# Patient Record
Sex: FEMALE | Race: WHITE | NOT HISPANIC OR LATINO | Employment: PART TIME | URBAN - METROPOLITAN AREA
[De-identification: names, ages, dates, MRNs, and addresses within clinical notes are randomized per-mention and may not be internally consistent; named-entity substitution may affect disease eponyms.]

---

## 2017-02-02 ENCOUNTER — OFFICE VISIT (OUTPATIENT)
Dept: FAMILY MEDICINE | Facility: CLINIC | Age: 52
End: 2017-02-02

## 2017-02-02 VITALS
HEART RATE: 67 BPM | TEMPERATURE: 98.4 F | WEIGHT: 161.2 LBS | DIASTOLIC BLOOD PRESSURE: 75 MMHG | BODY MASS INDEX: 27.45 KG/M2 | SYSTOLIC BLOOD PRESSURE: 112 MMHG

## 2017-02-02 DIAGNOSIS — Z00.00 PREVENTATIVE HEALTH CARE: ICD-10-CM

## 2017-02-02 DIAGNOSIS — G89.4 CHRONIC PAIN SYNDROME: Primary | ICD-10-CM

## 2017-02-02 LAB
AMPHETAMINES QUAL: NEGATIVE
BARBITURATES QUAL URINE: NEGATIVE
BENZODIAZEPINE QUAL URINE: NEGATIVE
BUPRENORPHINE QUAL URINE: NEGATIVE
CANNABINOIDS UR QL SCN: NEGATIVE
COCAINE QUAL URINE: NEGATIVE
METHAMPHETAMINE: NEGATIVE
METHODONE QUAL: NEGATIVE
MORPHINE QUAL: POSITIVE
OXYCODONE QUAL: POSITIVE
TEMPERATURE OF URINE WAS BETWEEN 90-100 DEGREES F: YES

## 2017-02-02 RX ORDER — HYDROCODONE BITARTRATE AND ACETAMINOPHEN 5; 325 MG/1; MG/1
TABLET ORAL
Qty: 30 TABLET | Refills: 0 | Status: SHIPPED | OUTPATIENT
Start: 2017-02-02 | End: 2017-09-18

## 2017-02-02 NOTE — PROGRESS NOTES
S: Kendra Chang is a 51 year old female who returns for follow up of   Pain meds  Patients states that main concern today is  Refills.  2. Left breat lump; had mammeñoan an US in 2015     PMHX/PSHX/MEDS/ALLERGIES/SHX/FHX reviewed and updated in Epic.      ROS:  General: No fevers, chills  Head: No headache  Ears: No acute change in hearing.    CV: No chest pain or palpitations.  Resp: No shortness of breath.  No cough. No hemoptysis.  GI: No nausea, vomiting, constipation, diarrhea  : No urinary pains    O: /75 mmHg  Pulse 67  Temp(Src) 98.4  F (36.9  C) (Oral)  Ht   Wt 161 lb 3.2 oz (73.12 kg)   Gen:  Well nourished and in NAD  Hip pain; using low dose vicodin/pain management  Extrem: no cyanosis, edema or clubbing  Psych: Euthymic    Breast exam; normal fibrocyteic no obvious masses palpated no adenopathy  (G89.4) Chronic pain syndrome  (primary encounter diagnosis)    Plan: Rapid Urine Drug Screen ([facility])        2.  Hx ofLeft  breast lump;  Repeat  mammogram   Comment: Hysterectomy  More than ten years ago, some menopause  symtoms    RTC in 4 weeks, for follow up of  Pain meds refills and mammogram results or sooner if develops new or worsening symptoms.    Elfego Tom

## 2017-02-02 NOTE — MR AVS SNAPSHOT
After Visit Summary   2/2/2017    Kendra Chang    MRN: [id number]           Patient Information     Date Of Birth          1965        Visit Information        Provider Department      2/2/2017 9:00 AM Elfego Tom MD [facility]        Today's Diagnoses     Chronic pain syndrome    -  1     Preventative health care           Care Instructions    Stop at the referral desk to schedule an appointment for a mammogram.      Follow up in 1 month.    Thank you for coming to [facility].  **If you had lab testing today and your results are reassuring or normal they will be be mailed to you within 7 days.   **If the lab tests need quick action we will call you with the results.  The phone number we will call with results is # [phone] (home) [phone] (work). If this is not the best number please call our clinic and change the number.  If you need any refills please call your pharmacy and they will contact us.  If you have any concerns about today's visit or wish to schedule another appointment please call our office during normal business hours [phone] (8-5:00 M-F)  If you have urgent medical concerns please call [phone] at any time of the day.  If you a medical emergency please call [phone]  Again thank you for choosing [facility] and please let us know how we can best partner with you to improve you and your family's health.          Follow-ups after your visit        Future tests that were ordered for you today     Open Future Orders        Priority Expected Expires Ordered    PCS Use: Screening Digital Bilateral Mammogram Routine  2/2/2018 2/2/2017            Who to contact     Please call your clinic at [phone] to:    Ask questions about your health    Make or cancel appointments    Discuss your medicines    Learn about your test results    Speak to your doctor   If you have compliments or concerns about an experience at your clinic, or if you wish to file a  complaint, please contact [facility] Patient Relations at [phone] or email us at [email or web address]         Additional Information About Your Visit        Reeliohart Information     Kites gives you secure access to your electronic health record. If you see a primary care provider, you can also send messages to your care team and make appointments. If you have questions, please call your primary care clinic.  If you do not have a primary care provider, please call [phone] and they will assist you.      Kites is an electronic gateway that provides easy, online access to your medical records. With Kites, you can request a clinic appointment, read your test results, renew a prescription or communicate with your care team.     To access your existing account, please contact your [facility] or call [phone] for assistance.        Care EveryWhere ID     This is your Care EveryWhere ID. This could be used by other organizations to access your [place] medical records  [id number]        Your Vitals Were     Pulse Temperature                67 98.4  F (36.9  C) (Oral)           Blood Pressure from Last 3 Encounters:   02/02/17 112/75   12/22/16 113/77   12/01/16 121/77    Weight from Last 3 Encounters:   02/02/17 161 lb 3.2 oz (73.12 kg)   12/22/16 167 lb 9.6 oz (76.023 kg)   12/01/16 171 lb (77.565 kg)              We Performed the Following     Rapid Urine Drug Screen ([facility])          Today's Medication Changes          These changes are accurate as of: 2/2/17  9:32 AM.  If you have any questions, ask your nurse or doctor.               These medicines have changed or have updated prescriptions.        Dose/Directions    * HYDROcodone-acetaminophen 5-325 MG per tablet   Commonly known as:  NORCO   This may have changed:  Another medication with the same name was added. Make sure you understand how and when to take each.   Used for:   Chronic pain syndrome   Changed by:  Elfego Tom MD        One to two per day as needed for  right hip pain   Quantity:  30 tablet   Refills:  0       * HYDROcodone-acetaminophen 5-325 MG per tablet   Commonly known as:  NORCO   This may have changed:  Another medication with the same name was added. Make sure you understand how and when to take each.   Used for:  Chronic pain syndrome   Changed by:  Elfego Tom MD        One to two as needed for pain maximum 2  tablet(s) per day   Quantity:  30 tablet   Refills:  0       * HYDROcodone-acetaminophen 5-325 MG per tablet   Commonly known as:  NORCO   This may have changed:  You were already taking a medication with the same name, and this prescription was added. Make sure you understand how and when to take each.   Used for:  Chronic pain syndrome   Changed by:  Elfego Tom MD        one to two as needed per hip pain maximum 2 tablet(s) per day   Quantity:  30 tablet   Refills:  0       * Notice:  This list has 3 medication(s) that are the same as other medications prescribed for you. Read the directions carefully, and ask your doctor or other care provider to review them with you.         Where to get your medicines      Some of these will need a paper prescription and others can be bought over the counter.  Ask your nurse if you have questions.     Bring a paper prescription for each of these medications    - HYDROcodone-acetaminophen 5-325 MG per tablet             Primary Care Provider Office Phone # Fax #    Elfego Tom [phone]       [address] [address]        Thank you!     Thank you for choosing [facility]  for your care. Our goal is always to provide you with excellent care. Hearing back from our patients is one way we can continue to improve our services. Please take a few minutes to complete the written survey that you may receive in the mail after your visit with us. Thank you!              Your Updated Medication List - Protect others around you: Learn how to safely use, store and throw away your medicines at www.disposemymeds.org.          This list is accurate as of: 2/2/17  9:32 AM.  Always use your most recent med list.                   Brand Name Dispense Instructions for use    * HYDROcodone-acetaminophen 5-325 MG per tablet    NORCO    30 tablet    One to two per day as needed for  right hip pain       * HYDROcodone-acetaminophen 5-325 MG per tablet    NORCO    30 tablet    One to two as needed for pain maximum 2  tablet(s) per day       * HYDROcodone-acetaminophen 5-325 MG per tablet    NORCO    30 tablet    one to two as needed per hip pain maximum 2 tablet(s) per day       levothyroxine 50 MCG tablet    SYNTHROID    90 tablet    Take 1 tablet (50 mcg) by mouth daily       lisinopril 20 MG tablet    PRINIVIL/ZESTRIL    90 tablet    Take 1 tablet (20 mg) by mouth daily       MULTI-VITAMIN PO      Take  by mouth.       OMEPRAZOLE PO          psyllium Wafr      Take 2 Wafers by mouth daily       vitamin D 400 UNITS capsule      Take 1 capsule by mouth daily.       * Notice:  This list has 3 medication(s) that are the same as other medications prescribed for you. Read the directions carefully, and ask your doctor or other care provider to review them with you.

## 2017-02-07 ENCOUNTER — HOSPITAL ENCOUNTER (OUTPATIENT)
Dept: MAMMOGRAPHY | Facility: CLINIC | Age: 52
Discharge: HOME | End: 2017-02-07
Attending: FAMILY MEDICINE

## 2017-02-07 DIAGNOSIS — Z00.00 PREVENTATIVE HEALTH CARE: ICD-10-CM

## 2017-02-07 DIAGNOSIS — Z12.31 VISIT FOR SCREENING MAMMOGRAM: ICD-10-CM

## 2017-02-07 LAB — MAMMOGRAM: NORMAL

## 2017-02-14 DIAGNOSIS — E03.8 OTHER SPECIFIED HYPOTHYROIDISM: ICD-10-CM

## 2017-02-14 RX ORDER — LEVOTHYROXINE SODIUM 50 UG/1
50 TABLET ORAL DAILY
Qty: 90 TABLET | Refills: 1 | Status: SHIPPED | OUTPATIENT
Start: 2017-02-14 | End: 2017-09-06

## 2017-03-06 DIAGNOSIS — I10 HTN (HYPERTENSION): ICD-10-CM

## 2017-03-06 DIAGNOSIS — I10 ESSENTIAL HYPERTENSION: Primary | ICD-10-CM

## 2017-03-07 RX ORDER — LISINOPRIL 20 MG/1
20 TABLET ORAL DAILY
Qty: 90 TABLET | Refills: 3 | Status: SHIPPED | OUTPATIENT
Start: 2017-03-07 | End: 2017-09-18

## 2017-07-18 ENCOUNTER — RECORDS - HEALTHEAST (OUTPATIENT)
Dept: ADMINISTRATIVE | Facility: OTHER | Age: 52
End: 2017-07-18

## 2017-07-18 ENCOUNTER — TRANSFERRED RECORDS (OUTPATIENT)
Dept: HEALTH INFORMATION MANAGEMENT | Facility: CLINIC | Age: 52
End: 2017-07-18

## 2017-08-10 ENCOUNTER — COMMUNICATION - HEALTHEAST (OUTPATIENT)
Dept: TELEHEALTH | Facility: CLINIC | Age: 52
End: 2017-08-10

## 2017-08-10 ENCOUNTER — HOSPITAL ENCOUNTER (OUTPATIENT)
Dept: MRI IMAGING | Facility: CLINIC | Age: 52
Discharge: HOME | End: 2017-08-10
Attending: PSYCHIATRY & NEUROLOGY

## 2017-08-10 ENCOUNTER — TRANSFERRED RECORDS (OUTPATIENT)
Dept: HEALTH INFORMATION MANAGEMENT | Facility: CLINIC | Age: 52
End: 2017-08-10

## 2017-08-10 DIAGNOSIS — M62.81 MUSCLE WEAKNESS (GENERALIZED): ICD-10-CM

## 2017-08-10 DIAGNOSIS — R29.898 RIGHT LEG WEAKNESS: ICD-10-CM

## 2017-09-06 DIAGNOSIS — E03.8 OTHER SPECIFIED HYPOTHYROIDISM: ICD-10-CM

## 2017-09-06 RX ORDER — LEVOTHYROXINE SODIUM 50 UG/1
50 TABLET ORAL DAILY
Qty: 90 TABLET | Refills: 1 | Status: SHIPPED | OUTPATIENT
Start: 2017-09-06 | End: 2017-09-18

## 2017-09-18 ENCOUNTER — OFFICE VISIT (OUTPATIENT)
Dept: ORTHOPEDICS | Facility: CLINIC | Age: 52
End: 2017-09-18

## 2017-09-18 VITALS — WEIGHT: 154 LBS | BODY MASS INDEX: 25.66 KG/M2 | HEIGHT: 65 IN

## 2017-09-18 DIAGNOSIS — Z96.649 S/P REVISION OF TOTAL HIP: ICD-10-CM

## 2017-09-18 DIAGNOSIS — Z96.649 S/P REVISION OF TOTAL HIP: Primary | ICD-10-CM

## 2017-09-18 LAB
CRP SERPL-MCNC: <2.9 MG/L (ref 0–8)
ERYTHROCYTE [SEDIMENTATION RATE] IN BLOOD BY WESTERGREN METHOD: 9 MM/H (ref 0–30)

## 2017-09-18 RX ORDER — HYDROCODONE BITARTRATE AND ACETAMINOPHEN 7.5; 325 MG/1; MG/1
TABLET ORAL
Refills: 0 | COMMUNITY
Start: 2017-05-08 | End: 2020-09-08

## 2017-09-18 NOTE — PROGRESS NOTES
"    [facility]  Orthopaedic Surgery, Joint Replacement Consultation  by Fran Panda M.D.    Kendra Chang MRN# [id number]   Age: 52 year old YOB: 1965     Requesting physician: London Du, [facility]  Kenneth Guzmánm, Neurologist, [facility]  Nilton Cooley, [facility].              Assessment and Plan:   Assessment:  1. RLE numbness and MR evidence of fluid about R hip in setting of known spinal stenosis and multilevel Degen disc disease.  Need to eval for possible ALTR.  2. S/p revision R JACK    Plan:  1. obtain MRI of hip and pelvis. Compare to MR from 2014.  2. Obtain EMG report.  3. XR of hip today  4. Metal ion levels.  5. Serum inflm markers.  If elevated, consider aspiration.  6. RTC for discussion once all data available.            History of Present Illness:   52 year old female  chief complaint    MRI done of spine and hip, due to RLE weakness and numbness, to below knee and medial calf / foot, after sitting for long periods of time.  Saw Neurologist Dr. Guzmán who did w/u incl EMG.  Using a cane now.      Current symptoms:  Problem: as above    Onset and duration: aoubt 6 months, since 3/2017  Awakens from sleep due to sx's:  Yes  Precipitating Injury:  No    Other joints or sites painful:  Yes, low back pain.      Background history:  Dx:   1. 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 2011-MRSA infection Right hip  3. Recurring subluxation R JACK  Tx:   1. 1998 -Right JACK- Dr. Franco, [facility], [facility] -primary with bone augment; had sx post-op   2. 4/11/2011 -Revision for pain w/ post-op femoral nerve palsy and continued \"sense of dislocating\" no gross dislocation- Dr. Durand at [facility]  3. 8/5/2011 -acute MRSA infection 4 months post-op treated with head liner exchange, removal of acetabular allograft, 6 weeks abx, continues on bactrim- Dr. Kahn at [facility],   4. 3/2013, ESR CRP (3/14/13) and right " "hip aspiration(3/19/13) results are within normal limits and negative for any active infection in her Hip  5. 05/21/13- revision JACK, only head and liner exchanged for subluxation related to impingement. intraop cultures (-). Dr. Panda, [facility]              Physical Exam:     EXAMINATION pertinent findings:   VITAL SIGNS: Height 1.651 m (5' 5\"), weight 69.9 kg (154 lb), not currently breastfeeding.  Body mass index is 25.63 kg/(m^2).  RESP: non labored breathing   ABD: benign   SKIN: grossly normal   LYMPHATIC: grossly normal   NEURO: grossly normal no motor deficit.  VASCULAR: satisfactory perfusion of all extremities   MUSCULOSKELETAL:   Antalgic gait.  Full ROM both hips, slight pain with R hip rotation.  SLR (-) to 80 deg R. To 90 deg on L.             Data:   All laboratory data reviewed  All imaging studies reviewed by me       MD Eileen Gonzalez Family Professor  Oncology and Adult Reconstructive Surgery  Dept Orthopaedic Surgery, [facility]  [phone] office, [phone] pager  [email or web address]            DATA for DOCUMENTATION:         Past Medical History:     Patient Active Problem List   Diagnosis     Other atopic dermatitis and related conditions     Contact dermatitis and other eczema, due to unspecified cause     Rosacea     Health Care Home     Hip pain     S/P hip replacement     H/O: hysterectomy     Hx of ectopic pregnancy     Arm fracture     S/P revision of total hip     Hypothyroidism     DJD (degenerative joint disease), lumbar     Chronic pain syndrome     Past Medical History:   Diagnosis Date     Arthritis     dysplagia right hip     Eczema     hands     Gastro-oesophageal reflux disease      GERD (gastroesophageal reflux disease)      History of blood transfusion      Thyroid disease        Also see scanned health assessment forms.       Past Surgical History:     Past Surgical History:   Procedure Laterality Date     APPENDECTOMY       ARTHROPLASTY REVISION HIP  " 5/21/2013    Procedure: ARTHROPLASTY REVISION HIP;  Right Total Hip Arthroplasty Revision   LATEX ALLERGY;  Surgeon: Fran Panda MD;  Location: [facility] OR     CHOLECYSTECTOMY       ENT SURGERY      tonsillectomy     GENITOURINARY SURGERY      3 tubal pregnancies     GYN SURGERY       HYSTERECTOMY       ORTHOPEDIC SURGERY      Right total hip ('98) & revision ('11) & hardware removal ('11)     ORTHOPEDIC SURGERY      Right elbow surgery x 3            Social History:     Social History     Social History     Marital status:      Spouse name: N/A     Number of children: N/A     Years of education: N/A     Occupational History     Not on file.     Social History Main Topics     Smoking status: Former Smoker     Quit date: 2/15/1985     Smokeless tobacco: Never Used     Alcohol use No     Drug use: No     Sexual activity: Not Currently     Birth control/ protection: Abstinence     Other Topics Concern     Not on file     Social History Narrative            Family History:       Family History   Problem Relation Age of Onset     Coronary Artery Disease Mother      CANCER Mother      DIABETES Father      Coronary Artery Disease Father      CANCER Father             Medications:     Current Outpatient Prescriptions   Medication Sig     HYDROcodone-acetaminophen (NORCO) 7.5-325 MG per tablet      IBUPROFEN PO      OMEPRAZOLE PO      Cholecalciferol (VITAMIN D) 400 UNITS capsule Take 1 capsule by mouth daily.     Multiple Vitamin (MULTI-VITAMIN PO) Take  by mouth.     No current facility-administered medications for this visit.               Review of Systems:   A comprehensive 10 point review of systems (constitutional, ENT, cardiac, peripheral vascular, lymphatic, respiratory, GI, , Musculoskeletal, skin, Neurological) was performed and found to be negative except as described in this note.     See intake form completed by patient

## 2017-09-18 NOTE — MR AVS SNAPSHOT
After Visit Summary   9/18/2017    Kendra Chang    MRN: [id number]           Patient Information     Date Of Birth          1965        Visit Information        Provider Department      9/18/2017 8:30 AM Fran Panda MD [facility]        Today's Diagnoses     S/P revision of total hip    -  1      Care Instructions    Xrays and labs today. Return to clinic 1-2 weeks to discuss results.          Follow-ups after your visit        Your next 10 appointments already scheduled     Oct 02, 2017  8:30 AM CDT   (Arrive by 8:15 AM)   Return Visit with Fran Panda MD   [facility] ([facility])    [address]  [address] [facility] [address] [phone]              Who to contact     Please call your clinic at [phone] to:    Ask questions about your health    Make or cancel appointments    Discuss your medicines    Learn about your test results    Speak to your doctor   If you have compliments or concerns about an experience at your clinic, or if you wish to file a complaint, please contact [facility] Patient Relations at [phone] or email us at [email or web address]         Additional Information About Your Visit        MyChart Information     [employer] gives you secure access to your electronic health record. If you see a primary care provider, you can also send messages to your care team and make appointments. If you have questions, please call your primary care clinic.  If you do not have a primary care provider, please call [phone] and they will assist you.      boarding pass is an electronic gateway that provides easy, online access to your medical records. With boarding pass, you can request a clinic appointment, read your test results, renew a prescription or communicate with your care team.     To access your existing account, please contact your [facility]  "[facility] Clinic or call [phone] for assistance.        Care EveryWhere ID     This is your Care EveryWhere ID. This could be used by other organizations to access your [place] medical records  [id number]        Your Vitals Were     Height BMI (Body Mass Index)                1.651 m (5' 5\") 25.63 kg/m2           Blood Pressure from Last 3 Encounters:   02/02/17 112/75   12/22/16 113/77   12/01/16 121/77    Weight from Last 3 Encounters:   09/18/17 69.9 kg (154 lb)   02/02/17 73.1 kg (161 lb 3.2 oz)   12/22/16 76 kg (167 lb 9.6 oz)               Primary Care Provider Office Phone # Fax #    Elfego Tom [phone]       [address] [place] [id number]        Equal Access to Services     [facility] Development[facility] CenterBIANKA : Hadii irena Ramos, waaxnicola paz, qaybta kaalmanicola ulloa, viky smith . So [facility] [phone].    ATENCIÓN: Si habla español, tiene a camilo disposición servicios gratuitos de asistencia lingüística. Mica al [phone].    We comply with applicable federal civil rights laws and [place] laws. We do not discriminate on the basis of race, color, national origin, age, disability sex, sexual orientation or gender identity.            Thank you!     Thank you for choosing [facility]  for your care. Our goal is always to provide you with excellent care. Hearing back from our patients is one way we can continue to improve our services. Please take a few minutes to complete the written survey that you may receive in the mail after your visit with us. Thank you!             Your Updated Medication List - Protect others around you: Learn how to safely use, store and throw away your medicines at www.disposemymeds.org.          This list is accurate as of: 9/18/17 10:23 AM.  Always use your most recent med list.                   Brand Name Dispense Instructions for use Diagnosis    HYDROcodone-acetaminophen 7.5-325 MG per tablet    NORCO      " S/P revision of total hip       IBUPROFEN PO       S/P revision of total hip       MULTI-VITAMIN PO      Take  by mouth.        OMEPRAZOLE PO           vitamin D 400 UNITS capsule      Take 1 capsule by mouth daily.

## 2017-09-18 NOTE — LETTER
"9/18/2017       RE: Kendra Chang  [address]  [place] [address]     Dear Colleague,    Thank you for referring your patient, Kendra Chang, to the [facility]THOP[facility] at [facility]. Please see a copy of my visit note below.        [facility]  Orthopaedic Surgery, Joint Replacement Consultation  by Fran Panda M.D.    Kendra Chang MRN# [id number]   Age: 52 year old YOB: 1965     Requesting physician: London Du, [facility]  Clemente Guzmán, Neurologist, [facility].  Nilton Cooley [facility].              Assessment and Plan:   Assessment:  1. RLE numbness and MR evidence of fluid about R hip in setting of known spinal stenosis and multilevel Degen disc disease.  Need to eval for possible ALTR.  2. S/p revision R JACK    Plan:  1. obtain MRI of hip and pelvis. Compare to MR from 2014.  2. Obtain EMG report.  3. XR of hip today  4. Metal ion levels.  5. Serum inflm markers.  If elevated, consider aspiration.  6. RTC for discussion once all data available.            History of Present Illness:   52 year old female  chief complaint    MRI done of spine and hip, due to RLE weakness and numbness, to below knee and medial calf / foot, after sitting for long periods of time.  Saw Neurologist Dr. Guzmán who did w/u incl EMG.  Using a cane now.      Current symptoms:  Problem: as above    Onset and duration: aoubt 6 months, since 3/2017  Awakens from sleep due to sx's:  Yes  Precipitating Injury:  No    Other joints or sites painful:  Yes, low back pain.      Background history:  Dx:   1. 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 2011-MRSA infection Right hip  3. Recurring subluxation R JACK  Tx:   1. 1998 -Right JACK- Dr. Franco, [facility], [facility] -primary with bone augment; had sx post-op   2. 4/11/2011 -Revision for pain w/ post-op femoral nerve palsy and continued \"sense of dislocating\" no gross " "dislocation- Dr. Durand at [facility]  3. 8/5/2011 -acute MRSA infection 4 months post-op treated with head liner exchange, removal of acetabular allograft, 6 weeks abx, continues on bactrim- Dr. Kahn at [facility],   4. 3/2013, ESR CRP (3/14/13) and right hip aspiration(3/19/13) results are within normal limits and negative for any active infection in her Hip  5. 05/21/13- revision JACK, only head and liner exchanged for subluxation related to impingement. intraop cultures (-). Dr. Panda, [facility]              Physical Exam:     EXAMINATION pertinent findings:   VITAL SIGNS: Height 1.651 m (5' 5\"), weight 69.9 kg (154 lb), not currently breastfeeding.  Body mass index is 25.63 kg/(m^2).  RESP: non labored breathing   ABD: benign   SKIN: grossly normal   LYMPHATIC: grossly normal   NEURO: grossly normal no motor deficit.  VASCULAR: satisfactory perfusion of all extremities   MUSCULOSKELETAL:   Antalgic gait.  Full ROM both hips, slight pain with R hip rotation.  SLR (-) to 80 deg R. To 90 deg on L.             Data:   All laboratory data reviewed  All imaging studies reviewed by me       Fran Panda MD  [facility] Family Professor  Oncology and Adult Reconstructive Surgery  Dept Orthopaedic Surgery, [facility]  [phone] office, [phone] pager  [email or web address]            DATA for DOCUMENTATION:         Past Medical History:     Patient Active Problem List   Diagnosis     Other atopic dermatitis and related conditions     Contact dermatitis and other eczema, due to unspecified cause     Rosacea     Health Care Home     Hip pain     S/P hip replacement     H/O: hysterectomy     Hx of ectopic pregnancy     Arm fracture     S/P revision of total hip     Hypothyroidism     DJD (degenerative joint disease), lumbar     Chronic pain syndrome     Past Medical History:   Diagnosis Date     Arthritis     dysplagia right hip     Eczema     hands     Gastro-oesophageal reflux " disease      GERD (gastroesophageal reflux disease)      History of blood transfusion      Thyroid disease        Also see scanned health assessment forms.       Past Surgical History:     Past Surgical History:   Procedure Laterality Date     APPENDECTOMY       ARTHROPLASTY REVISION HIP  5/21/2013    Procedure: ARTHROPLASTY REVISION HIP;  Right Total Hip Arthroplasty Revision   LATEX ALLERGY;  Surgeon: Fran Panda MD;  [facility]: [facility]     CHOLECYSTECTOMY       ENT SURGERY      tonsillectomy     GENITOURINARY SURGERY      3 tubal pregnancies     GYN SURGERY       HYSTERECTOMY       ORTHOPEDIC SURGERY      Right total hip ('98) & revision ('11) & hardware removal ('11)     ORTHOPEDIC SURGERY      Right elbow surgery x 3            Social History:     Social History     Social History     Marital status:      Spouse name: N/A     Number of children: N/A     Years of education: N/A     Occupational History     Not on file.     Social History Main Topics     Smoking status: Former Smoker     Quit date: 2/15/1985     Smokeless tobacco: Never Used     Alcohol use No     Drug use: No     Sexual activity: Not Currently     Birth control/ protection: Abstinence     Other Topics Concern     Not on file     Social History Narrative            Family History:       Family History   Problem Relation Age of Onset     Coronary Artery Disease Mother      CANCER Mother      DIABETES Father      Coronary Artery Disease Father      CANCER Father             Medications:     Current Outpatient Prescriptions   Medication Sig     HYDROcodone-acetaminophen (NORCO) 7.5-325 MG per tablet      IBUPROFEN PO      OMEPRAZOLE PO      Cholecalciferol (VITAMIN D) 400 UNITS capsule Take 1 capsule by mouth daily.     Multiple Vitamin (MULTI-VITAMIN PO) Take  by mouth.     No current facility-administered medications for this visit.               Review of Systems:   A comprehensive 10 point review of systems (constitutional, ENT,  cardiac, peripheral vascular, lymphatic, respiratory, GI, , Musculoskeletal, skin, Neurological) was performed and found to be negative except as described in this note.     See intake form completed by patient        Again, thank you for allowing me to participate in the care of your patient.      Sincerely,    Fran Panda MD

## 2017-09-18 NOTE — LETTER
"9/18/2017      RE: Kendra Chang  [address]  [place] [address]           [facility]  Orthopaedic Surgery, Joint Replacement Consultation  by Fran Panda M.D.    Kendra Chang MRN# [id number]   Age: 52 year old YOB: 1965     Requesting physician: London Du, [facility]eth[facility], Neurologist, [facility].  Nilton Cooley, [facility].              Assessment and Plan:   Assessment:  1. RLE numbness and MR evidence of fluid about R hip in setting of known spinal stenosis and multilevel Degen disc disease.  Need to eval for possible ALTR.  2. S/p revision R JACK    Plan:  1. obtain MRI of hip and pelvis. Compare to MR from 2014.  2. Obtain EMG report.  3. XR of hip today  4. Metal ion levels.  5. Serum inflm markers.  If elevated, consider aspiration.  6. RTC for discussion once all data available.            History of Present Illness:   52 year old female  chief complaint    MRI done of spine and hip, due to RLE weakness and numbness, to below knee and medial calf / foot, after sitting for long periods of time.  Saw Neurologist Dr. Guzmán who did w/u incl EMG.  Using a cane now.      Current symptoms:  Problem: as above    Onset and duration: aoubt 6 months, since 3/2017  Awakens from sleep due to sx's:  Yes  Precipitating Injury:  No    Other joints or sites painful:  Yes, low back pain.      Background history:  Dx:   1. 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 2011-MRSA infection Right hip  3. Recurring subluxation R JACK  Tx:   1. 1998 -Right JACK- Dr. Franco, [facility], [facility] -primary with bone augment; had sx post-op   2. 4/11/2011 -Revision for pain w/ post-op femoral nerve palsy and continued \"sense of dislocating\" no gross dislocation- Dr. Durand at [facility]  3. 8/5/2011 -acute MRSA infection 4 months post-op treated with head liner exchange, removal of acetabular allograft, 6 weeks abx, continues on " "bactrim- Dr. Kahn at [facility],   4. 3/2013, ESR CRP (3/14/13) and right hip aspiration(3/19/13) results are within normal limits and negative for any active infection in her Hip  5. 05/21/13- revision JACK, only head and liner exchanged for subluxation related to impingement. intraop cultures (-). Dr. Panda, [facility]              Physical Exam:     EXAMINATION pertinent findings:   VITAL SIGNS: Height 1.651 m (5' 5\"), weight 69.9 kg (154 lb), not currently breastfeeding.  Body mass index is 25.63 kg/(m^2).  RESP: non labored breathing   ABD: benign   SKIN: grossly normal   LYMPHATIC: grossly normal   NEURO: grossly normal no motor deficit.  VASCULAR: satisfactory perfusion of all extremities   MUSCULOSKELETAL:   Antalgic gait.  Full ROM both hips, slight pain with R hip rotation.  SLR (-) to 80 deg R. To 90 deg on L.             Data:   All laboratory data reviewed  All imaging studies reviewed by me       MD Eileen Gonzalez Family Professor  Oncology and Adult Reconstructive Surgery  Dept Orthopaedic Surgery, [facility]  [phone] office, [phone] pager  [email or web address]            DATA for DOCUMENTATION:         Past Medical History:     Patient Active Problem List   Diagnosis     Other atopic dermatitis and related conditions     Contact dermatitis and other eczema, due to unspecified cause     Rosacea     Health Care Home     Hip pain     S/P hip replacement     H/O: hysterectomy     Hx of ectopic pregnancy     Arm fracture     S/P revision of total hip     Hypothyroidism     DJD (degenerative joint disease), lumbar     Chronic pain syndrome     Past Medical History:   Diagnosis Date     Arthritis     dysplagia right hip     Eczema     hands     Gastro-oesophageal reflux disease      GERD (gastroesophageal reflux disease)      History of blood transfusion      Thyroid disease        Also see scanned health assessment forms.       Past Surgical History:     Past Surgical " History:   Procedure Laterality Date     APPENDECTOMY       ARTHROPLASTY REVISION HIP  5/21/2013    Procedure: ARTHROPLASTY REVISION HIP;  Right Total Hip Arthroplasty Revision   LATEX ALLERGY;  Surgeon: Fran Panda MD;  [facility]     CHOLECYSTECTOMY       ENT SURGERY      tonsillectomy     GENITOURINARY SURGERY      3 tubal pregnancies     GYN SURGERY       HYSTERECTOMY       ORTHOPEDIC SURGERY      Right total hip ('98) & revision ('11) & hardware removal ('11)     ORTHOPEDIC SURGERY      Right elbow surgery x 3            Social History:     Social History     Social History     Marital status:      Spouse name: N/A     Number of children: N/A     Years of education: N/A     Occupational History     Not on file.     Social History Main Topics     Smoking status: Former Smoker     Quit date: 2/15/1985     Smokeless tobacco: Never Used     Alcohol use No     Drug use: No     Sexual activity: Not Currently     Birth control/ protection: Abstinence     Other Topics Concern     Not on file     Social History Narrative            Family History:       Family History   Problem Relation Age of Onset     Coronary Artery Disease Mother      CANCER Mother      DIABETES Father      Coronary Artery Disease Father      CANCER Father             Medications:     Current Outpatient Prescriptions   Medication Sig     HYDROcodone-acetaminophen (NORCO) 7.5-325 MG per tablet      IBUPROFEN PO      OMEPRAZOLE PO      Cholecalciferol (VITAMIN D) 400 UNITS capsule Take 1 capsule by mouth daily.     Multiple Vitamin (MULTI-VITAMIN PO) Take  by mouth.     No current facility-administered medications for this visit.               Review of Systems:   A comprehensive 10 point review of systems (constitutional, ENT, cardiac, peripheral vascular, lymphatic, respiratory, GI, , Musculoskeletal, skin, Neurological) was performed and found to be negative except as described in this note.     See intake form completed by  patient        Fran Panda MD

## 2017-09-18 NOTE — NURSING NOTE
"Reason For Visit:   Chief Complaint   Patient presents with     RECHECK     Pt. states that she is here today to discuss about her MRI, base on her neurologist there were some abnormal finding on her MRI.  HX: 05/21/13- revision Right JACK.        Pain Assessment  Patient Currently in Pain: Yes  0-10 Pain Scale: 5  Primary Pain Location: Hip  Pain Orientation: Right  Pain Descriptors: Discomfort, Aching, Sharp, Shooting  Alleviating Factors: Rest, NSAIDS, Pain medication  Aggravating Factors: Movement, Walking, Standing               HEIGHT: 5' 5\", WEIGHT: 154 lbs 0 oz, BMI: Body mass index is 25.63 kg/(m^2).      Dx:   1. 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 2011-MRSA infection Right hip  3. Recurring subluxation R JACK  Tx:   1. 1998 -Right JACK- Dr. Franco, [facility], [facility] -primary with bone augment; had sx post-op   2. 4/11/2011 -Revision for pain w/ post-op femoral nerve palsy and continued \"sense of dislocating\" no gross dislocation- Dr. Durand at [facility]  3. 8/5/2011 -acute MRSA infection 4 months post-op treated with head liner exchange, removal of acetabular allograft, 6 weeks abx, continues on bactrim- Dr. Kahn at [facility],   4. 3/2013, ESR CRP (3/14/13) and right hip aspiration(3/19/13) results are within normal limits and negative for any active infection in her Hip  5. 05/21/13- revision JACK, only head and liner exchanged for subluxation related to impingement. intraop cultures (-). Dr. Panda, [facility]          "

## 2017-09-21 LAB
CR SERPL-MCNC: <1 UG/L
IL6 SERPL-MCNC: 0.74 PG/ML

## 2017-09-22 LAB — COBALT SERPL-MCNC: <1 UG/L

## 2017-10-02 ENCOUNTER — OFFICE VISIT (OUTPATIENT)
Dept: ORTHOPEDICS | Facility: CLINIC | Age: 52
End: 2017-10-02

## 2017-10-02 VITALS — BODY MASS INDEX: 25.71 KG/M2 | WEIGHT: 154.3 LBS | HEIGHT: 65 IN

## 2017-10-02 DIAGNOSIS — T84.84XD PAIN DUE TO HIP JOINT PROSTHESIS, SUBSEQUENT ENCOUNTER: Primary | ICD-10-CM

## 2017-10-02 DIAGNOSIS — Z96.649 PAIN DUE TO HIP JOINT PROSTHESIS, SUBSEQUENT ENCOUNTER: Primary | ICD-10-CM

## 2017-10-02 RX ORDER — BETAMETHASONE SODIUM PHOSPHATE AND BETAMETHASONE ACETATE 3; 3 MG/ML; MG/ML
12 INJECTION, SUSPENSION INTRA-ARTICULAR; INTRALESIONAL; INTRAMUSCULAR; SOFT TISSUE ONCE
Qty: 2 ML | Refills: 0 | OUTPATIENT
Start: 2017-10-02 | End: 2019-11-26

## 2017-10-02 RX ORDER — LIDOCAINE HYDROCHLORIDE 10 MG/ML
5 INJECTION, SOLUTION INFILTRATION; PERINEURAL ONCE
Qty: 5 ML | Refills: 0 | OUTPATIENT
Start: 2017-10-02 | End: 2017-10-02

## 2017-10-02 ASSESSMENT — HOOS S4: HOW SEVERE IS YOUR HIP JOINT STIFFNESS AFTER FIRST WAKENING IN THE MORNING?: SEVERE

## 2017-10-02 ASSESSMENT — ACTIVITIES OF DAILY LIVING (ADL)
ADL_SUM: 41
ADL_MEAN: 2.41
ADL_COUNT: 17
ADL_SUBSCALE_SCORE: 39.75

## 2017-10-02 NOTE — NURSING NOTE
"Chief Complaint   Patient presents with     RECHECK     F/u Painful Right JACK, DOS: 5/21/13       52 year old  1965    Ht 1.651 m (5' 5\")  Wt 70 kg (154 lb 4.8 oz)  BMI 25.68 kg/m2                       [id number] - [place], [place] - [address]    Allergies   Allergen Reactions     Novocain [Procaine Hcl] Shortness Of Breath     Compazine      Feels like jaw is going out of socket     Latex Hives and Itching     Metoclopramide      \"dystonic reaction\"      Morphine Hcl Hives     Penicillins      Phenothiazines      \"dystonic reaction\"      Procaine      Pt denies allergy. Had tachycardia in dental office after local injecion, likely due to epinephrine.     Reglan Other (See Comments)     Joint issue with jaw, feels painful     Current Outpatient Prescriptions   Medication     HYDROcodone-acetaminophen (NORCO) 7.5-325 MG per tablet     IBUPROFEN PO     OMEPRAZOLE PO     Cholecalciferol (VITAMIN D) 400 UNITS capsule     Multiple Vitamin (MULTI-VITAMIN PO)     No current facility-administered medications for this visit.          Questionnaires:  HOOS Hip Dysfunction & Osteoarthritis Outcome Questionnaire    Hip Dysfunction & Osteoarthritis Outcome Score (HOOS), English Version LK 2.0 (Ward Perez, Edwin FRANKLIN, 2003) 10/2/2017   S1. Do you feel grinding, hear clicking or any other type of noise from your hip? 3   S2. Difficulties spreading legs wide apart 3   S3. Difficulties to stride out when walking 4   S4. How severe is your hip joint stiffness after first wakening in the morning? 3   S5. How severe is your hip stiffness after sitting, lying or resting later in the day? 4   P1. How often is your hip painful? 4   P2. Straightening your hip fully 3   P3. Bending your hip fully 3   P4. Walking on a flat surface 3   P5. Going up or down stairs 4   P6. At night while in bed 4   P7. Sitting or lying 4   P8. Standing upright 3   P9. Walking on a hard surface (asphalt, concrete, etc.) 4 "   P10. Walking on an uneven surface 4   A1. Descending stairs 3   A2. Ascending stairs 3   A3. Rising from sitting 4   A4. Standing 3   A5. Bending to the floor/ an object 3   A6. Walking on a flat surface 3   A7. Getting in/out of car 3   A8. Going shopping 3   A9. Putting on socks/stockings 0   A10. Rising from bed 3   A11. Taking off socks/stockings 0   A12. Lying in bed (turning over, maintaining hip position) 3   A13. Getting in/out of bath 0   A14. Sitting 4   A15. Getting on/off toilet 3   A16. Heavy domestic duties (moving heavy boxes, scrubbing floors, etc) 0   A17. Light domestic duties (cooking, dusting, etc) 3   SP1. Squatting 0   SP2. Running 0   SP3. Twisting/pivoting on loaded leg 0   SP4. Walking on uneven surface 3   Q1. How often are you aware of your hip problem? 4   Q2. Have you modified your life style to avoid activities potentially damaging to your hip? 4   Q3. How much are you troubled with lack of confidence in your hip? 4   Q4. In general, how much difficulty do you have with your hip? 4   Symptom Subscale Score 15   Pain Subscale Score 10   ADL Subscale Score 39.75   Sports and Recreation Subscale Score 81.25   Quality of Life Score 0          Promis 10 Assessment    PROMIS 10 1/19/2016   In general, would you say your health is: 2   In general, would you say your quality of life is: 2   In general, how would you rate your physical health? 2   In general, how would you rate your mental health, including your mood and your ability to think? 3   In general, how would you rate your satisfaction with your social activities and relationships? 3   In general, please rate how well you carry out your usual social activities and roles. (This includes activities at home, at work and in your community, and responsibilities as a parent, child, spouse, employee, friend, etc.) 3   To what extent are you able to carry out your everyday physical activities such as walking, climbing stairs, carrying  groceries, or moving a chair? 2   In the past 7 days, how often have you been bothered by emotional problems such as feeling anxious, depressed, or irritable? 2   In the past 7 days, how would you rate your fatigue on average? 2   In the past 7 days, how would you rate your pain on average, where 0 means no pain, and 10 means worst imaginable pain? 5   Global Mental Health Score 12   Global Physical Health Score 11   PROMIS TOTAL - SUBSCORES 23   Pain question re-calculation - no clinical value 3   Some recent data might be hidden              Monserrat Barnett C.M.A.

## 2017-10-02 NOTE — LETTER
"10/2/2017       RE: Dylan Shipman  [address] [place] [address]     Dear Colleague,    Thank you for referring your patient, Dylan Shipman, to the [facility]. Please see a copy of my visit note below.      [facility]  Orthopaedic Surgery, Joint Replacement Consultation  by Fran Panda M.D.    Dylan Shipman MRN# [id number]   Age: 52 year old YOB: 1965     Requesting physician: London Du, [facility]  GuzmánKenneth garym, Neurologist, [facility].  Ed Yasir, [facility].     Dx:   1. 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 2011-MRSA infection Right hip  3. Recurring subluxation R JACK  Tx:   1. 1998 -Right JACK- Dr. Franco, [facility], [facility] -primary with bone augment; had sx post-op   2. 4/11/2011 -Revision for pain w/ post-op femoral nerve palsy and continued \"sense of dislocating\" no gross dislocation- Dr. Durand at [facility]  3. 8/5/2011 -acute MRSA infection 4 months post-op treated with head liner exchange, removal of acetabular allograft, 6 weeks abx, continues on bactrim- Dr. Kahn at [facility],   4. 3/2013, ESR CRP (3/14/13) and right hip aspiration(3/19/13) results are within normal limits and negative for any active infection in her Hip  5. 05/21/13- revision JACK, only head and liner exchanged for subluxation related to impingement. intraop cultures (-). Dr. Panda, [facility]         Results for DYLAN SHIPMAN (MRN [id number]) as of 10/2/2017 09:37   Ref. Range 9/18/2017 09:59   Chromium Latest Ref Range: <=5.0 ug/L <1.0   Cobalt Latest Ref Range: <=1.0 ug/L <1.0   CRP Inflammation Latest Ref Range: 0.0 - 8.0 mg/L <2.9   Interleukin 6 Blood Latest Ref Range: <3.01 pg/mL 0.74   Sed Rate Latest Ref Range: 0 - 30 mm/h 9     Mrs. Lyles returns today for review of the laboratory testing and the MRI examination review. I compared " this to the prior study in 2014. Also laboratory testing as noted above. There is no systemic evidence of metallosis. The MRI examination does develop show some fluid collection posteriorly however. This is new since since 2014.    PROCEDURE NOTE:  Procedure aspiration of the right hip was performed the patient consents and 1% lidocaine local anesthesia. No fluid was obtained. I therefore injected 4 cc of 1% Xylocaine with 3 cc of Celestone Soluspan steroid into the right hip joint. After 15 minutes there is no perceptible change in her symptoms. Longer term results pending.           Assessment and Plan:   Assessment:  1. RLE numbness and MR evidence of fluid about R hip in setting of known spinal stenosis and multilevel Degen disc disease.  possible ALTR.  Progressive pain over last 12 mo. With development of a limp.   2. S/p revision R JACK    Plan:  1. Steroid injection today with aspiration. Await longer term outcome.  2. Obtain EMG report.  3. If sx's persist, may need to consider fluoroscopic guided aspiration.  4. RTC in 3-6 mo.       Fran Panda MD  [facility] Family Professor  Oncology and Adult Reconstructive Surgery  Dept Orthopaedic Surgery, [facility]  [phone] office, [phone] pager  [email or web address]      Total Time = 25 min, 50% of which was spent in counseling and coordination of care as documented above.

## 2017-10-02 NOTE — PROGRESS NOTES
"    [facility]  Orthopaedic Surgery, Joint Replacement Consultation  by Fran Panda M.D.    Dylan Shipman MRN# [id number]   Age: 52 year old YOB: 1965     Requesting physician: London Du, [facility], Neurologist, [facility].  Nilton Cooley, [facility].     Dx:   1. 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 2011-MRSA infection Right hip  3. Recurring subluxation R JACK  Tx:   1. 1998 -Right JACK- Dr. Franco, [facility], [facility] -primary with bone augment; had sx post-op   2. 4/11/2011 -Revision for pain w/ post-op femoral nerve palsy and continued \"sense of dislocating\" no gross dislocation- Dr. Durand at [facility]  3. 8/5/2011 -acute MRSA infection 4 months post-op treated with head liner exchange, removal of acetabular allograft, 6 weeks abx, continues on bactrim- Dr. Kahn at [facility],   4. 3/2013, ESR CRP (3/14/13) and right hip aspiration(3/19/13) results are within normal limits and negative for any active infection in her Hip  5. 05/21/13- revision JACK, only head and liner exchanged for subluxation related to impingement. intraop cultures (-). Dr. Panda, [facility]         Results for DYLAN SHIPMAN (MRN [id number]) as of 10/2/2017 09:37   Ref. Range 9/18/2017 09:59   Chromium Latest Ref Range: <=5.0 ug/L <1.0   Cobalt Latest Ref Range: <=1.0 ug/L <1.0   CRP Inflammation Latest Ref Range: 0.0 - 8.0 mg/L <2.9   Interleukin 6 Blood Latest Ref Range: <3.01 pg/mL 0.74   Sed Rate Latest Ref Range: 0 - 30 mm/h 9     Mrs. Lyles returns today for review of the laboratory testing and the MRI examination review. I compared this to the prior study in 2014. Also laboratory testing as noted above. There is no systemic evidence of metallosis. The MRI examination does develop show some fluid collection posteriorly however. This is new since since 2014.    PROCEDURE NOTE:  Procedure aspiration of the right hip was " performed the patient consents and 1% lidocaine local anesthesia. No fluid was obtained. I therefore injected 4 cc of 1% Xylocaine with 3 cc of Celestone Soluspan steroid into the right hip joint. After 15 minutes there is no perceptible change in her symptoms. Longer term results pending.           Assessment and Plan:   Assessment:  1. RLE numbness and MR evidence of fluid about R hip in setting of known spinal stenosis and multilevel Degen disc disease.  possible ALTR.  Progressive pain over last 12 mo. With development of a limp.   2. S/p revision R JACK    Plan:  1. Steroid injection today with aspiration. Await longer term outcome.  2. Obtain EMG report.  3. If sx's persist, may need to consider fluoroscopic guided aspiration.  4. RTC in 3-6 mo.       Fran Panda MD  [facility] Family Professor  Oncology and Adult Reconstructive Surgery  Dept Orthopaedic Surgery, [facility]  [phone] office, [phone] pager  [email or web address]      Total Time = 25 min, 50% of which was spent in counseling and coordination of care as documented above.

## 2017-10-02 NOTE — MR AVS SNAPSHOT
After Visit Summary   10/2/2017    Kendra Chang    MRN: [id number]           Patient Information     Date Of Birth          1965        Visit Information        Provider Department      10/2/2017 8:30 AM Fran Panda MD [facility]        Today's Diagnoses     Pain due to hip joint prosthesis, subsequent encounter    -  1       Follow-ups after your visit        Future tests that were ordered for you today     Open Future Orders        Priority Expected Expires Ordered    Synovasure Periprosthetic Joint Infection Detection Routine  10/3/2018 10/2/2017            Who to contact     Please call your clinic at [phone] to:    Ask questions about your health    Make or cancel appointments    Discuss your medicines    Learn about your test results    Speak to your doctor   If you have compliments or concerns about an experience at your clinic, or if you wish to file a complaint, please contact [facility] Patient Relations at [phone] or email us at [email or web address]         Additional Information About Your Visit        MyChart Information     [employer] gives you secure access to your electronic health record. If you see a primary care provider, you can also send messages to your care team and make appointments. If you have questions, please call your primary care clinic.  If you do not have a primary care provider, please call [phone] and they will assist you.      [employer] is an electronic gateway that provides easy, online access to your medical records. With [employer], you can request a clinic appointment, read your test results, renew a prescription or communicate with your care team.     To access your existing account, please contact your [facility] or call [phone] for assistance.        Care EveryWhere ID     This is your Care EveryWhere ID. This could be used by other organizations to  "access your [place] medical records  [id number]        Your Vitals Were     Height BMI (Body Mass Index)                1.651 m (5' 5\") 25.68 kg/m2           Blood Pressure from Last 3 Encounters:   02/02/17 112/75   12/22/16 113/77   12/01/16 121/77    Weight from Last 3 Encounters:   10/02/17 70 kg (154 lb 4.8 oz)   09/18/17 69.9 kg (154 lb)   02/02/17 73.1 kg (161 lb 3.2 oz)              We Performed the Following     Large Joint/Bursa injection and/or drainage - Unilateral (Shoulder, Knee) [20610]          Today's Medication Changes          These changes are accurate as of: 10/2/17 11:59 PM.  If you have any questions, ask your nurse or doctor.               Start taking these medicines.        Dose/Directions    betamethasone acet & sod phos 6 (3-3) MG/ML Susp injection   Commonly known as:  CELESTONE   Used for:  Pain due to hip joint prosthesis, subsequent encounter   Started by:  Fran Panda MD        Dose:  12 mg   2 mLs (12 mg) by INTRA-ARTICULAR route once for 1 dose   Quantity:  2 mL   Refills:  0       lidocaine 1 % injection   Used for:  Pain due to hip joint prosthesis, subsequent encounter   Started by:  Fran Panda MD        Dose:  5 mL   5 mLs by INTRA-ARTICULAR route once for 1 dose   Quantity:  5 mL   Refills:  0            Where to get your medicines      Some of these will need a paper prescription and others can be bought over the counter.  Ask your nurse if you have questions.     You don't need a prescription for these medications     betamethasone acet & sod phos 6 (3-3) MG/ML Susp injection    lidocaine 1 % injection                Primary Care Provider Office Phone # Fax #    Elfego Tom [phone] [address] [facility] [address]        Equal Access to Services     [facility] JOSE M AH: Penny Ramos, wajinny paz, qaclintonta kaalviky lópez. So [facility] [phone].    ATENCIÓN: Si carey choi, " tiene a camilo disposición servicios gratuitos de asistencia lingüística. Mica ortzi [phone].    We comply with applicable federal civil rights laws and [place] laws. We do not discriminate on the basis of race, color, national origin, age, disability, sex, sexual orientation, or gender identity.            Thank you!     Thank you for choosing [facility]  for your care. Our goal is always to provide you with excellent care. Hearing back from our patients is one way we can continue to improve our services. Please take a few minutes to complete the written survey that you may receive in the mail after your visit with us. Thank you!             Your Updated Medication List - Protect others around you: Learn how to safely use, store and throw away your medicines at www.disposemymeds.org.          This list is accurate as of: 10/2/17 11:59 PM.  Always use your most recent med list.                   Brand Name Dispense Instructions for use Diagnosis    betamethasone acet & sod phos 6 (3-3) MG/ML Susp injection    CELESTONE    2 mL    2 mLs (12 mg) by INTRA-ARTICULAR route once for 1 dose    Pain due to hip joint prosthesis, subsequent encounter       HYDROcodone-acetaminophen 7.5-325 MG per tablet    NORCO      S/P revision of total hip       IBUPROFEN PO       S/P revision of total hip       lidocaine 1 % injection     5 mL    5 mLs by INTRA-ARTICULAR route once for 1 dose    Pain due to hip joint prosthesis, subsequent encounter       MULTI-VITAMIN PO      Take  by mouth.        OMEPRAZOLE PO           vitamin D 400 UNITS capsule      Take 1 capsule by mouth daily.

## 2017-10-02 NOTE — NURSING NOTE
[facility]  [address] [place] [address]  Dept: [phone]  ______________________________________________________________________________    Patient: Kendra Chang   : 1965   MRN: [id number]   2017    INVASIVE PROCEDURE SAFETY CHECKLIST    Date: 10/2/2017   Procedure: Right Hip Cortisone Steroid Injection   Patient Name: Kendra Chang  MRN: [id number]  YOB: 1965    Action: Complete sections as appropriate. Any discrepancy results in a HARD COPY until resolved.     PRE PROCEDURE:  Patient ID verified with 2 identifiers (name and  or MRN): Yes  Procedure and site verified with patient/designee (when able): Yes  Accurate consent documentation in medical record: Yes  H&P (or appropriate assessment) documented in medical record: Yes  H&P must be up to 20 days prior to procedure and updates within 24 hours of procedure as applicable: Yes  Relevant diagnostic and radiology test results appropriately labeled and displayed as applicable: Yes  Procedure site(s) marked with provider initials: Yes    TIMEOUT:  Time-Out performed immediately prior to starting procedure, including verbal and active participation of all team members addressing the following:Yes  * Correct patient identify  * Confirmed that the correct side and site are marked  * An accurate procedure consent form  * Agreement on the procedure to be done  * Correct patient position  * Relevant images and results are properly labeled and appropriately displayed  * The need to administer antibiotics or fluids for irrigation purposes during the procedure as applicable   * Safety precautions based on patient history or medication use    DURING PROCEDURE: Verification of correct person, site, and procedures any time the responsibility for care of the patient is transferred to another member of the care team.       The following medications were given:     MEDICATION:   Lidocaine 1% Soln  ROUTE: Intraarticular   SITE: Right Hip   DOSE: 4cc  LOT #: [id number]  : Hosp[employer]  EXPIRATION DATE: 04/2019  NDC#: 2920-2431-43   Was there drug waste? Yes  Amount of drug waste (mL): 4.  Reason for waste:  As per MD    MEDICATION:  Betamethasone   ROUTE: Intraarticular   SITE: Right Hip   DOSE: 3cc  LOT #: [id number]  : [place]  EXPIRATION DATE: 08/2018  NDC#: 2440-9727-07   Was there drug waste? Amount of drug waste (mL): 2.  Reason for waste:  As per MD Monserrat Barnett CMA  October 2, 2017

## 2017-10-03 ENCOUNTER — TELEPHONE (OUTPATIENT)
Dept: ORTHOPEDICS | Facility: CLINIC | Age: 52
End: 2017-10-03

## 2017-10-03 NOTE — TELEPHONE ENCOUNTER
Kendra states she was given a steroid injection in her hip yesterday.  Last night she had a low grade temp of 100, pain around the site and a headache.  She took one Tylenol 250 mg tablet last night, and one tablet at 8 a.m.  I advised she take 2 tylenol tablets and keep track of how many mg she is taking, not to exceed 4,000 mg in 24 hours.  Also, to use a hot water bottle to injection site.  She will call back this afternoon if symptoms haven't improved.

## 2018-02-05 ENCOUNTER — RECORDS - HEALTHEAST (OUTPATIENT)
Dept: ADMINISTRATIVE | Facility: OTHER | Age: 53
End: 2018-02-05

## 2018-02-16 ENCOUNTER — HOSPITAL ENCOUNTER (OUTPATIENT)
Dept: ULTRASOUND IMAGING | Facility: CLINIC | Age: 53
Discharge: HOME | End: 2018-02-16

## 2018-02-16 ENCOUNTER — COMMUNICATION - HEALTHEAST (OUTPATIENT)
Dept: TELEHEALTH | Facility: CLINIC | Age: 53
End: 2018-02-16

## 2018-02-16 ENCOUNTER — HOSPITAL ENCOUNTER (OUTPATIENT)
Dept: MAMMOGRAPHY | Facility: CLINIC | Age: 53
Discharge: HOME | End: 2018-02-16

## 2018-02-16 DIAGNOSIS — N63.0 BREAST LUMP: ICD-10-CM

## 2018-03-06 ENCOUNTER — TELEPHONE (OUTPATIENT)
Dept: FAMILY MEDICINE | Facility: CLINIC | Age: 53
End: 2018-03-06

## 2018-03-06 DIAGNOSIS — E03.9 HYPOTHYROIDISM, UNSPECIFIED TYPE: Primary | ICD-10-CM

## 2018-03-06 NOTE — TELEPHONE ENCOUNTER
Request refill sent for Levothyroxine 50 MCG, not in patient's current medication list.  Please advise.

## 2018-03-07 RX ORDER — LEVOTHYROXINE SODIUM 50 UG/1
50 TABLET ORAL DAILY
Qty: 90 TABLET | Refills: 2 | Status: SHIPPED | OUTPATIENT
Start: 2018-03-07 | End: 2018-12-17

## 2018-04-05 ENCOUNTER — TELEPHONE (OUTPATIENT)
Dept: FAMILY MEDICINE | Facility: CLINIC | Age: 53
End: 2018-04-05

## 2018-04-05 DIAGNOSIS — I10 ESSENTIAL HYPERTENSION: Primary | ICD-10-CM

## 2018-04-05 NOTE — TELEPHONE ENCOUNTER
Request refill for Lisinopril 20 mg not in patient's chart.  Please advise. ([facility])

## 2018-04-11 RX ORDER — LISINOPRIL 20 MG/1
20 TABLET ORAL DAILY
Qty: 90 TABLET | Refills: 1 | Status: SHIPPED | OUTPATIENT
Start: 2018-04-11 | End: 2019-06-24

## 2018-05-08 ENCOUNTER — TRANSFERRED RECORDS (OUTPATIENT)
Dept: HEALTH INFORMATION MANAGEMENT | Facility: CLINIC | Age: 53
End: 2018-05-08

## 2018-06-06 ENCOUNTER — TRANSFERRED RECORDS (OUTPATIENT)
Dept: HEALTH INFORMATION MANAGEMENT | Facility: CLINIC | Age: 53
End: 2018-06-06

## 2018-06-06 ASSESSMENT — MIFFLIN-ST. JEOR: SCORE: 1266.28

## 2018-06-07 ENCOUNTER — ANESTHESIA - HEALTHEAST (OUTPATIENT)
Dept: SURGERY | Facility: CLINIC | Age: 53
End: 2018-06-07

## 2018-06-07 ENCOUNTER — SURGERY - HEALTHEAST (OUTPATIENT)
Dept: SURGERY | Facility: CLINIC | Age: 53
End: 2018-06-07

## 2018-06-07 ASSESSMENT — MIFFLIN-ST. JEOR: SCORE: 1250.85

## 2018-06-12 ENCOUNTER — TRANSFERRED RECORDS (OUTPATIENT)
Dept: HEALTH INFORMATION MANAGEMENT | Facility: CLINIC | Age: 53
End: 2018-06-12

## 2018-09-04 ENCOUNTER — MEDICAL CORRESPONDENCE (OUTPATIENT)
Dept: HEALTH INFORMATION MANAGEMENT | Facility: CLINIC | Age: 53
End: 2018-09-04

## 2018-09-04 ENCOUNTER — TRANSFERRED RECORDS (OUTPATIENT)
Dept: HEALTH INFORMATION MANAGEMENT | Facility: CLINIC | Age: 53
End: 2018-09-04

## 2018-09-11 ENCOUNTER — OFFICE VISIT (OUTPATIENT)
Dept: FAMILY MEDICINE | Facility: CLINIC | Age: 53
End: 2018-09-11
Payer: COMMERCIAL

## 2018-09-11 VITALS
RESPIRATION RATE: 18 BRPM | SYSTOLIC BLOOD PRESSURE: 114 MMHG | WEIGHT: 150.4 LBS | HEART RATE: 63 BPM | BODY MASS INDEX: 25.03 KG/M2 | OXYGEN SATURATION: 99 % | TEMPERATURE: 98.8 F | DIASTOLIC BLOOD PRESSURE: 74 MMHG

## 2018-09-11 DIAGNOSIS — E03.8 OTHER SPECIFIED HYPOTHYROIDISM: ICD-10-CM

## 2018-09-11 DIAGNOSIS — K29.50 OTHER CHRONIC GASTRITIS WITHOUT HEMORRHAGE: Primary | ICD-10-CM

## 2018-09-11 LAB
BASOPHILS # BLD AUTO: 0.1 THOU/UL (ref 0–0.2)
BASOPHILS NFR BLD AUTO: 1 % (ref 0–2)
CRP SERPL-MCNC: 0.2 MG/DL (ref 0–0.8)
EOSINOPHIL # BLD AUTO: 0.2 THOU/UL (ref 0–0.4)
EOSINOPHIL NFR BLD AUTO: 2 % (ref 0–6)
ERYTHROCYTE [DISTWIDTH] IN BLOOD BY AUTOMATED COUNT: 11.8 % (ref 11–14.5)
ERYTHROCYTE [SEDIMENTATION RATE] IN BLOOD: 13 MM/HR (ref 0–20)
HCT VFR BLD AUTO: 38.7 % (ref 35–47)
HGB BLD-MCNC: 12.7 G/DL (ref 12–16)
LYMPHOCYTES # BLD AUTO: 1.2 THOU/UL (ref 0.8–4.4)
LYMPHOCYTES NFR BLD AUTO: 18 % (ref 20–40)
MCH RBC QN AUTO: 31.1 PG (ref 27–34)
MCHC RBC AUTO-ENTMCNC: 32.8 G/DL (ref 32–36)
MCV RBC AUTO: 95 FL (ref 80–100)
MONOCYTES # BLD AUTO: 0.5 THOU/UL (ref 0–0.9)
MONOCYTES NFR BLD AUTO: 7 % (ref 2–10)
NEUTROPHILS # BLD AUTO: 4.6 THOU/UL (ref 2–7.7)
NEUTROPHILS NFR BLD AUTO: 72 % (ref 50–70)
PLATELET # BLD AUTO: 239 THOU/UL (ref 140–440)
PMV BLD AUTO: 9.5 FL (ref 8.5–12.5)
RBC # BLD AUTO: 4.08 MILL/UL (ref 3.8–5.4)
TSH SERPL DL<=0.05 MIU/L-ACNC: 0.92 UIU/ML (ref 0.3–5)
WBC # BLD AUTO: 6.5 THOU/UL (ref 4–11)

## 2018-09-11 NOTE — LETTER
September 12, 2018      Kendra Chang  [address] [place] [address]        Dear Knedra,  Your TSH is good/so we continuee same amount of syntrhoid   Please see below for your test results.    Resulted Orders   Thyroid [place] ([facility])   Result Value Ref Range    TSH 0.92 0.30 - 5.00 uIU/mL    Narrative    Test performed by:  [facility] [address] [place] [address]   CBC w/ Diff and Plt ([facility])   Result Value Ref Range    WBC 6.5 4.0 - 11.0 thou/uL    RBC 4.08 3.80 - 5.40 mill/uL    Hemoglobin 12.7 12.0 - 16.0 g/dL    Hematocrit 38.7 35.0 - 47.0 %    MCV 95 80 - 100 fL    MCH 31.1 27.0 - 34.0 pg    MCHC 32.8 32.0 - 36.0 g/dL    RDW 11.8 11.0 - 14.5 %    Platelets 239 140 - 440 thou/uL    Mean Platelet Volume 9.5 8.5 - 12.5 fL    % Neutrophils 72 (H) 50 - 70 %    % Lymphocytes 18 (L) 20 - 40 %    % Monocytes 7 2 - 10 %    % Eosinophils 2 0 - 6 %    % Basophils 1 0 - 2 %    Neutrophils (Absolute) 4.6 2.0 - 7.7 thou/uL    Lymphs (Absolute) 1.2 0.8 - 4.4 thou/uL    Monocytes(Absolute) 0.5 0.0 - 0.9 thou/uL    Eos (Absolute) 0.2 0.0 - 0.4 thou/uL    Baso (Absolute) 0.1 0.0 - 0.2 thou/uL    Narrative    Test performed by:  [facility] [address] [place] [address]   Erythrocyte Sed Rate ([facility])   Result Value Ref Range    Sed Rate 13 0 - 20 mm/hr    Narrative    Test performed by:  [facility] [address] [place] [address]   C-Reactive Protein ([facility])   Result Value Ref Range    C-Reactive Protein 0.2 0.0 - 0.8 mg/dL    Narrative    Test performed by:  [facility] [address] [place] [address]       If you have any questions, please call the clinic to make an appointment.    Sincerely,    Elfego Tom MD

## 2018-09-11 NOTE — PROGRESS NOTES
S: Kendra Chang is a 53 year old female who returns for follow up of  stomack pain, lately has more mid  epigastric pain  Taken omeprazole 20 mg daily for a long time   Patients states that main concern today is  stomach pain.    PMHX/PSHX/MEDS/ALLERGIES/SHX/FHX reviewed and updated in Epic.      ROS:  General: No fevers, chills  Head: No headache  Ears: No acute change in hearing.    CV: No chest pain or palpitations.  Resp: No shortness of breath.  No cough. No hemoptysis.  GI: No nausea, vomiting, constipation, diarrhea  : No urinary pains    O: /74  Pulse 63  Temp 98.8  F (37.1  C) (Oral)  Resp 18  Wt 150 lb 6.4 oz (68.2 kg)  SpO2 99%  BMI 25.03 kg/m2   Gen:  Well nourished and in NAD    CV:  RRR  - no murmurs, rubs, or gallups,   Pulm:  CTAB, no wheezes/rales/rhonchi, good air entry   ABD: soft, nontender, no masses, no rebound, BS intact throughout  Extrem: no cyanosis, edema or clubbing  Psych: Euthymic      1 Gastritis/GERD   Plan: Since she had a upper  Normal endoscopy  3 years ago increase omeprazole to 40 mg for 4 to 8 weeks, then decrease it to 20 mg per day  Will need to stop aspirin /self started   Stool H,. pylori antigen ordered today  GERD treatment discussed   if no better might need endoscopy 4 to 8 weeks  She verbalized understanding of the options discussed and was satisfied with the final plan.  2 Recent HIP MRSA; labs per ortho done today  3 Hypothyroidism: TSH today  RTC in 6  weeks, for follow up of gastritis sooner if develops new or worsening symptoms.    Elfego Tom

## 2018-09-11 NOTE — MR AVS SNAPSHOT
After Visit Summary   9/11/2018    Kendra Chang    MRN: [id number]           Patient Information     Date Of Birth          1965        Visit Information        Provider Department      9/11/2018 9:20 AM Elfego Tom MD [facility]        Today's Diagnoses     Other chronic gastritis without hemorrhage    -  1    Other specified hypothyroidism          Care Instructions    Omeprazole 40 mg for 4 to 8 weeks   then back to 20 mg   H pilory  antigen   if no better scope          Follow-ups after your visit        Your next 10 appointments already scheduled     Oct 24, 2018  9:20 AM CDT   Return Visit with Elfego Tom MD   [facility] ([facility])    [address] [place] [address]   [phone]              Future tests that were ordered for you today     Open Future Orders        Priority Expected Expires Ordered    H. Pylori Agn Fecal ([employer]) Routine  9/18/2018 9/11/2018            Who to contact     Please call your clinic at [phone] to:    Ask questions about your health    Make or cancel appointments    Discuss your medicines    Learn about your test results    Speak to your doctor            Additional Information About Your Visit        [employer] Information     [employer] gives you secure access to your electronic health record. If you see a primary care provider, you can also send messages to your care team and make appointments. If you have questions, please call your primary care clinic.  If you do not have a primary care provider, please call [phone] and they will assist you.      [employer] is an electronic gateway that provides easy, online access to your medical records. With [employer], you can request a clinic appointment, read your test results, renew a prescription or communicate with your care team.     To access your existing account, please contact your [facility] or call [phone] for assistance.        Care  EveryWhere ID     This is your Care EveryWhere ID. This could be used by other organizations to access your [place] medical records  [id number]        Your Vitals Were     Pulse Temperature Respirations Pulse Oximetry BMI (Body Mass Index)       63 98.8  F (37.1  C) (Oral) 18 99% 25.03 kg/m2        Blood Pressure from Last 3 Encounters:   09/11/18 114/74   02/02/17 112/75   12/22/16 113/77    Weight from Last 3 Encounters:   09/11/18 150 lb 6.4 oz (68.2 kg)   10/02/17 154 lb 4.8 oz (70 kg)   09/18/17 154 lb (69.9 kg)              We Performed the Following     C-Reactive Protein ([employer])     CBC w/ Diff and Plt ([employer])     Erythrocyte Sed Rate ([employer])     Thyroid [place] ([facility])       Information about OPIOIDS     PRESCRIPTION OPIOIDS: WHAT YOU NEED TO KNOW   We gave you an opioid (narcotic) pain medicine. It is important to manage your pain, but opioids are not always the best choice. You should first try all the other options your care team gave you. Take this medicine for as short a time (and as few doses) as possible.    Some activities can increase your pain, such as bandage changes or therapy sessions. It may help to take your pain medicine 30 to 60 minutes before these activities. Reduce your stress by getting enough sleep, working on hobbies you enjoy and practicing relaxation or meditation. Talk to your care team about ways to manage your pain beyond prescription opioids.    These medicines have risks:    DO NOT drive when on new or higher doses of pain medicine. These medicines can affect your alertness and reaction times, and you could be arrested for driving under the influence (DUI). If you need to use opioids long-term, talk to your care team about driving.    DO NOT operate heavy machinery    DO NOT do any other dangerous activities while taking these medicines.    DO NOT drink any alcohol while taking these medicines.     If the opioid prescribed includes acetaminophen, DO NOT  take with any other medicines that contain acetaminophen. Read all labels carefully. Look for the word  acetaminophen  or  Tylenol.  Ask your pharmacist if you have questions or are unsure.    You can get addicted to pain medicines, especially if you have a history of addiction (chemical, alcohol or substance dependence). Talk to your care team about ways to reduce this risk.    All opioids tend to cause constipation. Drink plenty of water and eat foods that have a lot of fiber, such as fruits, vegetables, prune juice, apple juice and high-fiber cereal. Take a laxative (Miralax, milk of magnesia, Colace, Senna) if you don t move your bowels at least every other day. Other side effects include upset stomach, sleepiness, dizziness, throwing up, tolerance (needing more of the medicine to have the same effect), physical dependence and slowed breathing.    Store your pills in a secure place, locked if possible. We will not replace any lost or stolen medicine. If you don t finish your medicine, please throw away (dispose) as directed by your pharmacist. The [place] Pollution Control Agency has more information about safe disposal: [email or web address]-medications         Primary Care Provider Office Phone # Fax #    Elfego Tom [phone]       [address] [place] [address]        Equal Access to Services     KATI SALGUERO : Penny delarosao Sodanelleali, waaxda luqadaha, qaybta kaalmada adeegyada, viky jones. So [facility] [phone].    ATENCIÓN: Si habla español, tiene a camlio disposición servicios gratuitos de asistencia lingüística. Mica al [phone].    We comply with applicable federal civil rights laws and [place] laws. We do not discriminate on the basis of race, color, national origin, age, disability, sex, sexual orientation, or gender identity.            Thank you!     Thank you for choosing [facility]  for your  care. Our goal is always to provide you with excellent care. Hearing back from our patients is one way we can continue to improve our services. Please take a few minutes to complete the written survey that you may receive in the mail after your visit with us. Thank you!             Your Updated Medication List - Protect others around you: Learn how to safely use, store and throw away your medicines at www.disposemymeds.org.          This list is accurate as of 9/11/18 12:23 PM.  Always use your most recent med list.                   Brand Name Dispense Instructions for use Diagnosis    ASPIRIN PO      Take 81 mg by mouth        betamethasone acet & sod phos 6 (3-3) MG/ML Susp injection    CELESTONE    2 mL    2 mLs (12 mg) by INTRA-ARTICULAR route once for 1 dose    Pain due to hip joint prosthesis, subsequent encounter       CELECOXIB PO      Take 200 mg by mouth        GABAPENTIN PO      Take 300 mg by mouth        HYDROcodone-acetaminophen 7.5-325 MG per tablet    NORCO      S/P revision of total hip       IBUPROFEN PO       S/P revision of total hip       levothyroxine 50 MCG tablet    SYNTHROID/LEVOTHROID    90 tablet    Take 1 tablet (50 mcg) by mouth daily    Hypothyroidism, unspecified type       lisinopril 20 MG tablet    PRINIVIL/ZESTRIL    90 tablet    Take 1 tablet (20 mg) by mouth daily    Essential hypertension       MULTI-VITAMIN PO      Take  by mouth.        OMEPRAZOLE PO           OXYCODONE HCL PO           vitamin D 400 units capsule      Take 1 capsule by mouth daily.

## 2018-10-13 ENCOUNTER — TRANSFERRED RECORDS (OUTPATIENT)
Dept: HEALTH INFORMATION MANAGEMENT | Facility: CLINIC | Age: 53
End: 2018-10-13

## 2018-10-14 ENCOUNTER — HOME CARE/HOSPICE - HEALTHEAST (OUTPATIENT)
Dept: HOME HEALTH SERVICES | Facility: HOME HEALTH | Age: 53
End: 2018-10-14

## 2018-10-16 ENCOUNTER — COMMUNICATION - HEALTHEAST (OUTPATIENT)
Dept: PHYSICAL THERAPY | Age: 53
End: 2018-10-16

## 2018-10-16 ENCOUNTER — HOME CARE/HOSPICE - HEALTHEAST (OUTPATIENT)
Dept: HOME HEALTH SERVICES | Facility: HOME HEALTH | Age: 53
End: 2018-10-16

## 2018-10-17 ENCOUNTER — TELEPHONE (OUTPATIENT)
Dept: FAMILY MEDICINE | Facility: CLINIC | Age: 53
End: 2018-10-17

## 2018-10-17 ENCOUNTER — HOME CARE/HOSPICE - HEALTHEAST (OUTPATIENT)
Dept: HOME HEALTH SERVICES | Facility: HOME HEALTH | Age: 53
End: 2018-10-17

## 2018-10-17 NOTE — TELEPHONE ENCOUNTER
Called [facility] and gave v. For PT for 2 x a week for 3 weeks.  [facility] stated that patient is declining O.T. As she feels she doesn't need that piece.    Routed to Dr. Tom/ZHANNA Frost RN

## 2018-10-17 NOTE — TELEPHONE ENCOUNTER
[facility] phone call message- general phone call:    Reason for call: Calling for verbal orders for PT for 2 x a wk for 3 wks.  Pt is also declining OT.    Return call needed: Yes    OK to leave a message on voice mail? Yes    Primary language: English      needed? No    Call taken on October 17, 2018 at 4:05 PM by Karlie Manuel

## 2018-10-18 ENCOUNTER — HOME CARE/HOSPICE - HEALTHEAST (OUTPATIENT)
Dept: HOME HEALTH SERVICES | Facility: HOME HEALTH | Age: 53
End: 2018-10-18

## 2018-10-22 ENCOUNTER — HOME CARE/HOSPICE - HEALTHEAST (OUTPATIENT)
Dept: HOME HEALTH SERVICES | Facility: HOME HEALTH | Age: 53
End: 2018-10-22

## 2018-10-23 ENCOUNTER — TRANSFERRED RECORDS (OUTPATIENT)
Dept: HEALTH INFORMATION MANAGEMENT | Facility: CLINIC | Age: 53
End: 2018-10-23

## 2018-10-26 ENCOUNTER — HOME CARE/HOSPICE - HEALTHEAST (OUTPATIENT)
Dept: HOME HEALTH SERVICES | Facility: HOME HEALTH | Age: 53
End: 2018-10-26

## 2018-10-30 ENCOUNTER — HOME CARE/HOSPICE - HEALTHEAST (OUTPATIENT)
Dept: HOME HEALTH SERVICES | Facility: HOME HEALTH | Age: 53
End: 2018-10-30

## 2018-11-01 ENCOUNTER — HOME CARE/HOSPICE - HEALTHEAST (OUTPATIENT)
Dept: HOME HEALTH SERVICES | Facility: HOME HEALTH | Age: 53
End: 2018-11-01

## 2018-11-06 ENCOUNTER — TRANSFERRED RECORDS (OUTPATIENT)
Dept: HEALTH INFORMATION MANAGEMENT | Facility: CLINIC | Age: 53
End: 2018-11-06

## 2018-11-20 ENCOUNTER — TRANSFERRED RECORDS (OUTPATIENT)
Dept: HEALTH INFORMATION MANAGEMENT | Facility: CLINIC | Age: 53
End: 2018-11-20

## 2018-12-17 DIAGNOSIS — E03.9 HYPOTHYROIDISM, UNSPECIFIED TYPE: ICD-10-CM

## 2018-12-17 RX ORDER — LEVOTHYROXINE SODIUM 50 UG/1
50 TABLET ORAL DAILY
Qty: 90 TABLET | Refills: 2 | Status: SHIPPED | OUTPATIENT
Start: 2018-12-17 | End: 2019-10-15

## 2019-02-12 ENCOUNTER — OFFICE VISIT (OUTPATIENT)
Dept: FAMILY MEDICINE | Facility: CLINIC | Age: 54
End: 2019-02-12
Payer: COMMERCIAL

## 2019-02-12 VITALS
DIASTOLIC BLOOD PRESSURE: 66 MMHG | BODY MASS INDEX: 24.43 KG/M2 | TEMPERATURE: 98.5 F | SYSTOLIC BLOOD PRESSURE: 95 MMHG | HEART RATE: 66 BPM | OXYGEN SATURATION: 97 % | WEIGHT: 152 LBS | HEIGHT: 66 IN | RESPIRATION RATE: 20 BRPM

## 2019-02-12 DIAGNOSIS — Z86.0100 HISTORY OF COLONIC POLYPS: Primary | ICD-10-CM

## 2019-02-12 ASSESSMENT — MIFFLIN-ST. JEOR: SCORE: 1301.22

## 2019-02-12 ASSESSMENT — ANXIETY QUESTIONNAIRES
7. FEELING AFRAID AS IF SOMETHING AWFUL MIGHT HAPPEN: NOT AT ALL
GAD7 TOTAL SCORE: 0
2. NOT BEING ABLE TO STOP OR CONTROL WORRYING: NOT AT ALL
3. WORRYING TOO MUCH ABOUT DIFFERENT THINGS: NOT AT ALL
1. FEELING NERVOUS, ANXIOUS, OR ON EDGE: NOT AT ALL
5. BEING SO RESTLESS THAT IT IS HARD TO SIT STILL: NOT AT ALL
IF YOU CHECKED OFF ANY PROBLEMS ON THIS QUESTIONNAIRE, HOW DIFFICULT HAVE THESE PROBLEMS MADE IT FOR YOU TO DO YOUR WORK, TAKE CARE OF THINGS AT HOME, OR GET ALONG WITH OTHER PEOPLE: NOT DIFFICULT AT ALL
6. BECOMING EASILY ANNOYED OR IRRITABLE: NOT AT ALL

## 2019-02-12 ASSESSMENT — PATIENT HEALTH QUESTIONNAIRE - PHQ9
5. POOR APPETITE OR OVEREATING: NOT AT ALL
SUM OF ALL RESPONSES TO PHQ QUESTIONS 1-9: 0

## 2019-02-12 NOTE — PROGRESS NOTES
"S: Kendra Chang is a 53 year old female who returns for follow up of  Saw blood in stool after  having crampy stomack pain  Now is resolved. stomach pain is better At risk for gastritis/bleed because of NSAIDs taken for hip/back pain  2013   Had Colonoscopy/hyperpalstivc polyp : normal colonoscopy, diverticulosis, hemorrhoids   Normal upper endoscopy   Results scanned    Patients states that main concern today  One episode of blood in stools    PMHX/PSHX/MEDS/ALLERGIES/SHX/FHX reviewed and updated in Epic.      ROS:  General: No fevers, chills  Head: No headache  Ears: No acute change in hearing.    CV: No chest pain or palpitations.  Resp: No shortness of breath.  No cough. No hemoptysis.  GI: No nausea, vomiting, constipation, diarrhea  : No urinary pains    O: BP 95/66 (BP Location: Left arm, Patient Position: Sitting, Cuff Size: Adult Small)   Pulse 66   Temp 98.5  F (36.9  C) (Oral)   Resp 20   Ht 1.676 m (5' 6\")   Wt 68.9 kg (152 lb)   SpO2 97%   BMI 24.53 kg/m     Gen:  Well nourished and in NAD    Neck: supple without lymphadenopathy  CV:  RRR  - no murmurs, rubs, or gallups,   Pulm:  CTAB, no wheezes/rales/rhonchi, good air entry   ABD: soft, nontender, no masses, no rebound, BS intact throughout  Extrem: no cyanosis, edema or clubbing  Psych: Euthymic      Blood ins tool/ probably due to upper GI   Stop NSAID'S since in [facility] oxycodone at painclinic   Continue omeprazole, consider H2 blockers in few months  Colonoscopy follow-up routine in [address]   If continue to have blood ins tool will consider endococopy    RTC routine [facility] or sooner if develops new or worsening symptoms.    Elfego Tom      "

## 2019-02-12 NOTE — PATIENT INSTRUCTIONS
You have stomach  pain and  One time dark  bloody stools    Now you have normal stools  I think the bloody stools  were from inflamation of your stomach   you had colonoscopy done 2013 and had hyperplastic poly/next colonoscope is 2023   ok continue you omeprazole, consider changing to H2 in few months  Avoid  NSAIDs      Follow-up 2 to 4 weeks if continue blood in stools and stoamck pain, will consider endoscopy          Continue your CMP/oxycodone 7.5 mg for hip and back

## 2019-02-13 ASSESSMENT — ANXIETY QUESTIONNAIRES: GAD7 TOTAL SCORE: 0

## 2019-06-21 DIAGNOSIS — I10 ESSENTIAL HYPERTENSION: ICD-10-CM

## 2019-06-24 ENCOUNTER — TELEPHONE (OUTPATIENT)
Dept: FAMILY MEDICINE | Facility: CLINIC | Age: 54
End: 2019-06-24

## 2019-06-24 RX ORDER — LISINOPRIL 20 MG/1
20 TABLET ORAL DAILY
Qty: 90 TABLET | Refills: 1 | Status: SHIPPED | OUTPATIENT
Start: 2019-06-24 | End: 2020-06-29

## 2019-06-24 NOTE — TELEPHONE ENCOUNTER
[facility] phone call message- general phone call:    Reason for call:     Pt was told by the pharmacy that they do not have her medication for high blood pressure.     Action desired:     Pt calling to get it resent to the pharmacy.     Return call needed: Yes    OK to leave a message on voice mail? Yes    Advised patient to response may take up to 2 business days: Yes    Primary language: English      needed? No    Call taken on June 24, 2019 at 9:51 AM by Cee Lennon

## 2019-08-07 ENCOUNTER — TRANSFERRED RECORDS (OUTPATIENT)
Dept: HEALTH INFORMATION MANAGEMENT | Facility: CLINIC | Age: 54
End: 2019-08-07

## 2019-08-20 ENCOUNTER — OFFICE VISIT (OUTPATIENT)
Dept: FAMILY MEDICINE | Facility: CLINIC | Age: 54
End: 2019-08-20
Payer: COMMERCIAL

## 2019-08-20 VITALS
SYSTOLIC BLOOD PRESSURE: 127 MMHG | HEART RATE: 62 BPM | TEMPERATURE: 98.5 F | DIASTOLIC BLOOD PRESSURE: 79 MMHG | RESPIRATION RATE: 16 BRPM | OXYGEN SATURATION: 97 % | WEIGHT: 152.8 LBS | BODY MASS INDEX: 24.66 KG/M2

## 2019-08-20 DIAGNOSIS — E03.8 OTHER SPECIFIED HYPOTHYROIDISM: ICD-10-CM

## 2019-08-20 DIAGNOSIS — K21.00 REFLUX ESOPHAGITIS: Primary | ICD-10-CM

## 2019-08-20 DIAGNOSIS — Z96.641 STATUS POST RIGHT HIP REPLACEMENT: ICD-10-CM

## 2019-08-20 LAB — TSH SERPL DL<=0.05 MIU/L-ACNC: 1.68 UIU/ML (ref 0.3–5)

## 2019-08-20 RX ORDER — OMEPRAZOLE 40 MG/1
40 CAPSULE, DELAYED RELEASE ORAL DAILY
Qty: 30 CAPSULE | Refills: 1 | Status: SHIPPED | OUTPATIENT
Start: 2019-08-20 | End: 2019-09-25

## 2019-08-20 NOTE — PATIENT INSTRUCTIONS
Increased Prilosec to 40 mg for  4 weeks, need to taper    TSH/ to hypothyroidism treatment  Follow-up 3 to 4 weeks

## 2019-08-20 NOTE — LETTER
August 21, 2019      Kendra Chang  [address] [place] [address] [place] [address]        Dear Kendra,  Thyroid- TSH is normal,  Continue same dose of synthroid    Next check  in one year   Please see below for your test results.    Resulted Orders   TSH  Sensitive ([facility])   Result Value Ref Range    TSH 1.68 0.30 - 5.00 uIU/mL    Narrative    Test performed by:  [facility]  [address] [place] [address]       If you have any questions, please call the clinic to make an appointment.    Sincerely,    Elfego Tom MD

## 2019-08-20 NOTE — PROGRESS NOTES
S: Kendra Chang is a 54 year old female who returns for follow up of  Gastritis/kown and at risk because of NSAIDx for chronic hip problems  She takes PPIs but has began to have more stomach  Discomfort  2 Chronic hip pain; Follow  with ortho and ID, randolph [facility]utchison   She goes to pain pain and also takes  use    3 Hypothyroidism; due for labs, no complains  Patients states that main concern today is stomach pain.    PMHX/PSHX/MEDS/ALLERGIES/SHX/FHX reviewed and updated in Epic.      ROS:  General: No fevers, chills  Head: No headache  Ears: No acute change in hearing.    CV: No chest pain or palpitations.  Resp: No shortness of breath.  No cough. No hemoptysis.  GI: No nausea, vomiting, constipation, diarrhea  : No urinary pains    O: /79   Pulse 62   Temp 98.5  F (36.9  C) (Oral)   Resp 16   Wt 69.3 kg (152 lb 12.8 oz)   SpO2 97%   BMI 24.66 kg/m     Gen:  Well nourished and in NAD  HEENT: PERRLA; TMs normal color and landmarks; nasopharynx pink and moist; oropharynx pink and moist  Neck: supple without lymphadenopathy  CV:  RRR  - no murmurs, rubs, or gallups,   Pulm:  CTAB, no wheezes/rales/rhonchi, good air entry   ABD: soft, nontender, no masses, no rebound, BS intact throughout  Extrem: no cyanosis, edema or clubbing  Psych: Euthymic    A/P  1 Reflux/gastritis   Minizide NSAIDs.  Kendra  goes to pain clinic for chronic hip pain   Will Double current PIPIs -40 MG Prilosecfor 4 weeks and re-eval   will need taper of PPis  2 Chronic hip pain; follow-up with ortho and ID follow-up with pain clinic   minimize NSAIDs   3 Hypothyroidism: asymptomatic/probaly adequately replaced   Due for TSH  .    RTC in 4 weeks, for follow up of stomack pain  or sooner if develops new or worsening symptoms.    Elfego Tom

## 2019-09-25 ENCOUNTER — OFFICE VISIT (OUTPATIENT)
Dept: FAMILY MEDICINE | Facility: CLINIC | Age: 54
End: 2019-09-25
Payer: COMMERCIAL

## 2019-09-25 VITALS
RESPIRATION RATE: 16 BRPM | WEIGHT: 155 LBS | DIASTOLIC BLOOD PRESSURE: 68 MMHG | OXYGEN SATURATION: 96 % | HEIGHT: 66 IN | HEART RATE: 66 BPM | BODY MASS INDEX: 24.91 KG/M2 | TEMPERATURE: 98.2 F | SYSTOLIC BLOOD PRESSURE: 111 MMHG

## 2019-09-25 DIAGNOSIS — R10.13 EPIGASTRIC PAIN: ICD-10-CM

## 2019-09-25 DIAGNOSIS — Z00.00 PREVENTATIVE HEALTH CARE: Primary | ICD-10-CM

## 2019-09-25 DIAGNOSIS — K21.00 REFLUX ESOPHAGITIS: ICD-10-CM

## 2019-09-25 RX ORDER — OMEPRAZOLE 40 MG/1
40 CAPSULE, DELAYED RELEASE ORAL DAILY
Qty: 30 CAPSULE | Refills: 1 | Status: SHIPPED | OUTPATIENT
Start: 2019-09-25 | End: 2019-11-26

## 2019-09-25 ASSESSMENT — MIFFLIN-ST. JEOR: SCORE: 1309.83

## 2019-09-25 NOTE — PROGRESS NOTES
"S: Kendra Chang is a 54 year old female who returns for follow up of  Gastritis/esophagitis.  On max  dose of  Omeprazole,  PPIs  Has  improved symptoms but not resolved   She is not taken any NSAIDs  But was on NSAIDsin the Past   Not smoker and does not drink  Patients states that main concern today is follow-up reflux    PMHX/PSHX/MEDS/ALLERGIES/SHX/FHX reviewed and updated in Epic.      ROS:  General: No fevers, chills  Head: No headache  Ears: No acute change in hearing.    CV: No chest pain or palpitations.  Resp: No shortness of breath.  No cough. No hemoptysis.  GI: No nausea, vomiting, constipation, diarrhea  : No urinary pains    O: /68 (BP Location: Right arm, Patient Position: Sitting, Cuff Size: Adult Regular)   Pulse 66   Temp 98.2  F (36.8  C) (Oral)   Resp 16   Ht 1.676 m (5' 6\")   Wt 70.3 kg (155 lb)   SpO2 96%   BMI 25.02 kg/m     Gen:  Well nourished and in NAD    CV:  RRR  - no murmurs, rubs, or gallups,   Pulm:  CTAB, no wheezes/rales/rhonchi, good air entry   ABD: soft, nontender, no masses, no rebound, BS intact throughout  Extrem: no cyanosis, edema or clubbing  Psych: Euthymic    1 Gastritis.reflux: continue Prilosec 40 mg    Can add Tums   H. pylori antigen   Reflux prevention discussed   GI referral was done    (Z00.00) Preventative health care  (primary encounter diagnosis)    Plan: PCS Use: Screening Digital Bilateral Mammogram          .    RTC after GI referral or sooner if develops new or worsening symptoms.    Elfego Tom MD      "

## 2019-09-25 NOTE — PATIENT INSTRUCTIONS
I think the reflux in your stomach is better but not resolved with prilosec 40mg. Continue taking the prilosec. We gave you refills.     We will test you for H. Pylori. You also have a referral for GI.     9/25/19    GASTROENTEROLOGY ADULT REF PROCEDURE ONLY  September 25, 2019 Online referral placed with [facility] who will contact patient to schedule.       [facility]  Phone [phone]  Fax: [phone]    Patient in clinic. I informed the patient that [facility] will be contacting them to schedule an appointment. I did offer to call [facility] and schedule the appointment while she is here. I told here that it may take a few minutes due to the series of questions that they will be asking.    Referral and demographic faxed to  [phone].    Jennifer Vega

## 2019-09-30 ENCOUNTER — TRANSFERRED RECORDS (OUTPATIENT)
Dept: HEALTH INFORMATION MANAGEMENT | Facility: CLINIC | Age: 54
End: 2019-09-30

## 2019-10-01 ENCOUNTER — HEALTH MAINTENANCE LETTER (OUTPATIENT)
Age: 54
End: 2019-10-01

## 2019-10-09 ENCOUNTER — TRANSFERRED RECORDS (OUTPATIENT)
Dept: HEALTH INFORMATION MANAGEMENT | Facility: CLINIC | Age: 54
End: 2019-10-09

## 2019-10-09 DIAGNOSIS — R10.13 EPIGASTRIC PAIN: ICD-10-CM

## 2019-10-11 LAB — H PYLORI ANTIGEN: NEGATIVE

## 2019-10-14 DIAGNOSIS — E03.9 HYPOTHYROIDISM, UNSPECIFIED TYPE: ICD-10-CM

## 2019-10-15 RX ORDER — LEVOTHYROXINE SODIUM 50 UG/1
50 TABLET ORAL DAILY
Qty: 90 TABLET | Refills: 2 | Status: SHIPPED | OUTPATIENT
Start: 2019-10-15 | End: 2020-07-21

## 2019-11-12 ENCOUNTER — OFFICE VISIT (OUTPATIENT)
Dept: FAMILY MEDICINE | Facility: CLINIC | Age: 54
End: 2019-11-12
Payer: COMMERCIAL

## 2019-11-12 VITALS
HEIGHT: 66 IN | BODY MASS INDEX: 24.85 KG/M2 | DIASTOLIC BLOOD PRESSURE: 72 MMHG | TEMPERATURE: 99.3 F | HEART RATE: 68 BPM | RESPIRATION RATE: 12 BRPM | WEIGHT: 154.6 LBS | SYSTOLIC BLOOD PRESSURE: 130 MMHG | OXYGEN SATURATION: 100 %

## 2019-11-12 DIAGNOSIS — Z96.641 STATUS POST RIGHT HIP REPLACEMENT: ICD-10-CM

## 2019-11-12 DIAGNOSIS — N30.00 ACUTE CYSTITIS WITHOUT HEMATURIA: Primary | ICD-10-CM

## 2019-11-12 LAB
BILIRUBIN UR: NEGATIVE MG/DL
BLOOD UR: NEGATIVE MG/DL
GLUCOSE URINE: NEGATIVE
KETONES UR QL: NEGATIVE MG/DL
LEUKOCYTE ESTERASE UR: ABNORMAL
NITRITE UR QL STRIP: NEGATIVE MG/DL
PH UR STRIP: 6 [PH] (ref 4.5–8)
PROTEIN UR: NEGATIVE MG/DL
SP GR UR STRIP: 1.01 (ref 1–1.03)
UROBILINOGEN UR STRIP-ACNC: ABNORMAL E.U./DL

## 2019-11-12 RX ORDER — SULFAMETHOXAZOLE/TRIMETHOPRIM 800-160 MG
1 TABLET ORAL ONCE
Status: DISCONTINUED | OUTPATIENT
Start: 2019-11-12 | End: 2019-11-12

## 2019-11-12 RX ORDER — SULFAMETHOXAZOLE/TRIMETHOPRIM 800-160 MG
1 TABLET ORAL 2 TIMES DAILY
Qty: 14 TABLET | Refills: 0 | Status: SHIPPED | OUTPATIENT
Start: 2019-11-12 | End: 2019-11-26

## 2019-11-12 ASSESSMENT — MIFFLIN-ST. JEOR: SCORE: 1307.76

## 2019-11-12 NOTE — LETTER
November 14, 2019      Kendra Chang  [address] [place] [address]        Dear Kendra,    Please see below for your test results.  No bacteria in urine, finish antibiotic and follow-up in clinic   In 1 to 2 weeks     Resulted Orders   Urinalysis, Micro If ([facility])   Result Value Ref Range    Specific Gravity Urine 1.010 1.005 - 1.030    pH Urine 6.0 4.5 - 8.0    Leukocyte Esterase UR Trace (A) NEGATIVE    Nitrite Urine Negative NEGATIVE mg/dL    Protein UR Negative NEGATIVE mg/dL    Glucose Urine Negative NEGATIVE    Ketones Urine Negative NEGATIVE mg/dL    Urobilinogen mg/dL 0.2 E.U./dL 0.2 E.U./dL E.U./dL    Bilirubin UR Negative NEGATIVE mg/dL    Blood UR Negative NEGATIVE mg/dL    Narrative    QNS for urine micro. DL   Urine Culture ([facility])   Result Value Ref Range    Culture SEE RESULTS BELOW       Comment:      CULTURE, URINE   SOURCE: Urine, Clean Catch   CULTURE RESULTS:    No Growth      Narrative    Test performed by:  [facility]  [address] [place] [address]       If you have any questions, please call the clinic to make an appointment.    Sincerely,    Elfego Tom MD

## 2019-11-12 NOTE — PROGRESS NOTES
"S: Kendra Chang is a 54 year old female who  Reports cramping in lower abdomen, urinary frequency and pain over suprapubic region   Hysterectomy a long time ago, and partial oopherectomy  Patients states that main concern today is  Urinary requency    PMHX/PSHX/MEDS/ALLERGIES/SHX/FHX reviewed and updated in Epic.      ROS:  General: No fevers, chills  Head: No headache  Ears: No acute change in hearing.    CV: No chest pain or palpitations.  Resp: No shortness of breath.  No cough. No hemoptysis.  GI: No nausea, vomiting, constipation, diarrhea  : No urinary pains    O: /72 (BP Location: Right arm)   Pulse 68   Temp 99.3  F (37.4  C) (Oral)   Resp 12   Ht 1.676 m (5' 5.98\")   Wt 70.1 kg (154 lb 9.6 oz)   SpO2 100%   BMI 24.97 kg/m     Gen:  Well nourished and in NAD    Neck: supple without lymphadenopathy  CV:  RRR  - no murmurs, rubs, or gallups,   Pulm:  CTAB, no wheezes/rales/rhonchi, good air entry   ABD: soft, nontender, no masses, no rebound, BS intact throughout  Extrem: no cyanosis, edema or clubbing  Psych: Euthymic    Tender over bladder No CVA tenderness    A/P   1.-(N30.00) Acute cystitis without hematuria    Plan: Urinalysis, Micro If ([facility])    Leuk est positive   culture urine   septra ds for 5 days  2.   Unilateral osteoarth resulting from hip dysplasia, right hip         Spondyls w/o myelopathy or radiculopathy, lumbosacr region         Chronic pain syndrome      Follows with ortho  Follows with pain clinic  Disability  Parking  permit       RTC in 2  weeks, for follow up of UTIor sooner if develops new or worsening symptoms.    Elfego Tom MD      "

## 2019-11-12 NOTE — PATIENT INSTRUCTIONS
aplenty  of fluids   septra twice a day  for 5 to 7 days   follow-up 1 to 2 weeks    disability permit done

## 2019-11-13 LAB — CULTURE: NORMAL

## 2019-11-26 ENCOUNTER — OFFICE VISIT (OUTPATIENT)
Dept: FAMILY MEDICINE | Facility: CLINIC | Age: 54
End: 2019-11-26
Payer: COMMERCIAL

## 2019-11-26 VITALS
DIASTOLIC BLOOD PRESSURE: 70 MMHG | SYSTOLIC BLOOD PRESSURE: 106 MMHG | BODY MASS INDEX: 25 KG/M2 | OXYGEN SATURATION: 98 % | HEART RATE: 60 BPM | RESPIRATION RATE: 16 BRPM | TEMPERATURE: 98.3 F | WEIGHT: 154.8 LBS

## 2019-11-26 DIAGNOSIS — N30.00 ACUTE CYSTITIS WITHOUT HEMATURIA: ICD-10-CM

## 2019-11-26 DIAGNOSIS — K21.00 REFLUX ESOPHAGITIS: ICD-10-CM

## 2019-11-26 DIAGNOSIS — N30.00 ACUTE CYSTITIS WITHOUT HEMATURIA: Primary | ICD-10-CM

## 2019-11-26 DIAGNOSIS — N32.81 OVERACTIVE BLADDER: Primary | ICD-10-CM

## 2019-11-26 LAB
BACTERIA: NORMAL
BILIRUBIN UR: NEGATIVE MG/DL
BLOOD UR: NEGATIVE MG/DL
CASTS: NORMAL /LPF
CRYSTAL URINE: NORMAL /LPF
EPITHELIAL CELLS UR: NORMAL /LPF (ref 0–2)
GLUCOSE URINE: NEGATIVE
KETONES UR QL: NEGATIVE MG/DL
LEUKOCYTE ESTERASE UR: ABNORMAL
MUCOUS URINE: NORMAL LPF
NITRITE UR QL STRIP: NEGATIVE MG/DL
PH UR STRIP: 6.5 [PH] (ref 4.5–8)
PROTEIN UR: NEGATIVE MG/DL
RBC URINE: NORMAL /HPF
SP GR UR STRIP: 1.02 (ref 1–1.03)
UROBILINOGEN UR STRIP-ACNC: ABNORMAL E.U./DL
WBC URINE: NORMAL /HPF

## 2019-11-26 RX ORDER — OMEPRAZOLE 40 MG/1
40 CAPSULE, DELAYED RELEASE ORAL DAILY
Qty: 30 CAPSULE | Refills: 1 | Status: SHIPPED | OUTPATIENT
Start: 2019-11-26 | End: 2020-03-10

## 2019-11-26 RX ORDER — OXYBUTYNIN CHLORIDE 5 MG/1
5 TABLET, EXTENDED RELEASE ORAL DAILY
Qty: 90 TABLET | Refills: 1 | Status: SHIPPED | OUTPATIENT
Start: 2019-11-26 | End: 2024-09-26

## 2019-11-26 NOTE — PROGRESS NOTES
There are no exam notes on file for this visit.    SUBJECTIVE  Kendra Chang is a 54 year old female with past medical history significant for    Patient Active Problem List   Diagnosis     Other atopic dermatitis and related conditions     Contact dermatitis and other eczema, due to unspecified cause     Rosacea     Health Care Home     Hip pain     S/P hip replacement     H/O: hysterectomy     Hx of ectopic pregnancy     Arm fracture     S/P revision of total hip     Hypothyroidism     DJD (degenerative joint disease), lumbar     Chronic pain syndrome     Others present at the visit:  None    Presents for   Chief Complaint   Patient presents with     Follow Up     Pt is here to follow up on bladder issues.     Medication Reconciliation     Complete.      Patient presents today to follow-up on recent urinary tract infection symptoms.  She reports continued difficulty with urination.  She describes a pinching type feeling in her belly, as well as a strong urge to urinate.  This is particularly bothersome at night and means she is not sleeping well.  She had symptoms during the day 2.  Feels like she has difficulty fully emptying her bladder, and feels like she has an urge to go right away, sometimes she is unable to get all the way to the bathroom.  She reports 3 previous vaginal deliveries and a history of 3 previous tubal pregnancies.  She had a hysterectomy done in 1993.  Does not know if she is been through menopause or not.  She does not actually have pain with urination, just prior to urination she has noticed no blood or change in her urine.  She denies constipation and has bowel movements every other day that are relatively soft.    She reports a family history of ureteral cancer in her mother, who is a long-term smoker.  Patient was also a smoker previously, but has quit many years ago after turning this decision over to the Lord.  She denies any vaginal dryness or irritation.    We discussed that the  urine culture completed last visit came back negative for any specific organisms.    OBJECTIVE:  Vitals: /70 (BP Location: Left arm, Patient Position: Sitting, Cuff Size: Adult Regular)   Pulse 60   Temp 98.3  F (36.8  C) (Oral)   Resp 16   Wt 70.2 kg (154 lb 12.8 oz)   SpO2 98%   BMI 25.00 kg/m    BMI= Body mass index is 25 kg/m .  Objective:    Vitals:  Vitals are reviewed and are within the normal range  Gen:  Alert, pleasant, no acute distress  Abdomen: Belly is nontender.  No CVA tenderness on exam either.  Psych: Pleasant comfortable no acute distress.    Results for orders placed or performed in visit on 11/26/19   Urinalysis, Micro If ([facility])     Status: Abnormal   Result Value Ref Range    Specific Gravity Urine 1.020 1.005 - 1.030    pH Urine 6.5 4.5 - 8.0    Leukocyte Esterase UR Trace (A) NEGATIVE    Nitrite Urine Negative NEGATIVE mg/dL    Protein UR Negative NEGATIVE mg/dL    Glucose Urine Negative NEGATIVE    Ketones Urine Negative NEGATIVE mg/dL    Urobilinogen mg/dL 0.2 E.U./dL 0.2 E.U./dL E.U./dL    Bilirubin UR Negative NEGATIVE mg/dL    Blood UR Negative NEGATIVE mg/dL   Urine Microscopic ([facility])     Status: None   Result Value Ref Range    WBC Urine None <5 /hpf    RBC Urine None <5 /hpf    Epithelial Cells UR 2-5 0 - 2 /lpf    Mucous Urine None NONE lpf    Casts Urine hyaline  NONE /lpf    Crystal Urine None NONE /lpf    Bacteria Wet Prep Few None       ASSESSMENT AND PLAN:      Kendra was seen today for follow up and medication reconciliation.  Here with continued bladder pain.  Urine looks fairly bland today as well.  Will send for culture as well as Ureaplasma testing.  I suspect symptoms are more secondary to urge incontinence, and we will do a trial of oxybutynin to see if this improves her symptoms.  Patient will follow-up if symptoms are not improving.    Diagnoses and all orders for this visit:    Overactive bladder  -     oxybutynin ER (DITROPAN-XL) 5 MG 24 hr tablet;  Take 1 tablet (5 mg) by mouth daily    Acute cystitis without hematuria  -     Urinalysis, Micro If ([facility])  -     Urine Microscopic ([facility])  -     Urine Culture ([facility])  -     Cancel: Ureaplasma PCR ([facility])  -     oxybutynin ER (DITROPAN-XL) 5 MG 24 hr tablet; Take 1 tablet (5 mg) by mouth daily  -     Ureaplasma PCR ([facility])    Reflux esophagitis  -     omeprazole (PRILOSEC) 40 MG DR capsule; Take 1 capsule (40 mg) by mouth daily      Patient Instructions   Refill omeprazole  Try the new bladder medication, 1 pill per day  Let us know if you have problems.      Follow up if symptoms fail to improve.      Lashanda Jiménez MD

## 2019-11-26 NOTE — PATIENT INSTRUCTIONS
Refill omeprazole  Try the new bladder medication, 1 pill per day  Let us know if you have problems.

## 2019-11-27 LAB — CULTURE: NORMAL

## 2019-11-27 NOTE — RESULT ENCOUNTER NOTE
Results discussed with patient in clinic.  Still waiting on ureaplasma and urine culture.  I will let patient know when these results come back.      Lashanda Jiménez MD

## 2020-01-15 ENCOUNTER — OFFICE VISIT (OUTPATIENT)
Dept: FAMILY MEDICINE | Facility: CLINIC | Age: 55
End: 2020-01-15
Payer: COMMERCIAL

## 2020-01-15 VITALS
RESPIRATION RATE: 20 BRPM | DIASTOLIC BLOOD PRESSURE: 69 MMHG | TEMPERATURE: 98.2 F | WEIGHT: 155 LBS | HEART RATE: 73 BPM | BODY MASS INDEX: 26.46 KG/M2 | SYSTOLIC BLOOD PRESSURE: 104 MMHG | OXYGEN SATURATION: 100 % | HEIGHT: 64 IN

## 2020-01-15 DIAGNOSIS — Z01.818 PREOP GENERAL PHYSICAL EXAM: Primary | ICD-10-CM

## 2020-01-15 DIAGNOSIS — Z23 NEED FOR VACCINATION: ICD-10-CM

## 2020-01-15 LAB
% GRANULOCYTES: 65.7 %G (ref 40–75)
ALBUMIN SERPL BCP-MCNC: 3.8 G/DL (ref 3.5–5)
ALP SERPL-CCNC: 81 U/L (ref 45–120)
ALT SERPL W/O P-5'-P-CCNC: 23 U/L (ref 0–45)
ANION GAP SERPL CALCULATED.3IONS-SCNC: 9 MMOL/L (ref 5–18)
AST SERPL-CCNC: 31 U/L (ref 0–40)
BILIRUB DIRECT SERPL-MCNC: <0.1 MG/DL (ref 0–0.5)
BILIRUB SERPL-MCNC: 0.2 MG/DL (ref 0–1)
BUN SERPL-MCNC: 6 MG/DL (ref 8–22)
CALCIUM SERPL-MCNC: 9.1 MG/DL (ref 8.5–10.5)
CHLORIDE SERPL-SCNC: 105 MMOL/L (ref 98–107)
CO2 SERPL-SCNC: 28 MMOL/L (ref 22–31)
CREAT SERPL-MCNC: 0.72 MG/DL (ref 0.6–1.1)
GLUCOSE SERPL-MCNC: 100 MG/DL (ref 70–125)
GRANULOCYTES #: 3.4 K/UL (ref 1.6–8.3)
HBA1C MFR BLD: 5.2 % (ref 4.1–5.7)
HCT VFR BLD AUTO: 36.1 % (ref 35–47)
HEMOGLOBIN: 11.3 G/DL (ref 11.7–15.7)
LYMPHOCYTES # BLD AUTO: 1.4 K/UL (ref 0.8–5.3)
LYMPHOCYTES NFR BLD AUTO: 27.9 %L (ref 20–48)
MCH RBC QN AUTO: 30.9 PG (ref 26.5–35)
MCHC RBC AUTO-ENTMCNC: 31.3 G/DL (ref 32–36)
MCV RBC AUTO: 98.5 FL (ref 78–100)
MID #: 0.3 K/UL (ref 0–2.2)
MID %: 6.4 %M (ref 0–20)
PLATELET # BLD AUTO: 298 K/UL (ref 150–450)
POTASSIUM SERPL-SCNC: 3.9 MMOL/L (ref 3.5–5)
PROT SERPL-MCNC: 6.3 G/DL (ref 6–8)
RBC # BLD AUTO: 3.7 M/UL (ref 3.8–5.2)
SODIUM SERPL-SCNC: 142 MMOL/L (ref 136–145)
WBC # BLD AUTO: 5.1 K/UL (ref 4–11)

## 2020-01-15 ASSESSMENT — MIFFLIN-ST. JEOR: SCORE: 1270.14

## 2020-01-15 NOTE — LETTER
January 16, 2020      Kendra Chang  [address] [place] [place] [address]        Dear Kendra,  No diabetes, no anemia, lever and kidney are ok    Follow-up labs as needed only   Please see below for your test results.    Resulted Orders   Hemoglobin A1c ([facility])   Result Value Ref Range    Hemoglobin A1C 5.2 4.1 - 5.7 %   CBC with Diff Plt ([facility])   Result Value Ref Range    WBC 5.1 4.0 - 11.0 K/uL    Lymphocytes # 1.4 0.8 - 5.3 K/uL    % Lymphocytes 27.9 20.0 - 48.0 %L    Mid # 0.3 0.0 - 2.2 K/uL    Mid % 6.4 0.0 - 20.0 %M    GRANULOCYTES # 3.4 1.6 - 8.3 K/uL    % Granulocytes 65.7 40.0 - 75.0 %G    RBC 3.7 (L) 3.8 - 5.2 M/uL    Hemoglobin 11.3 (L) 11.7 - 15.7 g/dL    Hematocrit 36.1 35.0 - 47.0 %    MCV 98.5 78.0 - 100.0 fL    MCH 30.9 26.5 - 35.0    MCHC 31.3 (L) 32.0 - 36.0 g/dL    Platelets 298.0 150.0 - 450.0 K/uL   Basic Metabolic Profile ([facility])   Result Value Ref Range    Sodium 142 136 - 145 mmol/L    Potassium 3.9 3.5 - 5.0 mmol/L    Chloride 105 98 - 107 mmol/L    CO2, Total 28 22 - 31 mmol/L    Anion Gap 9 5 - 18 mmol/L    Glucose 100 70 - 125 mg/dL    Calcium 9.1 8.5 - 10.5 mg/dL    Urea Nitrogen 6 (L) 8 - 22 mg/dL    Creatinine 0.72 0.60 - 1.10 mg/dL    GFR Estimate If Black >60 >60 mL/min/1.73m2    GFR Estimate >60 >60 mL/min/1.73m2    Narrative    Test performed by:  [facility]  [address] [place] [address]  Fasting Glucose reference range is 70-99 mg/dL per  American Diabetes Association (ADA) guidelines.   Hepatic Profile ([facility])   Result Value Ref Range    Bilirubin Total 0.2 0.0 - 1.0 mg/dL    Bilirubin Direct <0.1 <=0.5 mg/dL    Protein Total 6.3 6.0 - 8.0 g/dL    Albumin 3.8 3.5 - 5.0 g/dL    Alkaline Phosphatase 81 45 - 120 U/L    AST (SGOT) 31 0 - 40 U/L    ALT (SGPT) 23 0 - 45 U/L    Narrative    Test performed by:  [facility]  [address] [place] [address]       If you have any questions, please call the clinic to make an  appointment.    Sincerely,    Elfego Tom MD

## 2020-01-15 NOTE — PROGRESS NOTES
[facility] [address] [place] [address]  Phone: [phone]  Fax: [phone]    1/15/2020    Adult PRE-OP Evaluation:    Kendra Chang, 1965 presents for pre-operative evaluation and assessment as requested by Dr. Zeyad lindsey, prior to undergoing surgery/procedure for treatment of  Hip pain right.    Proposed procedure: DRG    Date of Surgery/ Procedure: 1/24/2020  Hospital/Surgical Facility: [facility]     Primary Physician: Elfego Tom  Type of Anesthesia Anticipated: general  History of anesthesia complications: NONE  History of  abnormal bleeding: NONE   History of blood transfusions: yes  Patient has a Health Care Directive or Living Will:  NO    Preoperative Questions   1. NO - Do you have a history of heart attack, stroke, stent, bypass or surgery on an artery in the head, neck, heart or legs?  2. NO - Do you ever have any pain or discomfort in your chest?  3. NO - Have you ever had a severe pain across the front of your chest lasting for half an hour or more?  4. NO - Do you have a history of Congestive Heart Failure?  5. NO - Are you troubled by shortness of breath when: walking on the level/ up a slight hill/ at night?  6. NO - Does your chest ever sound wheezy or whistling?  7. NO - Do you currently have a cold, bronchitis or other respiratory infection?  8. NO - Have you had a cold, bronchitis or other respiratory infection within the last 2 weeks?  9. NO - Do you usually have a cough?  10. NO - Do you sometimes get pains in the calves of your legs when you walk?  11. NO - Do you or anyone in your family have previous history of blood clots?  12. NO - Do you or does anyone in your family have a serious bleeding problem such as prolonged bleeding following surgeries or cuts?  13. NO - Have you ever had problems with anemia or been told to take iron pills?  14. NO - Have you had any abnormal blood loss such as black, tarry or bloody stools, or abnormal vaginal bleeding?  15. NO -  "Have you ever had a blood transfusion?  16. NO - Have you or any of your relatives ever had problems with anesthesia?  17. NO - Do you have sleep apnea, excessive snoring or daytime drowsiness?  18. NO - Do you have any prosthetic heart valves?  19. NO - Do you have prosthetic joints?  20. NO - Is there any chance that you may be pregnant?    Patient Active Problem List   Diagnosis     Other atopic dermatitis and related conditions     Contact dermatitis and other eczema, due to unspecified cause     Rosacea     Health Care Home     Hip pain     S/P hip replacement     H/O: hysterectomy     Hx of ectopic pregnancy     Arm fracture     S/P revision of total hip     Hypothyroidism     DJD (degenerative joint disease), lumbar     Chronic pain syndrome       Cholecalciferol (VITAMIN D) 400 UNITS capsule, Take 1 capsule by mouth daily.  HYDROcodone-acetaminophen (NORCO) 7.5-325 MG per tablet,   IBUPROFEN PO,   levothyroxine (SYNTHROID/LEVOTHROID) 50 MCG tablet, Take 1 tablet (50 mcg) by mouth daily  lisinopril (PRINIVIL/ZESTRIL) 20 MG tablet, Take 1 tablet (20 mg) by mouth daily  Multiple Vitamin (MULTI-VITAMIN PO), Take  by mouth.  omeprazole (PRILOSEC) 40 MG DR capsule, Take 1 capsule (40 mg) by mouth daily  ASPIRIN PO, Take 81 mg by mouth  CELECOXIB PO, Take 200 mg by mouth  GABAPENTIN PO, Take 300 mg by mouth  OMEPRAZOLE PO,   oxybutynin ER (DITROPAN-XL) 5 MG 24 hr tablet, Take 1 tablet (5 mg) by mouth daily (Patient not taking: Reported on 1/15/2020)  OXYCODONE HCL PO,     No current facility-administered medications on file prior to visit.       OTC products: None, except as noted above    Allergies   Allergen Reactions     Novocain [Procaine Hcl] Shortness Of Breath     Compazine      Feels like jaw is going out of socket     Latex Hives and Itching     Metoclopramide      \"dystonic reaction\"      Morphine Hcl Hives     Penicillins      Phenothiazines      \"dystonic reaction\"      Procaine      Pt denies allergy. " "Had tachycardia in dental office after local injecion, likely due to epinephrine.     Reglan Other (See Comments)     Joint issue with jaw, feels painful     Latex Allergy: YES: Precautions to take:  rash    Social History     Socioeconomic History     Marital status:      Spouse name: None     Number of children: None     Years of education: None     Highest education level: None   Occupational History     None   Social Needs     Financial resource strain: None     Food insecurity:     Worry: None     Inability: None     Transportation needs:     Medical: None     Non-medical: None   Tobacco Use     Smoking status: Former Smoker     Packs/day: 0.00     Last attempt to quit: 2/15/1985     Years since quittin.9     Smokeless tobacco: Never Used   Substance and Sexual Activity     Alcohol use: No     Drug use: No     Sexual activity: Not Currently     Birth control/protection: Abstinence   Lifestyle     Physical activity:     Days per week: None     Minutes per session: None     Stress: None   Relationships     Social connections:     Talks on phone: None     Gets together: None     Attends Voodoo service: None     Active member of club or organization: None     Attends meetings of clubs or organizations: None     Relationship status: None     Intimate partner violence:     Fear of current or ex partner: None     Emotionally abused: None     Physically abused: None     Forced sexual activity: None   Other Topics Concern     Parent/sibling w/ CABG, MI or angioplasty before 65F 55M? Not Asked   Social History Narrative     None       REVIEW OF SYSTEMS:   Constitutional, HEENT, cardiovascular, pulmonary, gi and gu systems are negative, except as otherwise noted.    EXAM:     Patient Vitals for the past 24 hrs:   BP Temp Temp src Pulse Resp SpO2 Height Weight   01/15/20 1438 104/69 98.2  F (36.8  C) Oral 73 20 100 % 1.613 m (5' 3.5\") 70.3 kg (155 lb)     Body mass index is 27.03 kg/m .  GENERAL: healthy, " alert and no distress  EYES: Eyes grossly normal to inspection, extraocular movements - intact, and PERRL  HENT: ear canals- normal; TMs- normal; Nose- normal; Mouth- no ulcers, no lesions  NECK: no tenderness, no adenopathy, no asymmetry, no masses, no stiffness; thyroid- normal to palpation  RESP: lungs clear to auscultation - no rales, no rhonchi, no wheezes  CV: regular rates and rhythm, normal S1 S2, no S3 or S4 and no murmur, no click or rub -  ABDOMEN: soft, no tenderness, no  hepatosplenomegaly, no masses, normal bowel sounds  MS: extremities- no gross deformities noted, no edema  SKIN: no suspicious lesions, no rashes  NEURO: strength and tone- normal, sensory exam- grossly normal, mentation- intact, speech- normal, reflexes- symmetric  BACK: no CVA tenderness, no paralumbar tenderness  PSYCH: Alert and oriented times 3; speech- coherent , normal rate and volume; able to articulate logical thoughts  LYMPHATICS: ant. cervical- normal, post. cervical- normal    DIAGNOSTICS:      EKG: appears normal, NSR, Normal Sinus Rhythm, normal axis, normal intervals, no acute ST/T changes c/w ischemia, no LVH by voltage criteria, unchanged from previous tracings    RISK ASSESSMENT:     Cardiovascular Risk:  -Patient is able to perform ADL's without assistance without chest pain.  -The patient does not have chest pain with exertion.  -Patient does not have a history of congestive heart failure.    -The patient does not have a history of stroke and does not have a history of valvular disease.    Pulmonary Risk:  -In terms of risk factors for pulmonary complication, the patient has no risk factors    Perioperative Complications:  -The patient does not have a history of bleeding or clotting problems in the past.    -The patient has not had complications from surgeries.    -The patient does not have a family history of any anesthesia or surgical complications.      IMPRESSION:   Reason for surgery/procedure:  Hip pain    The  proposed surgical procedure is considered INTERMEDIATE risk.    For above listed surgery and anesthesia:   Patient is at moderate risk for surgery/procedure and perioperative/procedure complications.    RECOMMENDATIONS:     Labs:  CBC,BMP, liver panel EKG A1C  All normal    Preop Plan:  --Approval given to proceed with proposed procedure, without further diagnostic evaluation    Medications:  Patient should take their regular medications the morning of surgery unless otherwise instructed.    Hold aspirin 7 days prior to surgery.      Elfego Tom MD    Please contact our office if there are any further questions or information required about this patient.

## 2020-01-20 ENCOUNTER — TELEPHONE (OUTPATIENT)
Dept: FAMILY MEDICINE | Facility: CLINIC | Age: 55
End: 2020-01-20

## 2020-01-20 NOTE — TELEPHONE ENCOUNTER
[facility] Medicine phone call message- general phone call:    Reason for call: Would like to inform  Dr Tom that surg will be general genia and not epidural. He will need to ok her for surg with general genia and addend the note.      Action desired: please update preop and fax back. FAX # [phone].    Return call needed: No    OK to leave a message on voice mail? Yes    Advised patient to response may take up to 2 business days: No    Primary language: English      needed? No    Call taken on January 20, 2020 at 12:48 PM by Erendira Wynne

## 2020-01-21 NOTE — TELEPHONE ENCOUNTER
Updated note faxed per request. Left message for [facility] informing them of fax, instructions to call if any question. ./LR

## 2020-03-10 ENCOUNTER — TRANSFERRED RECORDS (OUTPATIENT)
Dept: HEALTH INFORMATION MANAGEMENT | Facility: CLINIC | Age: 55
End: 2020-03-10

## 2020-03-10 DIAGNOSIS — K21.00 REFLUX ESOPHAGITIS: ICD-10-CM

## 2020-03-10 RX ORDER — OMEPRAZOLE 40 MG/1
40 CAPSULE, DELAYED RELEASE ORAL DAILY
Qty: 30 CAPSULE | Refills: 0 | Status: SHIPPED | OUTPATIENT
Start: 2020-03-10 | End: 2020-03-31

## 2020-03-31 DIAGNOSIS — K21.00 REFLUX ESOPHAGITIS: ICD-10-CM

## 2020-03-31 RX ORDER — OMEPRAZOLE 40 MG/1
40 CAPSULE, DELAYED RELEASE ORAL DAILY
Qty: 30 CAPSULE | Refills: 0 | Status: SHIPPED | OUTPATIENT
Start: 2020-03-31 | End: 2020-05-05

## 2020-05-05 DIAGNOSIS — K21.00 REFLUX ESOPHAGITIS: ICD-10-CM

## 2020-05-05 RX ORDER — OMEPRAZOLE 40 MG/1
40 CAPSULE, DELAYED RELEASE ORAL DAILY
Qty: 30 CAPSULE | Refills: 0 | Status: SHIPPED | OUTPATIENT
Start: 2020-05-05 | End: 2020-06-03

## 2020-06-03 DIAGNOSIS — K21.00 REFLUX ESOPHAGITIS: ICD-10-CM

## 2020-06-03 RX ORDER — OMEPRAZOLE 40 MG/1
40 CAPSULE, DELAYED RELEASE ORAL DAILY
Qty: 30 CAPSULE | Refills: 0 | Status: SHIPPED | OUTPATIENT
Start: 2020-06-03 | End: 2020-06-29

## 2020-06-29 DIAGNOSIS — K21.00 REFLUX ESOPHAGITIS: ICD-10-CM

## 2020-06-29 DIAGNOSIS — I10 ESSENTIAL HYPERTENSION: ICD-10-CM

## 2020-06-29 RX ORDER — OMEPRAZOLE 40 MG/1
40 CAPSULE, DELAYED RELEASE ORAL DAILY
Qty: 30 CAPSULE | Refills: 0 | Status: SHIPPED | OUTPATIENT
Start: 2020-06-29 | End: 2020-08-04

## 2020-06-29 RX ORDER — LISINOPRIL 20 MG/1
20 TABLET ORAL DAILY
Qty: 90 TABLET | Refills: 0 | Status: SHIPPED | OUTPATIENT
Start: 2020-06-29 | End: 2021-01-11

## 2020-07-17 DIAGNOSIS — E03.9 HYPOTHYROIDISM, UNSPECIFIED TYPE: ICD-10-CM

## 2020-07-21 RX ORDER — LEVOTHYROXINE SODIUM 50 UG/1
50 TABLET ORAL DAILY
Qty: 90 TABLET | Refills: 0 | Status: SHIPPED | OUTPATIENT
Start: 2020-07-21 | End: 2020-10-21

## 2020-08-04 DIAGNOSIS — K21.00 REFLUX ESOPHAGITIS: ICD-10-CM

## 2020-08-04 RX ORDER — OMEPRAZOLE 40 MG/1
CAPSULE, DELAYED RELEASE ORAL
Qty: 30 CAPSULE | Refills: 0 | Status: SHIPPED | OUTPATIENT
Start: 2020-08-04 | End: 2020-08-10

## 2020-08-07 ENCOUNTER — TELEPHONE (OUTPATIENT)
Dept: FAMILY MEDICINE | Facility: CLINIC | Age: 55
End: 2020-08-07

## 2020-08-07 DIAGNOSIS — K21.00 REFLUX ESOPHAGITIS: ICD-10-CM

## 2020-08-10 RX ORDER — OMEPRAZOLE 40 MG/1
40 CAPSULE, DELAYED RELEASE ORAL DAILY
Qty: 90 CAPSULE | Refills: 1 | Status: SHIPPED | OUTPATIENT
Start: 2020-08-10 | End: 2021-02-15

## 2020-08-12 ENCOUNTER — OFFICE VISIT (OUTPATIENT)
Dept: FAMILY MEDICINE | Facility: CLINIC | Age: 55
End: 2020-08-12
Payer: COMMERCIAL

## 2020-08-12 VITALS
HEART RATE: 58 BPM | BODY MASS INDEX: 27.58 KG/M2 | WEIGHT: 158.2 LBS | RESPIRATION RATE: 18 BRPM | DIASTOLIC BLOOD PRESSURE: 71 MMHG | SYSTOLIC BLOOD PRESSURE: 112 MMHG | TEMPERATURE: 98.4 F | OXYGEN SATURATION: 100 %

## 2020-08-12 DIAGNOSIS — Z01.818 PREOP GENERAL PHYSICAL EXAM: Primary | ICD-10-CM

## 2020-08-12 LAB
% GRANULOCYTES: 64.1 %G (ref 40–75)
BUN SERPL-MCNC: 8.9 MG/DL (ref 7–19)
CALCIUM SERPL-MCNC: 8.8 MG/DL (ref 8.5–10.1)
CHLORIDE SERPLBLD-SCNC: 100.5 MMOL/L (ref 98–110)
CO2 SERPL-SCNC: 28.4 MMOL/L (ref 20–32)
CREAT SERPL-MCNC: 0.8 MG/DL (ref 0.5–1)
GFR SERPL CREATININE-BSD FRML MDRD: 80.7 ML/MIN/1.7 M2
GLUCOSE SERPL-MCNC: 122.5 MG'DL (ref 70–99)
GRANULOCYTES #: 3.4 K/UL (ref 1.6–8.3)
HCT VFR BLD AUTO: 37.6 % (ref 35–47)
HEMOGLOBIN: 11.8 G/DL (ref 11.7–15.7)
LYMPHOCYTES # BLD AUTO: 1.5 K/UL (ref 0.8–5.3)
LYMPHOCYTES NFR BLD AUTO: 27.9 %L (ref 20–48)
MCH RBC QN AUTO: 31.4 PG (ref 26.5–35)
MCHC RBC AUTO-ENTMCNC: 31.4 G/DL (ref 32–36)
MCV RBC AUTO: 100 FL (ref 78–100)
MID #: 0.4 K/UL (ref 0–2.2)
MID %: 8 %M (ref 0–20)
PLATELET # BLD AUTO: 280 K/UL (ref 150–450)
POTASSIUM SERPL-SCNC: 4 MMOL/L (ref 3.2–4.6)
RBC # BLD AUTO: 3.8 M/UL (ref 3.8–5.2)
SODIUM SERPL-SCNC: 136.1 MMOL/L (ref 132–142)
TSH SERPL DL<=0.05 MIU/L-ACNC: 1.17 UIU/ML (ref 0.3–5)
WBC # BLD AUTO: 5.3 K/UL (ref 4–11)

## 2020-08-12 NOTE — PROGRESS NOTES
[facility] [address] [place] [address]  Phone: [phone]  Fax: [phone]    8/12/2020    Adult PRE-OP Evaluation:    Kendra Chang, 1965 presents for pre-operative evaluation and assessment as requested by [facility] pain clinic prior to undergoing surgery/procedure for treatment of  Back pain.    Proposed procedure:  nerve ablation  Right side for Back pian    Date of Surgery/ Procedure:8/17/20  Hospital/Surgical Facility:  [facility]     Primary Physician: Elfego Tom  Type of Anesthesia Anticipated:  sedation  History of anesthesia complications: NONE  History of  abnormal bleeding: NONE   History of blood transfusions:  Yes in remote past  Patient has a Health Care Directive or Living Will:  NO    Preoperative Questions   No - Have you ever had a heart attack or stroke?  No - Have you ever had surgery on your heart or blood vessels, such as a stent, coronary (heart) bypass, or surgery on an artery in the head, neck, heart, or legs?  No - Do you have chest pain when you are physically active?  No - Do you have a history of heart failure?  No - Do you currently have a cold, bronchitis, or symptoms of other respiratory (head and chest) infections?  No - Do you have a cough, shortness of breath, or wheezing?  No - Do you or anyone in your family have a history of blood clots?  No - Do you or anyone in your family have a serious bleeding problem, such as long-lasting bleeding after surgeries or cuts?  No - Have you ever had anemia or been told to take iron pills?  No - Have you had any abnormal blood loss such as black, tarry or bloody stools, or abnormal vaginal bleeding?  No - Have you ever had a blood transfusion? Yes in remote past  Yes - Are you willing to have a blood transfusion if it is medically needed before, during, or after your surgery?  No - Have you or anyone in your family ever had problems with anesthesia (sedation for surgery)?  No - Do you have sleep  "apnea, excessive snoring, or daytime drowsiness?   No - Do you have any artifical heart valves or other implanted medical devices, such as a pacemaker, defibrillator, or continuous glucose monitor?  Yes- Do you have any artifical joints? R hip   Yes - Are you allergic to latex? Yes   No - Is there any chance that you may be pregnant?    Patient Active Problem List   Diagnosis     Other atopic dermatitis and related conditions     Contact dermatitis and other eczema, due to unspecified cause     Rosacea     Health Care Home     Hip pain     S/P hip replacement     H/O: hysterectomy     Hx of ectopic pregnancy     Arm fracture     S/P revision of total hip     Hypothyroidism     DJD (degenerative joint disease), lumbar     Chronic pain syndrome       Current meds: Prilosec, synthroid, lisinopril Norco    OTC products: None, except as noted above    Allergies   Allergen Reactions     Novocain [Procaine Hcl] Shortness Of Breath     Compazine      Feels like jaw is going out of socket     Latex Hives and Itching     Metoclopramide      \"dystonic reaction\"      Morphine Hcl Hives     Penicillins      Phenothiazines      \"dystonic reaction\"      Procaine      Pt denies allergy. Had tachycardia in dental office after local injecion, likely due to epinephrine.     Reglan Other (See Comments)     Joint issue with jaw, feels painful     Latex Allergy: YES    Social History     Socioeconomic History     Marital status:      Spouse name: None     Number of children: None     Years of education: None     Highest education level: None   Occupational History     None   Social Needs     Financial resource strain: None     Food insecurity     Worry: None     Inability: None     Transportation needs     Medical: None     Non-medical: None   Tobacco Use     Smoking status: Former Smoker     Packs/day: 0.00     Last attempt to quit: 2/15/1985     Years since quittin.5     Smokeless tobacco: Never Used   Substance and Sexual " Activity     Alcohol use: No     Drug use: No     Sexual activity: Not Currently     Birth control/protection: Abstinence   Lifestyle     Physical activity     Days per week: None     Minutes per session: None     Stress: None   Relationships     Social connections     Talks on phone: None     Gets together: None     Attends Rastafarian service: None     Active member of club or organization: None     Attends meetings of clubs or organizations: None     Relationship status: None     Intimate partner violence     Fear of current or ex partner: None     Emotionally abused: None     Physically abused: None     Forced sexual activity: None   Other Topics Concern     Parent/sibling w/ CABG, MI or angioplasty before 65F 55M? Not Asked   Social History Narrative     None       REVIEW OF SYSTEMS:   Constitutional, HEENT, cardiovascular, pulmonary, gi and gu systems are negative, except as otherwise noted.    EXAM:     Patient Vitals for the past 24 hrs:   BP Temp Temp src Pulse Resp SpO2 Height Weight   08/12/20 1457 112/71 98.4  F (36.9  C) Oral 58 18 100 % -- 71.8 kg (158 lb 3.2 oz)     Body mass index is 27.58 kg/m .  GENERAL: healthy, alert and no distress  GENERAL: healthy    HENT: ear canals- normal; TMs- normal; Nose- normal; Mouth- no ulcers, no lesions  NECK: no tenderness, no adenopathy, no asymmetry, no masses, no stiffness; thyroid- normal to palpation  RESP: lungs clear to auscultation - no rales, no rhonchi, no wheezes  CV: regular rates and rhythm, normal S1 S2, no S3 or S4 and no murmur, no click or rub -  ABDOMEN: soft, no tenderness, no  hepatosplenomegaly, no masses, normal bowel sounds  MS: extremities- no gross deformities noted, no edema  SKIN: no suspicious lesions, no rashes  NEURO: strength and tone- normal, sensory exam- grossly normal, mentation- intact, speech- normal, reflexes- symmetric  BACK: no CVA tenderness, no paralumbar tenderness  PSYCH: Alert and oriented times 3; speech- coherent ,  normal rate and volume; able to articulate logical thoughts  LYMPHATICS: ant. cervical- normal, post. cervical- normal    DIAGNOSTICS:      EKG: appears normal, NSR, normal axis, normal intervals, no acute ST/T changes c/w ischemia, no LVH by voltage criteria, unchanged from previous tracings    RISK ASSESSMENT:     Cardiovascular Risk:  -Patient is able to perform ADL's without assistance without chest pain.  -The patient does not have chest pain with exertion.  -Patient does not have a history of congestive heart failure.    -The patient does not have a history of stroke and does not have a history of valvular disease.    Pulmonary Risk:  -In terms of risk factors for pulmonary complication, the patient has no risk factors    Perioperative Complications:  -The patient does not have a history of bleeding or clotting problems in the past.    -The patient has not had complications from surgeries.    -The patient does not have a family history of any anesthesia or surgical complications.      IMPRESSION:   Reason for surgery/procedure:  Back pain/DJD    The proposed surgical procedure is considered I low  risk.    For above listed surgery and anesthesia:   Patient is at  Low  risk for surgery/procedure and perioperative/procedure complications.    RECOMMENDATIONS:     Labs:   8/12/20  CBC/ BMP EKG: normal    Fasting:  Must be NPO for 6 hours preoperatively.    Preop Plan:  --Approval given to proceed with proposed procedure, without further diagnostic evaluation    Medications:  Patient should take their regular medications the morning of surgery unless otherwise instructed.    Hold aspirin 7 days prior to surgery.      Elfego Tom MD    Please contact our office if there are any further questions or information required about this patient.

## 2020-08-12 NOTE — LETTER
August 13, 2020      Kendra Chang  [address] [place] [address]        Dear ,    We are writing to inform you of your test results.    Thyroid result is normal, follow-up 6 to 12 months     Resulted Orders   Basic Metabolic Panel ([place])   Result Value Ref Range    Urea Nitrogen 8.9 7.0 - 19.0 mg/dL    Calcium 8.8 8.5 - 10.1 mg/dL    Chloride 100.5 98.0 - 110.0 mmol/L    Carbon Dioxide 28.4 20.0 - 32.0 mmol/L    Creatinine 0.8 0.5 - 1.0 mg/dL    Glucose 122.5 (H) 70.0 - 99.0 mg'dL    Potassium 4.0 3.2 - 4.6 mmol/L    Sodium 136.1 132.0 - 142.0 mmol/L    GFR Estimate 80.7 >60.0 mL/min/1.7 m2    GFR Estimate If Black >90 >60.0 mL/min/1.7 m2   CBC with Diff Plt ([facility])   Result Value Ref Range    WBC 5.3 4.0 - 11.0 K/uL    Lymphocytes # 1.5 0.8 - 5.3 K/uL    % Lymphocytes 27.9 20.0 - 48.0 %L    Mid # 0.4 0.0 - 2.2 K/uL    Mid % 8.0 0.0 - 20.0 %M    GRANULOCYTES # 3.4 1.6 - 8.3 K/uL    % Granulocytes 64.1 40.0 - 75.0 %G    RBC 3.8 (L) 3.8 - 5.2 M/uL    Hemoglobin 11.8 11.7 - 15.7 g/dL    Hematocrit 37.6 35.0 - 47.0 %    .0 78.0 - 100.0 fL    MCH 31.4 26.5 - 35.0    MCHC 31.4 (L) 32.0 - 36.0 g/dL    Platelets 280.0 150.0 - 450.0 K/uL   Thyroid [place] ([facility])   Result Value Ref Range    TSH 1.17 0.30 - 5.00 uIU/mL    Narrative    Test performed by:  [facility]  [address] [place] [address]       If you have any questions or concerns, please call the clinic at the number listed above.       Sincerely,        Elfego Tom MD

## 2020-08-17 ENCOUNTER — VIRTUAL VISIT (OUTPATIENT)
Dept: FAMILY MEDICINE | Facility: CLINIC | Age: 55
End: 2020-08-17
Payer: COMMERCIAL

## 2020-08-17 ENCOUNTER — TELEPHONE (OUTPATIENT)
Dept: FAMILY MEDICINE | Facility: CLINIC | Age: 55
End: 2020-08-17

## 2020-08-17 VITALS — WEIGHT: 155 LBS | BODY MASS INDEX: 25.83 KG/M2 | HEIGHT: 65 IN

## 2020-08-17 VITALS
SYSTOLIC BLOOD PRESSURE: 139 MMHG | DIASTOLIC BLOOD PRESSURE: 81 MMHG | TEMPERATURE: 99.1 F | HEART RATE: 64 BPM | OXYGEN SATURATION: 100 % | RESPIRATION RATE: 12 BRPM

## 2020-08-17 DIAGNOSIS — Z20.822 EXPOSURE TO 2019 NOVEL CORONAVIRUS: Primary | ICD-10-CM

## 2020-08-17 DIAGNOSIS — Z20.822 EXPOSURE TO 2019 NOVEL CORONAVIRUS: ICD-10-CM

## 2020-08-17 LAB
COVID-19 VIRUS PCR TO U OF MN - SOURCE: ABNORMAL
SARS-COV-2 RNA SPEC QL NAA+PROBE: ABNORMAL

## 2020-08-17 ASSESSMENT — MIFFLIN-ST. JEOR: SCORE: 1288.96

## 2020-08-17 NOTE — TELEPHONE ENCOUNTER
Grandchildren were exposed with pt to pt's  who tested positive. Wondering about getting tested. They're not pt's here.

## 2020-08-17 NOTE — PROGRESS NOTES
"Family Medicine Telephone Visit Note                   Telephone Visit Consent   Patient was verbally read the following and verbal consent was obtained.    \"Telephone visits are billed at different rates depending on your insurance coverage. During this emergency period, for some insurers they may be billed the same as an in-person visit.  Please reach out to your insurance provider with any questions.  If during the course of the call the physician/provider feels a telephone visit is not appropriate, you will not be charged for this service.\"    Name person giving consent:  Patient   Date verbal consent given:  8/17/2020  Time verbal consent given:  9:03 AM               Chief Complaint   Patient presents with     Covid 19 Testing     per pt  was tested positive for covid 19 on Saturday.  She wants to get tested as well her 2 of her grandkids. So far no symptoms. Had to cancel her surgery.                          HPI   Patients name: Gwendolyn  Appointment start time:  9:14 AM    Social Determinants of Health Screening  Food Insecurity:  Within the past 12 months, did you worry whether your food would run out before you would have money to buy more?  No    Within the past 12 months, did you find the food you bought just didn't last and/or you didn't have money to get more?  No        Financial Insecurity:  Because of COVID-19, many people are now eligible for programs such as unemployment and may qualify for a stimulus check from the [place] government.     Have you had trouble accessing these programs? No     Gwendolyn Chang is a 55 year old female who is presenting via telephone visit to discuss possible COVID-19 testing after exposure.  tested positive for COVID-19 on Saturday.  has had symptoms of abdominal pain, headache and dizziness for the past 6 days. Patient reports a headache, but, usually gets a headache in the morning before medicine. Denies fever/chills, nausea/vomiting/diarrhea, itchy/watery " "eyes, congestion, cough, abdominal pain. Two grandchildren also live with patient and , ages 5 and 11.  is self isolated, however, still living in house.       Current Outpatient Medications   Medication Sig Dispense Refill     levothyroxine (SYNTHROID/LEVOTHROID) 50 MCG tablet Take 1 tablet (50 mcg) by mouth daily 90 tablet 0     lisinopril (ZESTRIL) 20 MG tablet Take 1 tablet (20 mg) by mouth daily 90 tablet 0     OMEPRAZOLE PO        ASPIRIN PO Take 81 mg by mouth       CELECOXIB PO Take 200 mg by mouth       Cholecalciferol (VITAMIN D) 400 UNITS capsule Take 1 capsule by mouth daily.       GABAPENTIN PO Take 300 mg by mouth       HYDROcodone-acetaminophen (NORCO) 7.5-325 MG per tablet   0     IBUPROFEN PO        Multiple Vitamin (MULTI-VITAMIN PO) Take  by mouth.       omeprazole (PRILOSEC) 40 MG DR capsule Take 1 capsule (40 mg) by mouth daily 90 capsule 1     oxybutynin ER (DITROPAN-XL) 5 MG 24 hr tablet Take 1 tablet (5 mg) by mouth daily (Patient not taking: Reported on 1/15/2020) 90 tablet 1     OXYCODONE HCL PO        Allergies   Allergen Reactions     Novocain [Procaine Hcl] Shortness Of Breath     Compazine      Feels like jaw is going out of socket     Latex Hives and Itching     Metoclopramide      \"dystonic reaction\"      Morphine Hcl Hives     Penicillins      Phenothiazines      \"dystonic reaction\"      Procaine      Pt denies allergy. Had tachycardia in dental office after local injecion, likely due to epinephrine.     Reglan Other (See Comments)     Joint issue with jaw, feels painful              Physical Exam:     Ht 1.651 m (5' 5\")   Wt 70.3 kg (155 lb)   Breastfeeding No   BMI 25.79 kg/m    Estimated body mass index is 25.79 kg/m  as calculated from the following:    Height as of this encounter: 1.651 m (5' 5\").    Weight as of this encounter: 70.3 kg (155 lb).    Exam:  Constitutional: healthy, alert and no distress  Psychiatric: mentation appears normal and affect " normal/bright          Assessment and Plan     1. Exposure to 2019 novel coronavirus   tested positive for COVID-19 on Saturday, has been symptomatic, however, improving for the past 6 days. Patient asymptomatic at this time. Would like to have herself tested as well as her two grandchildren (ages 5 and 11) who live with her. Discussed importance of self isolation and counseled on COVID-19 virus and signs and symptoms to watch out for. Will come in to [facility] this morning for asymptomatic COVID-19 swab for her and her grandchildren.   - COVID-19 Virus PCR [facility] ([facility]); Future      Refilled medications that would be required in the next 3 months.     After Visit Information:  Patient declined AVS     No follow-ups on file.    Appointment end time: 9:25 AM  This is a telephone visit that took 11 minutes.      Clinician location:  [facility]    Sandra Burden MD PGY3  [facility]    I precepted today with Dr. Sally Early.

## 2020-08-17 NOTE — PROGRESS NOTES
Preceptor Attestation:    I talked to the patient on the phone and discussed the patient with the resident. I have verified the content of the note, which accurately reflects my assessment of the patient and the plan of care.   Supervising Physician:  Sally Early MD.

## 2020-08-17 NOTE — PROGRESS NOTES
Exposure to COVID-19 positive person () who tested positive on Saturday. Asymptomatic COVID-19 swab obtained. Will notify of results.   Vitals:  /81   Pulse 64   Temp 99.1  F (37.3  C) (Oral)   Resp 12   SpO2 100%     Sandra Burden MD PGY3  [facility]

## 2020-08-18 ENCOUNTER — TRANSFERRED RECORDS (OUTPATIENT)
Dept: HEALTH INFORMATION MANAGEMENT | Facility: CLINIC | Age: 55
End: 2020-08-18

## 2020-08-19 ENCOUNTER — TELEPHONE (OUTPATIENT)
Dept: FAMILY MEDICINE | Facility: CLINIC | Age: 55
End: 2020-08-19

## 2020-08-19 NOTE — TELEPHONE ENCOUNTER
Patient informed of positive PCR test for COVID-19.    Patient began experiencing symptoms on 8/18/20. Patient aware that per CDC guidelines, he/she/they can be released from isolation when the following criteria is met:    At least 10 days* have passed since symptom onset and  At least 24 hours have passed since resolution of fever without the use of fever-reducing medications and  Other symptoms have improved.    *A limited number of persons with severe illness may produce replication-competent virus beyond 10 days, that may warrant extending duration of isolation for up to 20 days after symptom onset. Consider consultation with infection control experts. See Discontinuation of Transmission-Based Precautions and Disposition of Patients with COVID-19 in Healthcare Settings (Interim Guidance).    Persons infected with SARS-CoV-2 who never develop COVID-19 symptoms may discontinue isolation and other precautions 10 days after the date of their first positive RT-PCR test for SARS-CoV-2 RNA.  (https://www.cdc.gov/coronavirus/2019-ncov/hcp/disposition-in-home-patients.html)    Patient was asymptomatic at time of test, however developed symptoms next day. Granddaughter had exposure as well and tested positive on 8/17/20. They live in the home with patient's  who tested positive prior to them. Recommended entire house isolate until at least 8/27/20.       Entire house positive for COVID-19 at this time, thus they will not need to isolate within the home.    If patient reports symptomatic and/or asymptomatic exposures in home: recommended having them call our clinic at [phone] or their own PCP to discuss testing. Recommended all exposures isolate for 14 days from last exposure to patient regardless of symptoms.    Legal screening:  Patient does not have paid time off at work. If no, ask if they would like assistance filing for unemployment. Patient declined, she is aware she can apply for unemployment but  "doesn't feel like \"going through all that.\" Instructed her to call back if she changed her mind and wanted assistance.    Patient aware to call the clinic with new or worsening symptoms, including shortness of breath and chest pain.            "

## 2020-08-20 ENCOUNTER — TELEPHONE (OUTPATIENT)
Dept: FAMILY MEDICINE | Facility: CLINIC | Age: 55
End: 2020-08-20

## 2020-08-20 NOTE — TELEPHONE ENCOUNTER
Attempted 3 outreach calls to patient, Kendra Chang, as part of the Get Well Loop.  Patient did not answer.  Left message that we would reach out again at a later date.    Corinne Praska  August 20, 2020

## 2020-08-21 NOTE — TELEPHONE ENCOUNTER
Attempted 3 outreach calls to patient with no response as part of Get Well Loop. Left a message saying that she will get a phone call another day.     Benita Gonzalez   August 21, 2020

## 2020-08-24 NOTE — TELEPHONE ENCOUNTER
Attempted 3 outreach calls to patient, Kendra Chang, as part of the Get Well Loop.  Patient did not answer.  Left message that we would reach out again at a later date    Chris Swenson  August 24, 2020

## 2020-08-25 NOTE — TELEPHONE ENCOUNTER
Attempted 3 outreach calls to patient, Kendra Chang, as part of the Get Well Loop.  Patient did not answer.  Left message that we would reach out again at a later date    Chris Swenson  August 25, 2020

## 2020-08-27 NOTE — TELEPHONE ENCOUNTER
"Patient called as part of COVID outreach for the [employer] Get Well Loop:  See transcript below:    I am calling today because your primary care provider is worried about symptoms of COVID and wanted us to reach out  to see how you are doing.  We will be calling everyday to check in until you are feeling better.  We also want to check in to make sure you have all the information and care you need during this time.  Do you have a few minutes to answer some questions?     Kendra Chang  1965  [place]     How are you feeling? Shortness of breath, lack of taste, lack of smell, fever (), chest tightness  What day did you start having symptoms?  08/17  Have you had cough, shortness of breath, or difficulty breathing in the last 14 days? YES, feels like chest tightness that does not allow her to take a full breath in. Sometimes happens at night-- \"I try to take a deep breath and it feels like my chest is too tight.\" This doesn't happen throughout the whole day, and comes on intermittently. Nothing in particular seems to be bringing it on or making it better. Patient denies any limitations on ambulation or activity due to the shortness of breath and has never had trouble speaking in full sentences due to the shortness of breath. Has had a cough the last two days (sometimes produces some greenish phlegm); the cough happens a couple times per day, not too often.  Have you have fevers, chills or sweats?  YES, no chills, no sweats  Have you had abdominal pain, nausea or vomiting?  YES, nausea over this last weekend that has since gotten better  Have you been able to eat and drink normally?  NO, forcing yourself to eat (only ate 2 cookies today)  Have you noticed a change in your sense of taste or smell?  NO  Have you been tested for COVID? YES  If Yes, what was the result?  POSITIVE  What date was the test completed? 08/18  Do you have any regular medical problems that you are worried " about with COVID? No     Patient Active Problem List   Diagnosis     Other atopic dermatitis and related conditions     Contact dermatitis and other eczema, due to unspecified cause     Rosacea     Health Care Home     Hip pain     S/P hip replacement     H/O: hysterectomy     Hx of ectopic pregnancy     Arm fracture     S/P revision of total hip     Hypothyroidism     DJD (degenerative joint disease), lumbar     Chronic pain syndrome     Do you have someone at home who is helping take care of you while you are sick?  YES  Who is helping you out at home?  who also tested positive.  Is anyone else at home feeling sick?  YES  If yes, how many others are sick and whom? 1,  who tested positive. Has nausea.  Are you staying home and social distancing? YES  Do you have a thermometer? YES  Do you have a pulse oximeter at home?  NO  Do you have tylenol/acetaminophen/paracetamol at home to take for fever?  YES  Do you have a family member who is English-speaking and has access to a smart phone? YES    Do you feel you know what symptoms to look for with COVID infection? YES     If you have symptoms, please stay home and avoid contact with other people as much as possible.  Wear a mask when you are outside of your home or around other people.  Use tylenol/paracetamol if you are having fevers, chills, or body aches.   Make sure to drink lots of fluids.  Honey can help with the cough.   If you develop shortness of breath please call your clinic to discuss your symptoms. If you develop severe shortness of breath with short walks, have difficulty speaking in full sentences, or are unable to drink enough fluids to care for yourself, then please dial 911, and go to the Emergency Room for evaluation.    Clinic phone numbers:    [facility]:  [phone]  [facility]:  [phone]  [facility]:  [phone]  [facility]:  [phone]     Do you have any other questions for me?   Patient is wondering when to be worried about shortness of breath. Patient was advised that if the SOB limits her activity, prevents her from walking short distances, or prevents her from speaking in full sentences, then she should go to the emergency room. Patient says the SOB does not limit activity and that it feels more like chest tightness. When asked if she would like to make an appointment, the patient declined; patient was told that we will check in on her symptoms tomorrow.    Ms. Chang was also wondering when she can end quarantine. She was advised that it must be...  - 10 days since symptom onset AND  - No fever or significant symptoms for 24 hrs without the use of NSAIDs    Ms. Chang asked if her granddaughter (who tested positive with her on the 18th) could go to school this next Monday. Her granddaughter has been asymptomatic for the entire duration thus far. She was advised on the criterion above, and advised that she should monitor her granddaughter's temperature closely for at least 24 hrs before going to school, and that if her granddaughter develops symptoms at any point, to abstain from going to school and continue quarantine.    Are you okay if we call and check in on how you are feeling again tomorrow? YES     I hope you stay safe and well.  We look forward to checking with you again tomorrow.      Chris Swenson    I discussed this patient with Dr. Philip Whalen

## 2020-09-01 NOTE — TELEPHONE ENCOUNTER
Follow-up call made to patient with COVID-19 diagnosis or PUI    Patient name: Kendra Chang  Date of follow-up: 09/01/20   Time of follow-up:  9:41 AM  Language:  English  PCP:  [facility]  Outreach Caller Name: Ирина Solis  Day of Symptoms #15    Symptoms Question to patients Patient response   Breathing Over the last 24 hours, have your breathing issues improved, stayed the same, or worsened? My breathing issues have improved   Fever Over the last 24 hours, has your fever improved, stayed the same, or worsened? I don't have a fever    Intake Over the last 24 hours, have you been able to eat and drink? Yes, somewhat decreased intake but still able to keep down some food and fluids     Cognition Over the last 24 hours, have you or your family noticed any confusion or changes in your function? no     Other notes: Patient notes that the chest tightness she has been experiencing has improved but still is present. Overall she feels that she is doing better. Appetite is still decreased but she is able to drink liquids and keeps food down when she does eat. She is wondering how long she will feel sick for and was advised that it is different for everyone but should notice improvements in symptoms.     Would you like to continue to receive check in calls from us? YES    Is anyone else in your family showing symptoms of COVID? NO  Would it be good for us to check in with them as well? NO    Plan: Continue intermittent follow-up calls to patient.    Ирина Solis

## 2020-09-02 NOTE — TELEPHONE ENCOUNTER
Follow-up call made to patient with COVID-19 diagnosis or PUI    Patient name: Kendra Chang  Date of follow-up: 09/02/20   Time of follow-up:  2:03 PM  Language:  English  PCP:  Elfego Tom  Outreach Caller Name: Samra Quiroz  Day of Symptoms #16    Symptoms Question to patients Patient response   Breathing Over the last 24 hours, have your breathing issues improved, stayed the same, or worsened? Stayed the same. Can sleep laying flat now. Still has tightness in chest, small cough and sore throat, and SOB when active.   Fever Over the last 24 hours, has your fever improved, stayed the same, or worsened? Improved. Temperature this morning was 99F. Is taking Tylenol (800mg/day).   Intake Over the last 24 hours, have you been able to eat and drink? Still can't taste or smell. Had one meal today, consistent fluid intake. Nausea persistent.      Cognition Over the last 24 hours, have you or your family noticed any confusion or changes in your function?  No     Other notes: Patient has been having headaches, which pounds more when she bends over. Both granddaughter and , who live with her, have tested positive for Covid.  has had resolution of symptoms for >2 weeks. Granddaughter is asymptomatic for >10 days since positive Covid test, and is attending in-person school (no virtual option).     {If symptoms are improving}  Would you like to continue to receive check in calls from us? YES    Is anyone else in your family showing symptoms of COVID?  no  Would it be good for us to check in with them as well?  no    Plan: Continue intermittent follow-up calls to patient.    Samra Quiroz    I discussed this patient with Ariella Mcmillan MD.

## 2020-09-03 NOTE — TELEPHONE ENCOUNTER
Follow-up call made to patient with COVID-19 diagnosis or PUI    Patient name: Kendra Chang  Date of follow-up: 09/03/20   Time of follow-up:  10:40 AM  Language:  English  PCP:  [facility] Tom  Outreach Caller Name: Ab Gregory  Day of Symptoms #17    Symptoms Question to patients Patient response   Breathing Over the last 24 hours, have your breathing issues improved, stayed the same, or worsened? My breathing issues have stayed the same Can lay flat without issue but has occasional mild chest tightness both at rest and with exertion.    Fever Over the last 24 hours, has your fever improved, stayed the same, or worsened? My fever has improved She has not had a fever today and has not taken any Tylenol.   Intake Over the last 24 hours, have you been able to eat and drink?  Yes, somewhat decreased intake but still able to keep down some food and fluids Anosmia persists, appetite is decreased but she is able to keep both liquids and solids down.     Cognition Over the last 24 hours, have you or your family noticed any confusion or changes in your function? no     Other notes: The patient is feeling well today and continues to improve daily. Her family is also healthy and she does not have any concerns today. She feels comfortable and well enough to forgo further telephone visits.      Would you like to continue to receive check in calls from us?  No    Is anyone else in your family showing symptoms of COVID? NO  Would it be good for us to check in with them as well? NO    Plan: Symptoms resolved. Remove from follow-up loop.    Ab Gregory    I discussed this patient with China Harris MD.

## 2020-09-08 ENCOUNTER — VIRTUAL VISIT (OUTPATIENT)
Dept: FAMILY MEDICINE | Facility: CLINIC | Age: 55
End: 2020-09-08
Payer: COMMERCIAL

## 2020-09-08 VITALS — BODY MASS INDEX: 24.91 KG/M2 | WEIGHT: 155 LBS | HEIGHT: 66 IN

## 2020-09-08 DIAGNOSIS — G93.31 POST VIRAL SYNDROME: Primary | ICD-10-CM

## 2020-09-08 RX ORDER — OXYCODONE AND ACETAMINOPHEN 10; 325 MG/1; MG/1
1 TABLET ORAL EVERY 6 HOURS PRN
COMMUNITY
End: 2021-03-24

## 2020-09-08 ASSESSMENT — MIFFLIN-ST. JEOR: SCORE: 1304.83

## 2020-09-08 NOTE — PROGRESS NOTES
Preceptor Attestation:   I talked to the patient on the phone. I discussed the patient with the resident. I have verified the content of the note, which accurately reflects my assessment of the patient and the plan of care.   Supervising Physician:  Tre Nguyen MD.

## 2020-09-08 NOTE — PROGRESS NOTES
"Family Medicine Telephone Visit Note         Telephone Visit Consent   Patient was verbally read the following and verbal consent was obtained.    \"Telephone visits are billed at different rates depending on your insurance coverage. During this emergency period, for some insurers they may be billed the same as an in-person visit.  Please reach out to your insurance provider with any questions.  If during the course of the call the physician/provider feels a telephone visit is not appropriate, you will not be charged for this service.\"    Name person giving consent:  Patient   Date verbal consent given:  9/8/2020  Time verbal consent given:  10:42 AM       Chief Complaint   Patient presents with     Shortness of Breath     has some chest tightness, it happened  early in the morning it seems like it wont go away, it felt like she was haivng a heart attack, she has had A Fib, has a stomach ache and throat was tight             HPI   Patients name: Gwendolyn  Appointment start time:  10:50 AM    CC: chest pain    Patient is having chest tightness, stomach ache, and throat tightness. She says chest tightness has been ongoing since she was diagnosed with COVID a few weeks ago. It is located in the center of her chest. Seems to be a little worse than when it first onset, but has been improving over the last few days. Patient had chest symptoms with atrial fibrillation a few years ago but states that it did not feel like this at all. She says it just occurred to her that she could be having a heart attack and wants to make sure this is not the case.    Throat tightness is new for the patient starting yesterday. It felt like it was hard for her to take a deep breath, but it is now improving and no longer very bothersome for her. She did not use any medication for this. No history of lung disease, patient also had few respiratory symptoms with COVID infection.    Current Outpatient Medications   Medication Sig Dispense Refill     " "Buprenorphine HCl (BELBUCA) 300 MCG FILM buccal film Place 300 mcg inside cheek every 12 hours       Cholecalciferol (VITAMIN D) 400 UNITS capsule Take 1 capsule by mouth daily.       IBUPROFEN PO        levothyroxine (SYNTHROID/LEVOTHROID) 50 MCG tablet Take 1 tablet (50 mcg) by mouth daily 90 tablet 0     lisinopril (ZESTRIL) 20 MG tablet Take 1 tablet (20 mg) by mouth daily 90 tablet 0     Multiple Vitamin (MULTI-VITAMIN PO) Take  by mouth.       omeprazole (PRILOSEC) 40 MG DR capsule Take 1 capsule (40 mg) by mouth daily 90 capsule 1     oxyCODONE-acetaminophen (PERCOCET)  MG per tablet Take 1 tablet by mouth every 6 hours as needed for severe pain       ASPIRIN PO Take 81 mg by mouth       CELECOXIB PO Take 200 mg by mouth       OMEPRAZOLE PO        oxybutynin ER (DITROPAN-XL) 5 MG 24 hr tablet Take 1 tablet (5 mg) by mouth daily (Patient not taking: Reported on 1/15/2020) 90 tablet 1     OXYCODONE HCL PO        Allergies   Allergen Reactions     Novocain [Procaine Hcl] Shortness Of Breath     Compazine      Feels like jaw is going out of socket     Latex Hives and Itching     Metoclopramide      \"dystonic reaction\"      Morphine Hcl Hives     Penicillins      Phenothiazines      \"dystonic reaction\"      Procaine      Pt denies allergy. Had tachycardia in dental office after local injecion, likely due to epinephrine.     Reglan Other (See Comments)     Joint issue with jaw, feels painful            Review of Systems:     General: no fevers/chills  ENT: tight throat. No sore throat  Resp: no cough, SOB  CV: chest tightness, no palpitations  GI: mild nausea  MSK: no arthralgias/myalgias. No leg swelling or tenderness  Neuro: no headache         Physical Exam:     Ht 1.676 m (5' 6\")   Wt 70.3 kg (155 lb)   BMI 25.02 kg/m    Estimated body mass index is 25.02 kg/m  as calculated from the following:    Height as of this encounter: 1.676 m (5' 6\").    Weight as of this encounter: 70.3 kg (155 lb).    No exam " for telephone visit  Psychiatric: mentation appears normal and affect normal/bright        Assessment and Plan   (G93.3) Post viral syndrome  (primary encounter diagnosis)  Comment: Patient with ongoing symptoms for about the last 20 days of chest tightness. No significant cardiac risk factors. Patient does not meet ICSI guidelines for ACS workup at this time. No leg swelling, cough, pleuritic chest pain that raise concern for PE. In COVID patient, do have high concern for hypercoagulability. I provided reassurance at this time. If the patient's symptoms either worsen or do not continue to improve by the end of the week, she will present to clinic for in person evaluation and collection of vital signs.      Refilled medications that would be required in the next 3 months.     After Visit Information:  Patient declined AVS     No follow-ups on file.    Appointment end time: 11:02 AM  This is a telephone visit that took 12 minutes.      Clinician location:  [place]    Stefan Herrmann MD  I precepted today with Dr. Sharif

## 2020-10-18 DIAGNOSIS — E03.9 HYPOTHYROIDISM, UNSPECIFIED TYPE: ICD-10-CM

## 2020-10-21 RX ORDER — LEVOTHYROXINE SODIUM 50 UG/1
50 TABLET ORAL DAILY
Qty: 90 TABLET | Refills: 0 | Status: SHIPPED | OUTPATIENT
Start: 2020-10-21 | End: 2021-02-05

## 2020-11-23 ENCOUNTER — ANCILLARY PROCEDURE (OUTPATIENT)
Dept: GENERAL RADIOLOGY | Facility: CLINIC | Age: 55
End: 2020-11-23
Payer: COMMERCIAL

## 2020-11-23 ENCOUNTER — OFFICE VISIT (OUTPATIENT)
Dept: FAMILY MEDICINE | Facility: CLINIC | Age: 55
End: 2020-11-23
Payer: COMMERCIAL

## 2020-11-23 VITALS
HEART RATE: 61 BPM | OXYGEN SATURATION: 100 % | RESPIRATION RATE: 20 BRPM | DIASTOLIC BLOOD PRESSURE: 70 MMHG | BODY MASS INDEX: 25.76 KG/M2 | WEIGHT: 159.6 LBS | TEMPERATURE: 98.4 F | SYSTOLIC BLOOD PRESSURE: 110 MMHG

## 2020-11-23 DIAGNOSIS — M25.532 LEFT WRIST PAIN: ICD-10-CM

## 2020-11-23 DIAGNOSIS — S63.502A WRIST SPRAIN, LEFT, INITIAL ENCOUNTER: Primary | ICD-10-CM

## 2020-11-23 PROCEDURE — 99213 OFFICE O/P EST LOW 20 MIN: CPT | Mod: GC | Performed by: STUDENT IN AN ORGANIZED HEALTH CARE EDUCATION/TRAINING PROGRAM

## 2020-11-23 PROCEDURE — 73110 X-RAY EXAM OF WRIST: CPT | Mod: LT | Performed by: RADIOLOGY

## 2020-11-23 ASSESSMENT — PAIN SCALES - GENERAL: PAINLEVEL: MODERATE PAIN (5)

## 2020-11-23 NOTE — PROGRESS NOTES
Preceptor attestation:  Vital signs reviewed: /70   Pulse 61   Temp 98.4  F (36.9  C) (Oral)   Resp 20   Wt 72.4 kg (159 lb 9.6 oz)   SpO2 100%   BMI 25.76 kg/m      Patient seen, evaluated, and discussed with the resident.  I have verified the content of the note, which accurately reflects my assessment of the patient and the plan of care.    I reviewed the wrist x-ray, and I agree with Dr. Caicedo's interpretation.    Supervising physician: Candy Adams MD  [facility]

## 2020-11-23 NOTE — PROGRESS NOTES
There are no exam notes on file for this visit.  Chief Complaint   Patient presents with     Fall     fell 3 days ago, left wrist pain from fall     Blood pressure 110/70, pulse 61, temperature 98.4  F (36.9  C), temperature source Oral, resp. rate 20, weight 72.4 kg (159 lb 9.6 oz), SpO2 100 %, not currently breastfeeding.           SUBJECTIVE       Gwendolyn is a 55 year old  female with a PMH significant for:     Patient Active Problem List   Diagnosis     Other atopic dermatitis and related conditions     Contact dermatitis and other eczema, due to unspecified cause     Rosacea     Health Care Home     Hip pain     S/P hip replacement     H/O: hysterectomy     Hx of ectopic pregnancy     Arm fracture     S/P revision of total hip     Hypothyroidism     DJD (degenerative joint disease), lumbar     Chronic pain syndrome     She presents with left wrist pain and tenderness after mechanical fall on 11/20/2020.  Patient was walking down her driveway on a snowy day slipped and fell landing on her left arm.  Denies any mechanical trauma to her head.  Since then has had constant left wrist pain, moderate swelling, stiffness with range of motion.  Has a past medical history of chronic pain, current pain regimen includes buprenorphine and oxycodone.  Denies any numbness, tingling in her fingers, denies any skin changes, denies any extensive bruising.    Of note patient tested positive for COVID-19 in early August, has since recovered.  Sees a pain specialist who is managing her controlled substances.  Declines vaccinations.  Denies any other acute complaints other than left wrist pain.    PMH, Medications and Allergies were reviewed and updated as needed.        REVIEW OF SYSTEMS     Constitutional, cardiovascular, pulmonary, GI, musculoskeletal, neuro, skin, and psych systems are negative, except as otherwise noted.          OBJECTIVE     Vitals:    11/23/20 1100   BP: 110/70   Pulse: 61   Resp: 20   Temp: 98.4  F (36.9  C)    TempSrc: Oral   SpO2: 100%   Weight: 72.4 kg (159 lb 9.6 oz)     Body mass index is 25.76 kg/m .    Constitutional: Awake, alert, cooperative, no acute distress, and appears stated age.  Eyes: sclera clear, conjunctiva normal.  ENT: NC/AT, MMM  Neck: Supple, symmetrical, trachea midline  Back: Symmetric, no curvature  Lungs: No increased WOB, CTABL, no crackles or wheezing appreciated.  Cardiovascular: RRR, normal S1 and S2, no S3 or S4, and no murmur appreciated.  Abdomen: Normal BS, soft, non-distended, non-tender, no masses palpated  Musculoskeletal: Left wrist tenderness to palpation and range of motion, tenderness to palpation over the medial wrist/snuff box, capillary refill < 2 second, no sensation loss.  Neurologic: A&Ox3.  Cranial nerves II-XII are grossly intact.  Sensory is intact and gait is normal.  Neuropsychiatric: Normal affect, mood, orientation, memory and insight.  Skin: No visible rashes, erythema, pallor, petechia or purpura.    No flowsheet data found.  PHQ-9 SCORE 7/27/2015 9/29/2016 2/12/2019   PHQ-9 Total Score 1 - -   PHQ-9 Total Score - 1 0     No results found for any visits on 11/23/20.    ASSESSMENT AND PLAN     Kendra was seen today for fall.    Diagnoses and all orders for this visit:    Wrist sprain, left, initial encounter  -     Cancel: Wrist/Arm/Hand Supplies Order for DME - ONLY FOR DME  -     Wrist/Arm/Hand Supplies Order    Left wrist pain  -     Cancel: XR Wrist Left G/E 3 Views  -     XR Wrist Left G/E 3 Views; Future  -     Cancel: Wrist/Arm/Hand Supplies Order for DME - ONLY FOR DME      Given history of mechanical injury to left arm, lingering left wrist pain despite current opioid regimen will obtain three-view left wrist.  Review image with patient, low suspicion for wrist fracture or scaphoid fracture given normal appearance of wrist. Wrist brace for left wrist sprain given. Return to clinic if symptoms worsen or do not improve in 1 week. Consider more extensive  imaging at that time if needed. Patient declined vaccination at this time.    Return for Routine preventive, in person.    Florin Caicedo MD  11/23/2020    Precepted with Dr. Adams.    DME (Durable Medical Equipment) Orders and Documentation  Orders Placed This Encounter   Procedures     Wrist/Arm/Hand Supplies Order      The patient was assessed and it was determined the patient is in need of the following listed DME Supplies/Equipment. Please complete supporting documentation below to demonstrate medical necessity.      Wrist/Arm/Hand Bracing Supplies Documentation  Patient requires the use of the ordered bracing device due to following medical need/condition:

## 2021-01-08 DIAGNOSIS — I10 ESSENTIAL HYPERTENSION: ICD-10-CM

## 2021-01-11 RX ORDER — LISINOPRIL 20 MG/1
TABLET ORAL
Qty: 90 TABLET | Refills: 0 | Status: SHIPPED | OUTPATIENT
Start: 2021-01-11 | End: 2021-06-23

## 2021-01-15 ENCOUNTER — HEALTH MAINTENANCE LETTER (OUTPATIENT)
Age: 56
End: 2021-01-15

## 2021-02-04 DIAGNOSIS — E03.9 HYPOTHYROIDISM, UNSPECIFIED TYPE: ICD-10-CM

## 2021-02-05 RX ORDER — LEVOTHYROXINE SODIUM 50 UG/1
50 TABLET ORAL DAILY
Qty: 90 TABLET | Refills: 0 | Status: SHIPPED | OUTPATIENT
Start: 2021-02-05 | End: 2021-05-07

## 2021-02-08 ENCOUNTER — TRANSFERRED RECORDS (OUTPATIENT)
Dept: HEALTH INFORMATION MANAGEMENT | Facility: CLINIC | Age: 56
End: 2021-02-08

## 2021-02-12 ENCOUNTER — TRANSFERRED RECORDS (OUTPATIENT)
Dept: HEALTH INFORMATION MANAGEMENT | Facility: CLINIC | Age: 56
End: 2021-02-12

## 2021-02-17 ENCOUNTER — TRANSFERRED RECORDS (OUTPATIENT)
Dept: HEALTH INFORMATION MANAGEMENT | Facility: CLINIC | Age: 56
End: 2021-02-17

## 2021-02-26 DIAGNOSIS — K21.9 GASTROESOPHAGEAL REFLUX DISEASE, UNSPECIFIED WHETHER ESOPHAGITIS PRESENT: ICD-10-CM

## 2021-03-01 RX ORDER — OMEPRAZOLE 40 MG/1
40 CAPSULE, DELAYED RELEASE ORAL DAILY
Qty: 90 CAPSULE | Refills: 0 | Status: SHIPPED | OUTPATIENT
Start: 2021-03-01 | End: 2021-06-01

## 2021-03-02 ENCOUNTER — TRANSFERRED RECORDS (OUTPATIENT)
Dept: HEALTH INFORMATION MANAGEMENT | Facility: CLINIC | Age: 56
End: 2021-03-02

## 2021-03-10 ENCOUNTER — TRANSFERRED RECORDS (OUTPATIENT)
Dept: HEALTH INFORMATION MANAGEMENT | Facility: CLINIC | Age: 56
End: 2021-03-10

## 2021-03-24 ENCOUNTER — OFFICE VISIT (OUTPATIENT)
Dept: FAMILY MEDICINE | Facility: CLINIC | Age: 56
End: 2021-03-24
Payer: COMMERCIAL

## 2021-03-24 VITALS
RESPIRATION RATE: 14 BRPM | SYSTOLIC BLOOD PRESSURE: 115 MMHG | BODY MASS INDEX: 25.02 KG/M2 | TEMPERATURE: 98.9 F | HEART RATE: 66 BPM | DIASTOLIC BLOOD PRESSURE: 75 MMHG | OXYGEN SATURATION: 97 % | WEIGHT: 155 LBS

## 2021-03-24 DIAGNOSIS — N39.41 URGE INCONTINENCE OF URINE: ICD-10-CM

## 2021-03-24 DIAGNOSIS — M81.6 LOCALIZED OSTEOPOROSIS WITHOUT CURRENT PATHOLOGICAL FRACTURE: Primary | ICD-10-CM

## 2021-03-24 PROCEDURE — 99214 OFFICE O/P EST MOD 30 MIN: CPT | Performed by: FAMILY MEDICINE

## 2021-03-24 RX ORDER — ALENDRONATE SODIUM 70 MG/1
70 TABLET ORAL
Qty: 30 TABLET | Refills: 4 | Status: SHIPPED | OUTPATIENT
Start: 2021-03-24 | End: 2022-04-05

## 2021-03-24 RX ORDER — OXYCODONE AND ACETAMINOPHEN 10; 325 MG/1; MG/1
1 TABLET ORAL EVERY 6 HOURS PRN
COMMUNITY

## 2021-03-24 NOTE — PATIENT INSTRUCTIONS
fosamax for osteoporosis of lower back and osteopenia of left hip   Ca/vitamin   Urology referral done   not due for pheumovac   get shingles in pharmacy

## 2021-03-30 ENCOUNTER — TRANSFERRED RECORDS (OUTPATIENT)
Dept: HEALTH INFORMATION MANAGEMENT | Facility: CLINIC | Age: 56
End: 2021-03-30

## 2021-04-09 NOTE — PATIENT INSTRUCTIONS
Stop at the referral desk to schedule an appointment for a mammogram.      Follow up in 1 month.    Thank you for coming to [facility].  **If you had lab testing today and your results are reassuring or normal they will be be mailed to you within 7 days.   **If the lab tests need quick action we will call you with the results.  The phone number we will call with results is # [phone] (home) [phone] (work). If this is not the best number please call our clinic and change the number.  If you need any refills please call your pharmacy and they will contact us.  If you have any concerns about today's visit or wish to schedule another appointment please call our office during normal business hours [phone] (8-5:00 M-F)  If you have urgent medical concerns please call [phone] at any time of the day.  If you a medical emergency please call [phone]  Again thank you for choosing [facility] and please let us know how we can best partner with you to improve you and your family's health.    MAMMOGRAM REFERRAL  [facility]  Radiology Department 1st floor  [address] [address]  [phone]  Date:  Tuesday February 7, 2017  Time:  8:30 AM  No lotion, powder, deodorant or perfume under thw arms and around the breast area.  Given to patient.  Maritza FAULKNER Pack  2/2/17   192

## 2021-04-13 ENCOUNTER — TRANSFERRED RECORDS (OUTPATIENT)
Dept: HEALTH INFORMATION MANAGEMENT | Facility: CLINIC | Age: 56
End: 2021-04-13

## 2021-04-16 ENCOUNTER — VIRTUAL VISIT (OUTPATIENT)
Dept: UROLOGY | Facility: CLINIC | Age: 56
End: 2021-04-16
Payer: COMMERCIAL

## 2021-04-16 VITALS — WEIGHT: 160 LBS | HEIGHT: 66 IN | BODY MASS INDEX: 25.71 KG/M2

## 2021-04-16 DIAGNOSIS — N39.41 URGE INCONTINENCE OF URINE: ICD-10-CM

## 2021-04-16 PROCEDURE — 99203 OFFICE O/P NEW LOW 30 MIN: CPT | Mod: 95 | Performed by: PHYSICIAN ASSISTANT

## 2021-04-16 RX ORDER — MIRABEGRON 25 MG/1
25 TABLET, FILM COATED, EXTENDED RELEASE ORAL DAILY
Qty: 30 TABLET | Refills: 3 | Status: SHIPPED | OUTPATIENT
Start: 2021-04-16 | End: 2021-09-07

## 2021-04-16 RX ORDER — ESTRADIOL 0.1 MG/G
CREAM VAGINAL
Qty: 42.5 G | Refills: 3 | Status: SHIPPED | OUTPATIENT
Start: 2021-04-16 | End: 2024-09-26

## 2021-04-16 ASSESSMENT — MIFFLIN-ST. JEOR: SCORE: 1327.51

## 2021-04-16 ASSESSMENT — PAIN SCALES - GENERAL: PAINLEVEL: NO PAIN (0)

## 2021-04-16 NOTE — LETTER
4/16/2021       RE: Kendra Chang  [address] [place] [address]     Dear Colleague,    Thank you for referring your patient, Kendra Chang, to the [facility] [place] at [facility]. Please see a copy of my visit note below.    *SEND LINK TO CELL PHONE*    PT STATES IF SHE DOESN'T MAKE IT TO THE BATHROOM IN TIME SHE LEAKS.  PT DENIES DYSURIA OR GROSS HEM.  PT WEARS PADS AND GOES THRU 3-5 PADS A DAY.  PT STOPPED OXYBUTYNIN ABOUT A YEAR AGO AND STATES IT DIDN'T WORK.    Gwendolyn is a 55 year old who is being evaluated via a billable video visit.      How would you like to obtain your AVS? MyChart  If the video visit is dropped, the invitation should be resent by: Text to cell phone: [phone]  Will anyone else be joining your video visit? No      Video Start Time: 10:34 AM  Video-Visit Details    Type of service:  Video Visit    Video End Time:10:42 AM    Originating Location (pt. Location): Home    Distant Location (provider location):  [facility] [place]     Platform used for Video Visit: [employer]      CC: urgency    HPI:  Kendra Chang is a pleasant 55 year old female who presents in consultation from Dr. Tom for evaluation of the above.     Has worsening of symptoms including urgency, frequency and nocturia this past year. Has tried oxybutynin without improvement.  Can struggle with constipation. No dysuria or hematuria.     Past Medical History:   Diagnosis Date     Arthritis     dysplagia right hip     Eczema     hands     Gastro-oesophageal reflux disease      GERD (gastroesophageal reflux disease)      History of blood transfusion      Mumps      Palpitations      STD (sexually transmitted disease)      Thyroid disease        Past Surgical History:   Procedure Laterality Date     APPENDECTOMY       ARTHROPLASTY REVISION HIP  5/21/2013    Procedure: ARTHROPLASTY REVISION HIP;  Right Total Hip  Arthroplasty Revision   LATEX ALLERGY;  Surgeon: Fran Panda MD;  [facility]: [facility]     CHOLECYSTECTOMY       ENT SURGERY      tonsillectomy     GENITOURINARY SURGERY      3 tubal pregnancies     GYN SURGERY       HYSTERECTOMY       ORTHOPEDIC SURGERY      Right total hip () & revision () & hardware removal ()     ORTHOPEDIC SURGERY      Right elbow surgery x 3       Social History     Socioeconomic History     Marital status:      Spouse name: Not on file     Number of children: Not on file     Years of education: Not on file     Highest education level: Not on file   Occupational History     Not on file   Social Needs     Financial resource strain: Not on file     Food insecurity     Worry: Not on file     Inability: Not on file     Transportation needs     Medical: Not on file     Non-medical: Not on file   Tobacco Use     Smoking status: Former Smoker     Packs/day: 0.00     Quit date: 2/15/1985     Years since quittin.1     Smokeless tobacco: Never Used   Substance and Sexual Activity     Alcohol use: No     Drug use: No     Sexual activity: Not Currently     Birth control/protection: Abstinence   Lifestyle     Physical activity     Days per week: Not on file     Minutes per session: Not on file     Stress: Not on file   Relationships     Social connections     Talks on phone: Not on file     Gets together: Not on file     Attends Zoroastrian service: Not on file     Active member of club or organization: Not on file     Attends meetings of clubs or organizations: Not on file     Relationship status: Not on file     Intimate partner violence     Fear of current or ex partner: Not on file     Emotionally abused: Not on file     Physically abused: Not on file     Forced sexual activity: Not on file   Other Topics Concern     Parent/sibling w/ CABG, MI or angioplasty before 65F 55M? Not Asked   Social History Narrative     Not on file       Family History   Problem Relation Age of Onset      "Coronary Artery Disease Mother      Cancer Mother      Diabetes Father      Coronary Artery Disease Father      Cancer Father      Heart Disease Father        ROS:14 point ROS neg other than the symptoms noted above in the HPI.    Allergies   Allergen Reactions     Novocain [Procaine Hcl] Shortness Of Breath     Compazine      Feels like jaw is going out of socket     Latex Hives and Itching     Metoclopramide      \"dystonic reaction\"      Morphine Hcl Hives     Penicillins      Phenothiazines      \"dystonic reaction\"      Procaine      Pt denies allergy. Had tachycardia in dental office after local injecion, likely due to epinephrine.     Reglan Other (See Comments)     Joint issue with jaw, feels painful       Current Outpatient Medications   Medication     alendronate (FOSAMAX) 70 MG tablet     Buprenorphine HCl (BELBUCA) 300 MCG FILM buccal film     Cholecalciferol (VITAMIN D) 400 UNITS capsule     IBUPROFEN PO     levothyroxine (SYNTHROID/LEVOTHROID) 50 MCG tablet     lisinopril (ZESTRIL) 20 MG tablet     Multiple Vitamin (MULTI-VITAMIN PO)     omeprazole (PRILOSEC) 40 MG DR capsule     OMEPRAZOLE PO     oxyCODONE-acetaminophen (PERCOCET)  MG per tablet     oxybutynin ER (DITROPAN-XL) 5 MG 24 hr tablet     No current facility-administered medications for this visit.          PEx:   Height 1.676 m (5' 6\"), weight 72.6 kg (160 lb), not currently breastfeeding.    PSYCH: NAD  NEURO: AAO x3    Urine: neg      A/P: Kendra Chang is a 55 year old female with urge incontinence  -Myrbetriq 25 mg daily, SE reviewed  -Estrogen cream three times a week near urethra (pea-sized amount): If >$40, she will let me know and we can get via a compound pharmacy.  -Med check 3 months    [facility]    "

## 2021-04-16 NOTE — PATIENT INSTRUCTIONS
Below is a list of things that can irritate the bladder and should be eliminated:      Caffeinated soft drinks.    Coffee.    Tea.    Chocolate.    Tomato-based foods.    Acidic juices and fruits. (includes cranberry juice)    Alcohol.    Carbonated drinks.    Aspartame/Nutrasweet.    Estrogen cream three times a week near urethra (pea-sized amount): If >$40, let me know and we can get via a compound pharmacy.    You have been prescribed medication to help relax your bladder (Myrbetriq or mirabegron).    This medication may help control (or improve) urinary frequency, urgency and urge incontinence.    Common side effects include:  Dry mouth (Biotene mouthwash can help with this.)  Constipation  Drowsiness  Blurred vision    Rare side effect:  Urinary retention

## 2021-04-16 NOTE — PROGRESS NOTES
*SEND LINK TO CELL PHONE*    PT STATES IF SHE DOESN'T MAKE IT TO THE BATHROOM IN TIME SHE LEAKS.  PT DENIES DYSURIA OR GROSS HEM.  PT WEARS PADS AND GOES THRU 3-5 PADS A DAY.  PT STOPPED OXYBUTYNIN ABOUT A YEAR AGO AND STATES IT DIDN'T WORK.    Gwendolyn is a 55 year old who is being evaluated via a billable video visit.      How would you like to obtain your AVS? MyChart  If the video visit is dropped, the invitation should be resent by: Text to cell phone: [phone]  Will anyone else be joining your video visit? No      Video Start Time: 10:34 AM  Video-Visit Details    Type of service:  Video Visit    Video End Time:10:42 AM    Originating Location (pt. Location): Home    Distant Location (provider location):  [facility] [place]     Platform used for Video Visit: [employer]      CC: urgency    HPI:  Kendra Chang is a pleasant 55 year old female who presents in consultation from Dr. Tom for evaluation of the above.     Has worsening of symptoms including urgency, frequency and nocturia this past year. Has tried oxybutynin without improvement.  Can struggle with constipation. No dysuria or hematuria.     Past Medical History:   Diagnosis Date     Arthritis     dysplagia right hip     Eczema     hands     Gastro-oesophageal reflux disease      GERD (gastroesophageal reflux disease)      History of blood transfusion      Mumps      Palpitations      STD (sexually transmitted disease)      Thyroid disease        Past Surgical History:   Procedure Laterality Date     APPENDECTOMY       ARTHROPLASTY REVISION HIP  5/21/2013    Procedure: ARTHROPLASTY REVISION HIP;  Right Total Hip Arthroplasty Revision   LATEX ALLERGY;  Surgeon: Fran Panda MD;  [facility]: [facility]     CHOLECYSTECTOMY       ENT SURGERY      tonsillectomy     GENITOURINARY SURGERY      3 tubal pregnancies     GYN SURGERY       HYSTERECTOMY       ORTHOPEDIC SURGERY      Right total hip ('98) & revision ('11) & hardware removal ('11)      ORTHOPEDIC SURGERY      Right elbow surgery x 3       Social History     Socioeconomic History     Marital status:      Spouse name: Not on file     Number of children: Not on file     Years of education: Not on file     Highest education level: Not on file   Occupational History     Not on file   Social Needs     Financial resource strain: Not on file     Food insecurity     Worry: Not on file     Inability: Not on file     Transportation needs     Medical: Not on file     Non-medical: Not on file   Tobacco Use     Smoking status: Former Smoker     Packs/day: 0.00     Quit date: 2/15/1985     Years since quittin.1     Smokeless tobacco: Never Used   Substance and Sexual Activity     Alcohol use: No     Drug use: No     Sexual activity: Not Currently     Birth control/protection: Abstinence   Lifestyle     Physical activity     Days per week: Not on file     Minutes per session: Not on file     Stress: Not on file   Relationships     Social connections     Talks on phone: Not on file     Gets together: Not on file     Attends Scientology service: Not on file     Active member of club or organization: Not on file     Attends meetings of clubs or organizations: Not on file     Relationship status: Not on file     Intimate partner violence     Fear of current or ex partner: Not on file     Emotionally abused: Not on file     Physically abused: Not on file     Forced sexual activity: Not on file   Other Topics Concern     Parent/sibling w/ CABG, MI or angioplasty before 65F 55M? Not Asked   Social History Narrative     Not on file       Family History   Problem Relation Age of Onset     Coronary Artery Disease Mother      Cancer Mother      Diabetes Father      Coronary Artery Disease Father      Cancer Father      Heart Disease Father        ROS:14 point ROS neg other than the symptoms noted above in the HPI.    Allergies   Allergen Reactions     Novocain [Procaine Hcl] Shortness Of Breath     Compazine   "    Feels like jaw is going out of socket     Latex Hives and Itching     Metoclopramide      \"dystonic reaction\"      Morphine Hcl Hives     Penicillins      Phenothiazines      \"dystonic reaction\"      Procaine      Pt denies allergy. Had tachycardia in dental office after local injecion, likely due to epinephrine.     Reglan Other (See Comments)     Joint issue with jaw, feels painful       Current Outpatient Medications   Medication     alendronate (FOSAMAX) 70 MG tablet     Buprenorphine HCl (BELBUCA) 300 MCG FILM buccal film     Cholecalciferol (VITAMIN D) 400 UNITS capsule     IBUPROFEN PO     levothyroxine (SYNTHROID/LEVOTHROID) 50 MCG tablet     lisinopril (ZESTRIL) 20 MG tablet     Multiple Vitamin (MULTI-VITAMIN PO)     omeprazole (PRILOSEC) 40 MG DR capsule     OMEPRAZOLE PO     oxyCODONE-acetaminophen (PERCOCET)  MG per tablet     oxybutynin ER (DITROPAN-XL) 5 MG 24 hr tablet     No current facility-administered medications for this visit.          PEx:   Height 1.676 m (5' 6\"), weight 72.6 kg (160 lb), not currently breastfeeding.    PSYCH: NAD  NEURO: AAO x3    Urine: neg      A/P: Kendra Chang is a 55 year old female with urge incontinence  -Myrbetriq 25 mg daily, SE reviewed  -Estrogen cream three times a week near urethra (pea-sized amount): If >$40, she will let me know and we can get via a compound pharmacy.  -Med check 3 months    [facility]                  "

## 2021-04-19 ENCOUNTER — TELEPHONE (OUTPATIENT)
Dept: UROLOGY | Facility: CLINIC | Age: 56
End: 2021-04-19

## 2021-04-19 NOTE — TELEPHONE ENCOUNTER
----- Message from Sarah Lawson sent at 4/19/2021  1:55 PM CDT -----  3 mon, med check, video    CRYSTAL

## 2021-04-28 ENCOUNTER — TRANSFERRED RECORDS (OUTPATIENT)
Dept: HEALTH INFORMATION MANAGEMENT | Facility: CLINIC | Age: 56
End: 2021-04-28

## 2021-05-04 ENCOUNTER — TRANSFERRED RECORDS (OUTPATIENT)
Dept: HEALTH INFORMATION MANAGEMENT | Facility: CLINIC | Age: 56
End: 2021-05-04

## 2021-05-04 ENCOUNTER — HOSPITAL ENCOUNTER (EMERGENCY)
Dept: EMERGENCY MEDICINE | Facility: CLINIC | Age: 56
Discharge: HOME | End: 2021-05-04
Attending: EMERGENCY MEDICINE
Payer: COMMERCIAL

## 2021-05-04 DIAGNOSIS — R51.9 ACUTE NONINTRACTABLE HEADACHE, UNSPECIFIED HEADACHE TYPE: ICD-10-CM

## 2021-05-04 LAB
ANION GAP SERPL CALCULATED.3IONS-SCNC: 8 MMOL/L (ref 5–18)
BUN SERPL-MCNC: 9 MG/DL (ref 8–22)
CALCIUM SERPL-MCNC: 8.7 MG/DL (ref 8.5–10.5)
CHLORIDE BLD-SCNC: 103 MMOL/L (ref 98–107)
CO2 SERPL-SCNC: 28 MMOL/L (ref 22–31)
CREAT SERPL-MCNC: 0.78 MG/DL (ref 0.6–1.1)
GFR SERPL CREATININE-BSD FRML MDRD: >60 ML/MIN/1.73M2
GLUCOSE BLD-MCNC: 77 MG/DL (ref 70–125)
POTASSIUM BLD-SCNC: 3.7 MMOL/L (ref 3.5–5)
SODIUM SERPL-SCNC: 139 MMOL/L (ref 136–145)

## 2021-05-04 ASSESSMENT — MIFFLIN-ST. JEOR: SCORE: 1327.51

## 2021-05-07 DIAGNOSIS — E03.9 HYPOTHYROIDISM, UNSPECIFIED TYPE: ICD-10-CM

## 2021-05-07 RX ORDER — LEVOTHYROXINE SODIUM 50 UG/1
50 TABLET ORAL DAILY
Qty: 90 TABLET | Refills: 0 | Status: SHIPPED | OUTPATIENT
Start: 2021-05-07 | End: 2021-08-09

## 2021-05-17 ENCOUNTER — TRANSFERRED RECORDS (OUTPATIENT)
Dept: HEALTH INFORMATION MANAGEMENT | Facility: CLINIC | Age: 56
End: 2021-05-17

## 2021-05-17 ENCOUNTER — RECORDS - HEALTHEAST (OUTPATIENT)
Dept: ADMINISTRATIVE | Facility: OTHER | Age: 56
End: 2021-05-17

## 2021-05-18 ENCOUNTER — RECORDS - HEALTHEAST (OUTPATIENT)
Dept: VASCULAR ULTRASOUND | Facility: CLINIC | Age: 56
End: 2021-05-18

## 2021-05-18 DIAGNOSIS — M25.561 PAIN IN RIGHT KNEE: ICD-10-CM

## 2021-05-18 DIAGNOSIS — R60.0 LOCALIZED EDEMA: ICD-10-CM

## 2021-05-18 DIAGNOSIS — S82.201A UNSPECIFIED FRACTURE OF SHAFT OF RIGHT TIBIA, INITIAL ENCOUNTER FOR CLOSED FRACTURE: ICD-10-CM

## 2021-05-25 ENCOUNTER — RECORDS - HEALTHEAST (OUTPATIENT)
Dept: ADMINISTRATIVE | Facility: CLINIC | Age: 56
End: 2021-05-25

## 2021-05-26 ENCOUNTER — RECORDS - HEALTHEAST (OUTPATIENT)
Dept: ADMINISTRATIVE | Facility: CLINIC | Age: 56
End: 2021-05-26

## 2021-05-27 ENCOUNTER — RECORDS - HEALTHEAST (OUTPATIENT)
Dept: ADMINISTRATIVE | Facility: CLINIC | Age: 56
End: 2021-05-27

## 2021-05-27 VITALS — HEIGHT: 66 IN | WEIGHT: 160 LBS | BODY MASS INDEX: 25.71 KG/M2

## 2021-05-31 DIAGNOSIS — K21.9 GASTROESOPHAGEAL REFLUX DISEASE, UNSPECIFIED WHETHER ESOPHAGITIS PRESENT: ICD-10-CM

## 2021-06-01 ENCOUNTER — RECORDS - HEALTHEAST (OUTPATIENT)
Dept: ADMINISTRATIVE | Facility: CLINIC | Age: 56
End: 2021-06-01

## 2021-06-01 ENCOUNTER — TRANSFERRED RECORDS (OUTPATIENT)
Dept: HEALTH INFORMATION MANAGEMENT | Facility: CLINIC | Age: 56
End: 2021-06-01

## 2021-06-01 VITALS — BODY MASS INDEX: 24.43 KG/M2 | HEIGHT: 65 IN | WEIGHT: 146.6 LBS

## 2021-06-01 RX ORDER — OMEPRAZOLE 40 MG/1
40 CAPSULE, DELAYED RELEASE ORAL DAILY
Qty: 90 CAPSULE | Refills: 0 | Status: SHIPPED | OUTPATIENT
Start: 2021-06-01 | End: 2021-08-23

## 2021-06-02 ENCOUNTER — RECORDS - HEALTHEAST (OUTPATIENT)
Dept: ADMINISTRATIVE | Facility: CLINIC | Age: 56
End: 2021-06-02

## 2021-06-10 ENCOUNTER — OFFICE VISIT (OUTPATIENT)
Dept: FAMILY MEDICINE | Facility: CLINIC | Age: 56
End: 2021-06-10
Payer: COMMERCIAL

## 2021-06-10 VITALS
DIASTOLIC BLOOD PRESSURE: 66 MMHG | SYSTOLIC BLOOD PRESSURE: 102 MMHG | HEART RATE: 58 BPM | RESPIRATION RATE: 16 BRPM | TEMPERATURE: 98.5 F | WEIGHT: 159 LBS | BODY MASS INDEX: 25.66 KG/M2 | OXYGEN SATURATION: 96 %

## 2021-06-10 DIAGNOSIS — T14.8XXA OPEN WOUND: Primary | ICD-10-CM

## 2021-06-10 PROCEDURE — 99213 OFFICE O/P EST LOW 20 MIN: CPT | Mod: GC | Performed by: STUDENT IN AN ORGANIZED HEALTH CARE EDUCATION/TRAINING PROGRAM

## 2021-06-10 RX ORDER — MUPIROCIN 20 MG/G
OINTMENT TOPICAL 3 TIMES DAILY
Qty: 30 G | Refills: 3 | Status: SHIPPED | OUTPATIENT
Start: 2021-06-10 | End: 2022-06-06

## 2021-06-10 NOTE — PROGRESS NOTES
Preceptor attestation:  Vital signs reviewed: /66   Pulse 58   Temp 98.5  F (36.9  C) (Oral)   Resp 16   Wt 72.1 kg (159 lb)   SpO2 96%   BMI 25.66 kg/m      Patient seen, evaluated, and discussed with the resident.  I have verified the content of the note, which accurately reflects my assessment of the patient and the plan of care.    Supervising physician: Candy Adams MD  [facility]

## 2021-06-10 NOTE — PROGRESS NOTES
SUBJECTIVE       Kendra Chang is a 55 year old  female with a PMH significant for:     Patient Active Problem List   Diagnosis     Other atopic dermatitis and related conditions     Contact dermatitis and other eczema, due to unspecified cause     Rosacea     Health Care Home     Hip pain     S/P hip replacement     H/O: hysterectomy     Hx of ectopic pregnancy     Arm fracture     S/P revision of total hip     Hypothyroidism     DJD (degenerative joint disease), lumbar     Chronic pain syndrome     Patient presents with:  Infection: poss infection on the left leg for 2 weeks now, started when wearing new shoe for a walk and got a blister     Patient reports that around 2 weeks ago she bought new shoes. When she wore them she developed a blister on the back of the heel which has slowly healed over the last 2 weeks but has been intermittently red. She has been using triple abx ointment with some good results. Today the wound is much better than yesterday. No fever or chills. She does report a past hx of MRSA.      PMH, Medications and Allergies were reviewed and updated as needed.          OBJECTIVE     Vitals:    06/10/21 0909   BP: 102/66   Pulse: 58   Resp: 16   Temp: 98.5  F (36.9  C)   TempSrc: Oral   SpO2: 96%   Weight: 72.1 kg (159 lb)     Body mass index is 25.66 kg/m .    General :  healthy and alert, no distress  Skin:   Well healing scabbed wound on the back of the left foot/heel. Some mild erythema but no swelling of the skin.       No results found for this or any previous visit (from the past 24 hour(s)).    ASSESSMENT AND PLAN       Kendra was seen today for infection.    Diagnoses and all orders for this visit:    Open wound  -     mupirocin (BACTROBAN) 2 % external ointment; Apply topically 3 times daily    Well healing wound, will change to Bactroban for MRSA coverage however I do think the wound is nearly healed and has a low risk of progressing to cellulitis.     Follow-up as needed  for this if it worsens. Otherwise f/u with Dr. Tom for routine cares as she is due for a CPE with mammogram.     Discussed with MD Philip Smith MD PGY 3  [facility]

## 2021-06-17 NOTE — ED TRIAGE NOTES
Numbness that started 30 minutes ago to occipital head.  States was tingling and numb.  States also felt nausea at that time.  Denies shortness of breath, light headedness, nor dizziness.  bilat arm and leg responses are  is equal.  Denies unit(s) bilateral weakness.

## 2021-06-17 NOTE — ED NOTES
Pt walked back to the ED on her own power. She is speaking clearly and accurately. Pt has visitor with her.

## 2021-06-17 NOTE — ED PROVIDER NOTES
EMERGENCY DEPARTMENT ENCOUNTER      NAME: Kendra Chang  AGE: 55 y.o. female  YOB: 1965  MRN: [id number]  EVALUATION DATE & TIME: 5/4/2021  3:46 AM    PCP: Elfego Tom MD    ED PROVIDER: Bashir Fernández M.D.      Chief Complaint   Patient presents with     Numbness     Nausea         FINAL IMPRESSION:  1. Acute nonintractable headache, unspecified headache type          ED COURSE & MEDICAL DECISION MAKING:    Pertinent Labs & Imaging studies reviewed. (See chart for details)  PPE:  N95, goggles and gloves     3:53 AM I met with the patient for the initial interview and physical examination. Discussed plan for treatment and workup in the ED.   5:12 AM Rechecked and updated patient on results along with plan for disposition.  5:30 AM We discussed the plan for discharge and the patient is agreeable. Reviewed supportive cares, symptomatic treatment, outpatient follow up, and reasons to return to the Emergency Department. Patient to be discharged by ED RN.     55 y.o. female presents to the Emergency Department for evaluation of left posterior headache.  Somewhat dull and tingling.  No obvious rash though concern for early zoster.  Normal neurologic exam.  No nystagmus.  Seems highly unlikely that it is a posterior fossa stroke or bleed.  Did discuss this with the patient.  Did offer head CT though I think it is unlikely to show any cause.  They agree to try conservative management first.  Given Toradol and Zofran here.  Symptoms completely resolved.  We will give her a prescription for Valtrex in case the rash does develop.  Will return for any worsening symptoms.  Patient discharged home.    At the conclusion of the encounter I discussed the results of all of the tests and the disposition. The questions were answered. The patient or family acknowledged understanding and was agreeable with the care plan.         MEDICATIONS GIVEN IN THE EMERGENCY:  Medications   sodium chloride flush 10 mL (NS)  (has no administration in time range)   ketorolac injection 30 mg (TORADOL) (30 mg Intravenous Given 5/4/21 0436)   ondansetron injection 4 mg (ZOFRAN) (4 mg Intravenous Given 5/4/21 0434)       NEW PRESCRIPTIONS STARTED AT TODAY'S ER VISIT  Discharge Medication List as of 5/4/2021  5:30 AM      START taking these medications    Details   valACYclovir (VALTREX) 1000 MG tablet Take 1 tablet (1,000 mg total) by mouth 3 (three) times a day for 7 days., Starting Tue 5/4/2021, Until Tue 5/11/2021, Print         CONTINUE these medications which have NOT CHANGED    Details   acetaminophen (TYLENOL) 500 MG tablet Take 2 tablets (1,000 mg total) by mouth every 8 (eight) hours., Starting 10/14/2018, Until Discontinued, OTC      celecoxib (CELEBREX) 200 MG capsule Take 1 capsule (200 mg total) by mouth 2 (two) times a day as needed for pain., Starting 10/14/2018, Until Discontinued, No Print      levothyroxine (SYNTHROID, LEVOTHROID) 50 MCG tablet Take 50 mcg by mouth daily., Until Discontinued, Historical Med      lidocaine 4 % patch Place 1 patch on the skin at bedtime. Remove and discard patch with 12 hours or as directed by MD., Starting 10/14/2018, Until Discontinued, Normal      lisinopril (PRINIVIL,ZESTRIL) 10 MG tablet Take 20 mg by mouth every evening. , Until Discontinued, Historical Med      multivitamin therapeutic tablet Take 1 tablet by mouth daily., Until Discontinued, Historical Med      ondansetron (ZOFRAN-ODT) 4 MG disintegrating tablet Take 1 tablet (4 mg total) by mouth every 8 (eight) hours as needed for nausea., Starting Tue 8/18/2020, Normal      oxyCODONE (ROXICODONE) 10 mg immediate release tablet Take 1-1.5 tablets (10-15 mg total) by mouth every 6 (six) hours as needed., Starting 10/14/2018, Until Discontinued, Print                =================================================================    HPI    Patient information was obtained from: Patient    Use of : N/A      Kendra Chang  is a 55 y.o. female with a pertinent history of hypertension, disease of thyroid gland who presents to this ED via private vehicle with daughter for evaluation of posterior head numbness, mild posterior headache, and nausea.    Patient woke around 0245 this morning (~1hr ago) with reports of acute posterior head numbness, mild posterior headache, and nausea. Patient states she felt at baseline yesterday and when she went to bed. She has never experienced symptoms like this previously. Patient denies recent falls or head trauma. She denies vision changes, acute light headedness/room spinning dizziness, acute extremity weakness or numbness. Denies recent chiropractic adjustment.    She states she recently started a new medication for urinary incontinence, otherwise denies changes in medications or use of new medications. She denies recent illness or known sick contacts. Denies fever, chills, chest pain, shortness of breath, vomiting, abdominal pain, urinary symptoms, bowel movement changes, or any other symptoms at this time.    REVIEW OF SYSTEMS   Review of Systems   Constitutional: Negative for chills, fatigue and fever.   HENT: Negative for sore throat and trouble swallowing.    Eyes: Negative for visual disturbance.   Respiratory: Negative for cough and shortness of breath.    Cardiovascular: Negative for chest pain.   Gastrointestinal: Positive for nausea. Negative for abdominal pain, diarrhea and vomiting.   Genitourinary: Negative for difficulty urinating and dysuria.   Musculoskeletal: Negative for arthralgias and neck pain.   Skin: Negative for rash.   Neurological: Positive for numbness (posterior scalp) and headaches (posterior). Negative for weakness and light-headedness.   All other systems reviewed and are negative.      PAST MEDICAL HISTORY:  Past Medical History:   Diagnosis Date     Arthritis     back, neck     Disease of thyroid gland      History of transfusion      Hypertension        PAST SURGICAL  "HISTORY:  Past Surgical History:   Procedure Laterality Date     ABDOMINAL SURGERY      cholecystectomy     APPENDECTOMY       CLOSED REDUCTION HIP DISLOCATION Right 6/7/2018    Procedure: CLOSED REDUCTION, RIGHT HIP;  Surgeon: Salvatore Cosme MD;  Location: [facility];  Service:      FRACTURE SURGERY      elbow and right hip     HYSTERECTOMY  1992     JOINT REPLACEMENT      right hip 4 times     OOPHORECTOMY      One ovary     TONSILLECTOMY             CURRENT MEDICATIONS:    No current facility-administered medications on file prior to encounter.      Current Outpatient Medications on File Prior to Encounter   Medication Sig     acetaminophen (TYLENOL) 500 MG tablet Take 2 tablets (1,000 mg total) by mouth every 8 (eight) hours.     celecoxib (CELEBREX) 200 MG capsule Take 1 capsule (200 mg total) by mouth 2 (two) times a day as needed for pain.     levothyroxine (SYNTHROID, LEVOTHROID) 50 MCG tablet Take 50 mcg by mouth daily.     lidocaine 4 % patch Place 1 patch on the skin at bedtime. Remove and discard patch with 12 hours or as directed by MD.     lisinopril (PRINIVIL,ZESTRIL) 10 MG tablet Take 20 mg by mouth every evening.      multivitamin therapeutic tablet Take 1 tablet by mouth daily.     ondansetron (ZOFRAN-ODT) 4 MG disintegrating tablet Take 1 tablet (4 mg total) by mouth every 8 (eight) hours as needed for nausea.     oxyCODONE (ROXICODONE) 10 mg immediate release tablet Take 1-1.5 tablets (10-15 mg total) by mouth every 6 (six) hours as needed.       ALLERGIES:  Allergies   Allergen Reactions     Compazine [Prochlorperazine] Other (See Comments)     Dystonic reaction     Latex Hives and Itching     Morphine      Reglan [Metoclopramide Hcl] Other (See Comments)     Dystonic reaction     Penicillins Rash     Patient stated \"my mother told me this happened to me as a child\"       FAMILY HISTORY:  Family History   Problem Relation Age of Onset     Cancer Mother 50        Uterine, Esophageal, " "Urethra     Cancer Father 65        Prostate     Breast cancer Paternal Aunt 60       SOCIAL HISTORY:   Social History     Socioeconomic History     Marital status:      Spouse name: Not on file     Number of children: Not on file     Years of education: Not on file     Highest education level: Not on file   Occupational History     Not on file   Social Needs     Financial resource strain: Not on file     Food insecurity     Worry: Not on file     Inability: Not on file     Transportation needs     Medical: Not on file     Non-medical: Not on file   Tobacco Use     Smoking status: Former Smoker     Quit date:      Years since quittin.3     Smokeless tobacco: Never Used   Substance and Sexual Activity     Alcohol use: No     Drug use: No     Sexual activity: Yes     Partners: Male   Lifestyle     Physical activity     Days per week: Not on file     Minutes per session: Not on file     Stress: Not on file   Relationships     Social connections     Talks on phone: Not on file     Gets together: Not on file     Attends Orthodoxy service: Not on file     Active member of club or organization: Not on file     Attends meetings of clubs or organizations: Not on file     Relationship status: Not on file     Intimate partner violence     Fear of current or ex partner: Not on file     Emotionally abused: Not on file     Physically abused: Not on file     Forced sexual activity: Not on file   Other Topics Concern     Not on file   Social History Narrative    Lives with family.       VITALS:  Patient Vitals for the past 24 hrs:   BP Temp Temp src Pulse Resp SpO2 Height Weight   21 0513 104/55 -- -- 64 -- 99 % -- --   21 0430 118/58 -- -- 63 -- 99 % -- --   21 0415 118/73 -- -- 64 -- 100 % -- --   21 0400 118/65 -- -- 66 -- 100 % -- --   21 0353 115/64 -- -- 62 -- 100 % -- --   21 0338 144/61 97.9  F (36.6  C) Oral 64 20 100 % 5' 6\" (1.676 m) 160 lb (72.6 kg)       PHYSICAL EXAM "    Constitutional:  Well developed, well nourished, no acute distress   EYES: Conjunctivae clear  HENT:  Atraumatic, normocephalic Bilateral external ears normal, nose normal, oropharynx moist. Neck- supple   Respiratory:  No respiratory distress, normal breath sounds, no rales, no wheezing, no cough, no stridor   Cardiovascular:  Normal rate, normal rhythm, no murmurs, capillary refill normal.  No chest wall tenderness  GI:  Soft, nondistended, nontender, no palpable masses, no rebound, no guarding   : No CVA tenderness.    Musculoskeletal:  No edema.  No cyanosis.  Range of motion major extremities intact. No tenderness to palpation or major deformities noted.   Integument: Warm, Dry, No erythema, No rash.   Psych: Affect normal, Mood normal.  Neurologic:  Alert & oriented,5 out of 5 strength in bilateral upper and lower extremities.  Sensation intact in all 4 extremes.  Cranial nerves intact.  No pronator drift  ambulatory    NIH Stroke Scale      Interval: Baseline  Time: 3:57 AM  Person Administering Scale: Bashir Fernández MD    Administer stroke scale items in the order listed. Record performance in each category after each subscale exam. Do not go back and change scores. Follow directions provided for each exam technique. Scores should reflect what the patient does, not what the clinician thinks the patient can do. The clinician should record answers while administering the exam and work quickly. Except where indicated, the patient should not be coached (i.e., repeated requests to patient to make a special effort).      1a  Level of consciousness: 0=alert; keenly responsive   1b. LOC questions:  0=Performs both tasks correctly   1c. LOC commands: 0=Performs both tasks correctly   2.  Best Gaze: 0=normal   3.  Visual: 0=No visual loss   4. Facial Palsy: 0=Normal symmetric movement   5a.  Motor left arm: 0=No drift, limb holds 90 (or 45) degrees for full 10 seconds   5b.  Motor right arm: 0=No drift,  limb holds 90 (or 45) degrees for full 10 seconds   6a. motor left le=No drift, limb holds 90 (or 45) degrees for full 10 seconds   6b  Motor right le=No drift, limb holds 90 (or 45) degrees for full 10 seconds   7. Limb Ataxia: 0=Absent   8.  Sensory: 0=Normal; no sensory loss   9. Best Language:  0=No aphasia, normal   10. Dysarthria: 0=Normal   11. Extinction and Inattention: 0=No abnormality   12. Distal motor function: 0=Normal    Total:   0          LAB:  All pertinent labs reviewed and interpreted.  Results for orders placed or performed during the hospital encounter of 21   Basic Metabolic Panel   Result Value Ref Range    Sodium 139 136 - 145 mmol/L    Potassium 3.7 3.5 - 5.0 mmol/L    Chloride 103 98 - 107 mmol/L    CO2 28 22 - 31 mmol/L    Anion Gap, Calculation 8 5 - 18 mmol/L    Glucose 77 70 - 125 mg/dL    Calcium 8.7 8.5 - 10.5 mg/dL    BUN 9 8 - 22 mg/dL    Creatinine 0.78 0.60 - 1.10 mg/dL    GFR MDRD Af Amer >60 >60 mL/min/1.73m2    GFR MDRD Non Af Amer >60 >60 mL/min/1.73m2       RADIOLOGY:  Reviewed all pertinent imaging. Please see official radiology report.  No results found.      I, Biju Juarez, am serving as a scribe to document services personally performed by Dr. Bashir Fernández based on my observation and the provider's statements to me. I, Bashir Fernández MD attest that Biju Juarez is acting in a scribe capacity, has observed my performance of the services and has documented them in accordance with my direction.    Bashir Fernández M.D.  Emergency Medicine  [facility] EMERGENCY ROOM  [address] [place] [address]  Dept: [phone]  Loc: [phone]     Bashir Fernández MD  21 0801

## 2021-06-17 NOTE — ED NOTES
Pt and her daughter concur with triage note. Pt now states slight occipital headache. Daughter and pts  concerned as a close family member had a stroke.

## 2021-06-18 NOTE — ANESTHESIA POSTPROCEDURE EVALUATION
Patient: Kendra Chang  CLOSED REDUCTION, RIGHT HIP  Anesthesia type: general    Patient location: PACU  Last vitals:   Vitals:    06/07/18 1400   BP: 102/59   Pulse: 62   Resp: 11   Temp: 37.3  C (99.2  F)   SpO2: 96%     Post vital signs: stable  Level of consciousness: awake and responds to simple questions  Post-anesthesia pain: pain controlled  Post-anesthesia nausea and vomiting: no  Pulmonary: unassisted, nasal cannula  Cardiovascular: stable and blood pressure at baseline  Hydration: adequate  Anesthetic events: no    QCDR Measures:  ASA# 11 - Anu-op Cardiac Arrest: ASA11B - Patient did NOT experience unanticipated cardiac arrest  ASA# 12 - Nau-op Mortality Rate: ASA12B - Patient did NOT die  ASA# 13 - PACU Re-Intubation Rate: NA - No ETT / LMA used for case  ASA# 10 - Composite Anes Safety: ASA10A - No serious adverse event    Additional Notes:

## 2021-06-18 NOTE — ANESTHESIA PREPROCEDURE EVALUATION
Anesthesia Evaluation      Patient summary reviewed   No history of anesthetic complications     Airway   Mallampati: II  Neck ROM: full   Pulmonary - negative ROS and normal exam                          Cardiovascular - negative ROS and normal exam   Neuro/Psych - negative ROS     Endo/Other - negative ROS      GI/Hepatic/Renal - negative ROS           Dental - normal exam                        Anesthesia Plan  Planned anesthetic: general mask    ASA 2 - emergent   Induction: intravenous   Anesthetic plan and risks discussed with: patient and spouse    Post-op plan: routine recovery

## 2021-06-18 NOTE — ANESTHESIA CARE TRANSFER NOTE
Last vitals:   Vitals:    06/07/18 1312   BP: 122/57   Pulse: 63   Resp: 12   Temp: 36.8  C (98.2  F)   SpO2: 100%     Patient's level of consciousness is drowsy  Spontaneous respirations: yes  Maintains airway independently: yes  Dentition unchanged: yes  Oropharynx: oropharynx clear of all foreign objects    QCDR Measures:  ASA# 20 - Surgical Safety Checklist: WHO surgical safety checklist completed prior to induction  PQRS# 430 - Adult PONV Prevention: 4558F - Pt received => 2 anti-emetic agents (different classes) preop & intraop  ASA# 8 - Peds PONV Prevention: NA - Not pediatric patient, not GA or 2 or more risk factors NOT present  PQRS# 424 - Anu-op Temp Management: 4559F - At least one body temp DOCUMENTED => 35.5C or 95.9F within required timeframe  PQRS# 426 - PACU Transfer Protocol: - Transfer of care checklist used  ASA# 14 - Acute Post-op Pain: ASA14B - Patient did NOT experience pain >= 7 out of 10

## 2021-06-22 DIAGNOSIS — I10 ESSENTIAL HYPERTENSION: ICD-10-CM

## 2021-06-23 RX ORDER — LISINOPRIL 20 MG/1
TABLET ORAL
Qty: 90 TABLET | Refills: 0 | Status: SHIPPED | OUTPATIENT
Start: 2021-06-23 | End: 2021-09-20

## 2021-07-10 ENCOUNTER — HEALTH MAINTENANCE LETTER (OUTPATIENT)
Age: 56
End: 2021-07-10

## 2021-07-14 ENCOUNTER — TRANSFERRED RECORDS (OUTPATIENT)
Dept: HEALTH INFORMATION MANAGEMENT | Facility: CLINIC | Age: 56
End: 2021-07-14

## 2021-08-09 DIAGNOSIS — E03.9 HYPOTHYROIDISM, UNSPECIFIED TYPE: ICD-10-CM

## 2021-08-09 RX ORDER — LEVOTHYROXINE SODIUM 50 UG/1
50 TABLET ORAL DAILY
Qty: 90 TABLET | Refills: 0 | Status: SHIPPED | OUTPATIENT
Start: 2021-08-09 | End: 2021-11-08

## 2021-08-11 ENCOUNTER — TRANSFERRED RECORDS (OUTPATIENT)
Dept: HEALTH INFORMATION MANAGEMENT | Facility: CLINIC | Age: 56
End: 2021-08-11

## 2021-08-11 LAB — PHQ9 SCORE: 0

## 2021-08-20 DIAGNOSIS — K21.9 GASTROESOPHAGEAL REFLUX DISEASE, UNSPECIFIED WHETHER ESOPHAGITIS PRESENT: ICD-10-CM

## 2021-08-23 RX ORDER — OMEPRAZOLE 40 MG/1
CAPSULE, DELAYED RELEASE ORAL
Qty: 90 CAPSULE | Refills: 0 | Status: SHIPPED | OUTPATIENT
Start: 2021-08-23 | End: 2021-11-08

## 2021-09-04 ENCOUNTER — HEALTH MAINTENANCE LETTER (OUTPATIENT)
Age: 56
End: 2021-09-04

## 2021-09-04 DIAGNOSIS — N39.41 URGE INCONTINENCE OF URINE: ICD-10-CM

## 2021-09-07 RX ORDER — MIRABEGRON 25 MG/1
TABLET, FILM COATED, EXTENDED RELEASE ORAL
Qty: 30 TABLET | Refills: 0 | Status: SHIPPED | OUTPATIENT
Start: 2021-09-07 | End: 2021-10-15

## 2021-09-13 ENCOUNTER — TRANSFERRED RECORDS (OUTPATIENT)
Dept: HEALTH INFORMATION MANAGEMENT | Facility: CLINIC | Age: 56
End: 2021-09-13

## 2021-09-19 DIAGNOSIS — I10 ESSENTIAL HYPERTENSION: ICD-10-CM

## 2021-09-20 RX ORDER — LISINOPRIL 20 MG/1
TABLET ORAL
Qty: 90 TABLET | Refills: 0 | Status: SHIPPED | OUTPATIENT
Start: 2021-09-20 | End: 2022-07-19

## 2021-10-11 ENCOUNTER — TRANSFERRED RECORDS (OUTPATIENT)
Dept: HEALTH INFORMATION MANAGEMENT | Facility: CLINIC | Age: 56
End: 2021-10-11

## 2021-10-11 LAB — PHQ9 SCORE: 0

## 2021-10-12 ENCOUNTER — OFFICE VISIT (OUTPATIENT)
Dept: FAMILY MEDICINE | Facility: CLINIC | Age: 56
End: 2021-10-12
Payer: COMMERCIAL

## 2021-10-12 VITALS
TEMPERATURE: 97.3 F | HEART RATE: 62 BPM | WEIGHT: 159 LBS | SYSTOLIC BLOOD PRESSURE: 103 MMHG | RESPIRATION RATE: 18 BRPM | OXYGEN SATURATION: 96 % | DIASTOLIC BLOOD PRESSURE: 70 MMHG | BODY MASS INDEX: 27.14 KG/M2 | HEIGHT: 64 IN

## 2021-10-12 DIAGNOSIS — Z01.818 PREOP GENERAL PHYSICAL EXAM: Primary | ICD-10-CM

## 2021-10-12 PROCEDURE — 99213 OFFICE O/P EST LOW 20 MIN: CPT | Performed by: FAMILY MEDICINE

## 2021-10-12 ASSESSMENT — MIFFLIN-ST. JEOR: SCORE: 1278.28

## 2021-10-12 NOTE — PROGRESS NOTES
[facility] [address] [place] [address]  Phone: [phone]  Fax: [phone]  Primary Provider: Elfego Tom        PREOPERATIVE EVALUATION:  Today's date: 10/12/2021    Kendra Chang is a 56 year old female who presents for a preoperative evaluation.    Surgical Information:  Surgery/Procedure: Nerve ablation  Surgery Location: [facility] surgical center  Surgeon  Surgery Date: 10/27/21  Time of Surgery:  AM  Where patient plans to recover: At home with family  Fax number for surgical facility:  Copy to patient    Type of Anesthesia Anticipated: Local/sedation        Subjective     HPI related to upcoming procedure:  R leg pain, neuropathic    Preop Questions 10/6/2021   1. Have you ever had a heart attack or stroke? No   2. Have you ever had surgery on your heart or blood vessels, such as a stent placement, a coronary artery bypass, or surgery on an artery in your head, neck, heart, or legs? No   3. Do you have chest pain with activity? No   4. Do you have a history of  heart failure? No   5. Do you currently have a cold, bronchitis or symptoms of other infection? No   6. Do you have a cough, shortness of breath, or wheezing? No   7. Do you or anyone in your family have previous history of blood clots? No   8. Do you or does anyone in your family have a serious bleeding problem such as prolonged bleeding following surgeries or cuts? No   9. Have you ever had problems with anemia or been told to take iron pills? yes   10. Have you had any abnormal blood loss such as black, tarry or bloody stools, or abnormal vaginal bleeding? No   11. Have you ever had a blood transfusion? YES - after surgery   11a. Have you ever had a transfusion reaction? No   12. Are you willing to have a blood transfusion if it is medically needed before, during, or after your surgery? Yes   13. Have you or any of your relatives ever had problems with anesthesia? no   14. Do you have sleep apnea,  excessive snoring or daytime drowsiness? No   15. Do you have any artifical heart valves or other implanted medical devices like a pacemaker, defibrillator, or continuous glucose monitor? No   16. Do you have artificial joints? YES - R Hip   17. Are you allergic to latex? YES: itchy   18. Is there any chance that you may be pregnant? No       Health Care Directive:  Patient does not have a Health Care Directive or Living Will: Discussed advance care planning with patient; however, patient declined at this time.    Preoperative Review of : At pain clinic    [id number]}    Status of Chronic Conditions:  See problem list for active medical problems.  Problems all longstanding and stable, except as noted/documented.  See ROS for pertinent symptoms related to these conditions.      Review of Systems  CONSTITUTIONAL: NEGATIVE for fever, chills, change in weight  ENT/MOUTH: NEGATIVE for ear, mouth and throat problems  RESP: NEGATIVE for significant cough or SOB  CV: NEGATIVE for chest pain, palpitations or peripheral edema    Patient Active Problem List    Diagnosis Date Noted     S/P hip replacement 04/15/2013     Priority: High     S/p right hip replacement with revision       Hip pain 12/04/2012     Priority: High     Congenital hip dysplasia.  Dr London Alegria @ [facility].       Chronic pain syndrome 07/15/2016     Priority: Medium     Chronic pain diagnosis: **  DIRE: score 14 initial date 9/27/16, most recent update 9/27/16     (14-21: may be a candidate for opioid therapy)  ORT:  score 10 initial date 9/27/16, most recent update 9/29/16    (Low Risk 0 - 3, Moderate Risk 4 - 7, High Risk > 8)  FAQ: baseline score 60/100, date 9/27/16, most recent update 9/29/16   Behavioral Health Consultation: date 9/29/16, provider, Nathaniel Lombardi, PhD  Personal Care Plan for Chronic Pain: initial date 9/29/16, most recent update 10/4/16   Reviewed in interprofessional team meeting:  initial date 7/14/16, most recent update  7/14/16    This patient has completed CPM assessment and has been deemed a poor candidate for opiate therapy at this time.  No opiates should be prescribed for this patient.   OR  Monthly medication(s): **, dose, # provided  Morphine equivalents = ** mg/ day   MME > 90 require review by CPM supervisory committee.   Date reviewed by oversight committee: ** or NA, Status: **  Opioid treatment agreement: initial date **, provider, **, date of most recent update **         DJD (degenerative joint disease), lumbar 01/05/2016     Priority: Medium     L4-5: Disc bulge and bilateral facet hypertrophy with mild bilateral  neural foramina narrowing, stable mild spinal canal narrowing.       Hypothyroidism 07/31/2014     Priority: Medium     S/P revision of total hip 05/21/2013     Priority: Medium     H/O: hysterectomy 05/09/2013     Priority: Medium     Rosacea 12/03/2012     Priority: Medium     Hx of ectopic pregnancy 05/09/2013     Priority: Low     Needed open abdominal surgery for ectopic pregnancies 1987, 1988. 1992  Had general    Had spinal with HIP REPLACEMENT         Arm fracture 05/09/2013     Priority: Low     Right, 1995       Other atopic dermatitis and related conditions 12/03/2012     Priority: Low     Contact dermatitis and other eczema, due to unspecified cause 12/03/2012     Priority: Low     Health Care Home 12/03/2012     Priority: Low     Tier Level: 1  DX V65.8 REPLACED WITH [id number]ALTH CARE HOME (04/08/2013)        Past Medical History:   Diagnosis Date     Arthritis     dysplagia right hip     Arthritis     back, neck     Disease of thyroid gland      Eczema     hands     Gastro-oesophageal reflux disease      GERD (gastroesophageal reflux disease)      History of blood transfusion      History of transfusion      Hypertension      Mumps      Palpitations      STD (sexually transmitted disease)      Thyroid disease      Past Surgical History:   Procedure Laterality Date     ABDOMEN SURGERY       cholecystectomy     APPENDECTOMY       APPENDECTOMY       ARTHROPLASTY REVISION HIP  5/21/2013    Procedure: ARTHROPLASTY REVISION HIP;  Right Total Hip Arthroplasty Revision   LATEX ALLERGY;  Surgeon: Fran Panda MD;  Location: [facility] OR     CHOLECYSTECTOMY       CLOSED REDUCTION HIP Right 6/7/2018    Procedure: CLOSED REDUCTION, RIGHT HIP;  Surgeon: Salvatore Cosme MD;  Location: [facility] OR;  Service:      ENT SURGERY      tonsillectomy     FRACTURE SURGERY      elbow and right hip     GENITOURINARY SURGERY      3 tubal pregnancies     GYN SURGERY       HYSTERECTOMY       HYSTERECTOMY  1992     JOINT REPLACEMENT      right hip 4 times     OOPHORECTOMY      One ovary     ORTHOPEDIC SURGERY      Right total hip ('98) & revision ('11) & hardware removal ('11)     ORTHOPEDIC SURGERY      Right elbow surgery x 3     TONSILLECTOMY       Current Outpatient Medications   Medication Sig Dispense Refill     alendronate (FOSAMAX) 70 MG tablet Take 1 tablet (70 mg) by mouth every 7 days 30 tablet 4     Buprenorphine HCl (BELBUCA) 300 MCG FILM buccal film Place 300 mcg inside cheek every 12 hours       Cholecalciferol (VITAMIN D) 400 UNITS capsule Take 1 capsule by mouth daily.       estradiol (ESTRACE VAGINAL) 0.1 MG/GM vaginal cream Apply small amount to the vaginal opening and urethra M, W, F @ h.s. 42.5 g 3     IBUPROFEN PO        levothyroxine (SYNTHROID/LEVOTHROID) 50 MCG tablet Take 1 tablet (50 mcg) by mouth daily 90 tablet 0     lisinopril (ZESTRIL) 20 MG tablet TAKE ONE TABLET BY MOUTH ONE TIME DAILY   90 tablet 0     Multiple Vitamin (MULTI-VITAMIN PO) Take  by mouth.       MYRBETRIQ 25 MG 24 hr tablet Take 1 tablet (25 mg) by mouth daily 30 tablet 0     omeprazole (PRILOSEC) 40 MG DR capsule TAKE ONE CAPSULE BY MOUTH ONE TIME DAILY  90 capsule 0     oxyCODONE-acetaminophen (PERCOCET)  MG per tablet Take 1 tablet by mouth every 6 hours as needed for severe pain       mupirocin (BACTROBAN) 2 % external  "ointment Apply topically 3 times daily (Patient not taking: Reported on 10/12/2021) 30 g 3     OMEPRAZOLE PO        oxybutynin ER (DITROPAN-XL) 5 MG 24 hr tablet Take 1 tablet (5 mg) by mouth daily (Patient not taking: Reported on 2021) 90 tablet 1       Allergies   Allergen Reactions     Novocain [Procaine Hcl] Shortness Of Breath     Compazine      Feels like jaw is going out of socket     Latex Hives and Itching     Metoclopramide      \"dystonic reaction\"      Morphine Hcl Hives     Penicillins      Phenothiazines      \"dystonic reaction\"      Procaine      Pt denies allergy. Had tachycardia in dental office after local injecion, likely due to epinephrine.     Reglan Other (See Comments)     Joint issue with jaw, feels painful        Social History     Tobacco Use     Smoking status: Former Smoker     Packs/day: 0.00     Quit date: 2/15/1985     Years since quittin.6     Smokeless tobacco: Never Used   Substance Use Topics     Alcohol use: No         Objective     /70 (BP Location: Left arm, Patient Position: Sitting, Cuff Size: Adult Regular)   Pulse 62   Temp 97.3  F (36.3  C) (Tympanic)   Resp 18   Ht 1.613 m (5' 3.5\")   Wt 72.1 kg (159 lb)   SpO2 96%   BMI 27.72 kg/m      Physical Exam  GENERAL APPEARANCE: healthy, alert and no distress  HENT: ear canals and TM's normal and nose and mouth without ulcers or lesions  RESP: lungs clear to auscultation - no rales, rhonchi or wheezes  CV: regular rate and rhythm, normal S1 S2, no S3 or S4 and no murmur, click or rub   ABDOMEN: soft, nontender, no HSM or masses and bowel sounds normal  NEURO: Normal strength and tone, sensory exam grossly normal, mentation intact and speech normal    Recent Labs   Lab Test 21  0431 20  1443 01/15/20  1551 01/15/20  1534   HGB  --  11.8  --  11.3*    136.1   < >  --    POTASSIUM 3.7 4.0   < >  --    CR 0.78 0.8   < >  --    A1C  --   --   --  5.2    < > = values in this interval not " displayed.        Diagnostics:   None    Revised Cardiac Risk Index (RCRI):  The patient has the following serious cardiovascular risks for perioperative complications:   - No serious cardiac risks = 0 points     RCRI Interpretation: 0 points: Class I (very low risk - 0.4% complication rate)           Signed Electronically by: Elfego Tom MD  Copy of this evaluation report is provided to requesting physician.

## 2021-10-15 DIAGNOSIS — N39.41 URGE INCONTINENCE OF URINE: ICD-10-CM

## 2021-10-15 RX ORDER — MIRABEGRON 25 MG/1
TABLET, FILM COATED, EXTENDED RELEASE ORAL
Qty: 90 TABLET | Refills: 3 | Status: SHIPPED | OUTPATIENT
Start: 2021-10-15 | End: 2024-09-26

## 2021-11-08 DIAGNOSIS — E03.9 HYPOTHYROIDISM, UNSPECIFIED TYPE: ICD-10-CM

## 2021-11-08 DIAGNOSIS — K21.9 GASTROESOPHAGEAL REFLUX DISEASE, UNSPECIFIED WHETHER ESOPHAGITIS PRESENT: ICD-10-CM

## 2021-11-08 RX ORDER — OMEPRAZOLE 40 MG/1
CAPSULE, DELAYED RELEASE ORAL
Qty: 90 CAPSULE | Refills: 0 | Status: SHIPPED | OUTPATIENT
Start: 2021-11-08 | End: 2022-04-18

## 2021-11-08 RX ORDER — LEVOTHYROXINE SODIUM 50 UG/1
TABLET ORAL
Qty: 90 TABLET | Refills: 0 | Status: SHIPPED | OUTPATIENT
Start: 2021-11-08 | End: 2022-02-09

## 2021-11-11 ENCOUNTER — TRANSFERRED RECORDS (OUTPATIENT)
Dept: HEALTH INFORMATION MANAGEMENT | Facility: CLINIC | Age: 56
End: 2021-11-11
Payer: COMMERCIAL

## 2021-12-13 ENCOUNTER — TRANSFERRED RECORDS (OUTPATIENT)
Dept: HEALTH INFORMATION MANAGEMENT | Facility: CLINIC | Age: 56
End: 2021-12-13
Payer: COMMERCIAL

## 2022-01-11 ENCOUNTER — TRANSFERRED RECORDS (OUTPATIENT)
Dept: HEALTH INFORMATION MANAGEMENT | Facility: CLINIC | Age: 57
End: 2022-01-11

## 2022-02-09 DIAGNOSIS — E03.9 HYPOTHYROIDISM, UNSPECIFIED TYPE: ICD-10-CM

## 2022-02-09 RX ORDER — LEVOTHYROXINE SODIUM 50 UG/1
TABLET ORAL
Qty: 90 TABLET | Refills: 0 | Status: SHIPPED | OUTPATIENT
Start: 2022-02-09 | End: 2022-05-23

## 2022-02-10 ENCOUNTER — TRANSFERRED RECORDS (OUTPATIENT)
Dept: HEALTH INFORMATION MANAGEMENT | Facility: CLINIC | Age: 57
End: 2022-02-10

## 2022-02-19 ENCOUNTER — HEALTH MAINTENANCE LETTER (OUTPATIENT)
Age: 57
End: 2022-02-19

## 2022-03-11 ENCOUNTER — TRANSFERRED RECORDS (OUTPATIENT)
Dept: HEALTH INFORMATION MANAGEMENT | Facility: CLINIC | Age: 57
End: 2022-03-11

## 2022-04-04 DIAGNOSIS — M81.6 LOCALIZED OSTEOPOROSIS WITHOUT CURRENT PATHOLOGICAL FRACTURE: ICD-10-CM

## 2022-04-05 RX ORDER — ALENDRONATE SODIUM 70 MG/1
70 TABLET ORAL
Qty: 30 TABLET | Refills: 0 | Status: SHIPPED | OUTPATIENT
Start: 2022-04-05 | End: 2024-09-26

## 2022-04-08 ENCOUNTER — TRANSFERRED RECORDS (OUTPATIENT)
Dept: HEALTH INFORMATION MANAGEMENT | Facility: CLINIC | Age: 57
End: 2022-04-08

## 2022-04-14 DIAGNOSIS — K21.9 GASTROESOPHAGEAL REFLUX DISEASE, UNSPECIFIED WHETHER ESOPHAGITIS PRESENT: ICD-10-CM

## 2022-04-18 RX ORDER — OMEPRAZOLE 40 MG/1
CAPSULE, DELAYED RELEASE ORAL
Qty: 90 CAPSULE | Refills: 0 | Status: SHIPPED | OUTPATIENT
Start: 2022-04-18 | End: 2022-07-19

## 2022-05-12 ENCOUNTER — TRANSFERRED RECORDS (OUTPATIENT)
Dept: HEALTH INFORMATION MANAGEMENT | Facility: CLINIC | Age: 57
End: 2022-05-12
Payer: COMMERCIAL

## 2022-05-23 DIAGNOSIS — E03.9 HYPOTHYROIDISM, UNSPECIFIED TYPE: ICD-10-CM

## 2022-05-23 RX ORDER — LEVOTHYROXINE SODIUM 50 UG/1
TABLET ORAL
Qty: 90 TABLET | Refills: 0 | Status: SHIPPED | OUTPATIENT
Start: 2022-05-23 | End: 2022-07-19

## 2022-05-24 ENCOUNTER — OFFICE VISIT (OUTPATIENT)
Dept: FAMILY MEDICINE | Facility: CLINIC | Age: 57
End: 2022-05-24
Payer: COMMERCIAL

## 2022-05-24 VITALS
SYSTOLIC BLOOD PRESSURE: 110 MMHG | WEIGHT: 167 LBS | DIASTOLIC BLOOD PRESSURE: 69 MMHG | OXYGEN SATURATION: 99 % | HEART RATE: 57 BPM | BODY MASS INDEX: 29.12 KG/M2 | RESPIRATION RATE: 18 BRPM | TEMPERATURE: 97.9 F

## 2022-05-24 DIAGNOSIS — E03.9 HYPOTHYROIDISM, UNSPECIFIED TYPE: Primary | ICD-10-CM

## 2022-05-24 DIAGNOSIS — M19.041 OSTEOARTHRITIS OF RIGHT HAND, UNSPECIFIED OSTEOARTHRITIS TYPE: ICD-10-CM

## 2022-05-24 LAB — TSH SERPL DL<=0.005 MIU/L-ACNC: 1.79 UIU/ML (ref 0.3–5)

## 2022-05-24 PROCEDURE — 84443 ASSAY THYROID STIM HORMONE: CPT | Performed by: FAMILY MEDICINE

## 2022-05-24 PROCEDURE — 36415 COLL VENOUS BLD VENIPUNCTURE: CPT | Performed by: FAMILY MEDICINE

## 2022-05-24 PROCEDURE — 99214 OFFICE O/P EST MOD 30 MIN: CPT | Performed by: FAMILY MEDICINE

## 2022-05-24 NOTE — PROGRESS NOTES
S: Kendra Chang is a 56 year old female who presents to clinic for right hand pain. She is having a diffuse aching pain that is worse in the joints. She is right handed. Uses a cane and hold the cane in her right hand. No weakness or numbness.     PMHX/PSHX/MEDS/ALLERGIES/SHX/FHX reviewed and updated in Epic.    ROS:  General: No fevers, chills  Head: No headache  Ears: No acute change in hearing.    CV: No chest pain or palpitations.  Resp: No shortness of breath.  No cough. No hemoptysis.  GI: No nausea, vomiting, constipation, diarrhea  : No urinary pains    O: /69 (BP Location: Left arm, Patient Position: Sitting, Cuff Size: Adult Large)   Pulse 57   Temp 97.9  F (36.6  C) (Tympanic)   Resp 18   Wt 75.8 kg (167 lb)   SpO2 99%   BMI 29.12 kg/m     Gen:  Well nourished and in NAD  Pulm: Normal respiratory effort   ABD: soft, nontender, no masses, no rebound, BS intact throughout  Extrem: Strength and sensation intact in both arms and hands. Negative Tinel test in right hand. Finger to thumb coordination is intact. No numbness with percussion of ulnar nerve.   Psych: Euthymic      Assessment and Plan:  Osteoarthritis of right hand, unspecified osteoarthritis type  Patients history of hand pain in her dominant hand is concerning for osteoarthritis. This is the hand she uses her can with. Exam is reassuring against median or ulnar nerve entrapment. Lack of bilateral or systemic symptoms is reassuring against RA. Recommend for her to work with physical therapy to build strength in that hand.  - diclofenac (VOLTAREN) 1 % topical gel; Apply 4 g topically 2 times daily  Dispense: 100 g; Refill: 1    Hypothyroidism, unspecified type  - TSH with free T4 reflex; Future  - TSH with free T4 reflex    .    RTC in 4 weeks, for follow up of hand pain or sooner if develops new or worsening symptoms.        Elfego Tom MD

## 2022-05-24 NOTE — LETTER
May 25, 2022      Gwendolyn Chang  [address] [facility] [address]        Dear ,    We are writing to inform you of your test results.    TSH is in normal range     No change  in synthroid dose       Resulted Orders   TSH with free T4 reflex   Result Value Ref Range    TSH 1.79 0.30 - 5.00 uIU/mL       If you have any questions or concerns, please call the clinic at the number listed above.       Sincerely,      Elfego Tom MD

## 2022-06-06 ENCOUNTER — OFFICE VISIT (OUTPATIENT)
Dept: FAMILY MEDICINE | Facility: CLINIC | Age: 57
End: 2022-06-06
Payer: COMMERCIAL

## 2022-06-06 VITALS
DIASTOLIC BLOOD PRESSURE: 74 MMHG | BODY MASS INDEX: 28.04 KG/M2 | RESPIRATION RATE: 18 BRPM | HEART RATE: 51 BPM | TEMPERATURE: 98.3 F | OXYGEN SATURATION: 98 % | WEIGHT: 160.8 LBS | SYSTOLIC BLOOD PRESSURE: 117 MMHG

## 2022-06-06 DIAGNOSIS — Z01.818 PREOP GENERAL PHYSICAL EXAM: Primary | ICD-10-CM

## 2022-06-06 PROCEDURE — 99214 OFFICE O/P EST MOD 30 MIN: CPT | Mod: GC | Performed by: STUDENT IN AN ORGANIZED HEALTH CARE EDUCATION/TRAINING PROGRAM

## 2022-06-06 NOTE — PATIENT INSTRUCTIONS
Preparing for Your Surgery  Getting started  A nurse will call you to review your health history and instructions. They will give you an arrival time based on your scheduled surgery time. Please be ready to share:  Your doctor's clinic name and phone number  Your medical, surgical and anesthesia history  A list of allergies and sensitivities  A list of medicines, including herbal treatments and over-the-counter drugs  Whether the patient has a legal guardian (ask how to send us the papers in advance)  Please tell us if you're pregnant--or if there's any chance you might be pregnant. Some surgeries may injure a fetus (unborn baby), so they require a pregnancy test. Surgeries that are safe for a fetus don't always need a test, and you can choose whether to have one.   If you have a child who's having surgery, please ask for a copy of Preparing for Your Child's Surgery.    Preparing for surgery  Within 30 days of surgery: Have a pre-op exam (sometimes called an H&P, or History and Physical). This can be done at a clinic or pre-operative center.  If you're having a , you may not need this exam. Talk to your care team.  At your pre-op exam, talk to your care team about all medicines you take. If you need to stop any medicines before surgery, ask when to start taking them again.  We do this for your safety. Many medicines can make you bleed too much during surgery. Some change how well surgery (anesthesia) drugs work.  Call your insurance company to let them know you're having surgery. (If you don't have insurance, call [phone].)  Call your clinic if there's any change in your health. This includes signs of a cold or flu (sore throat, runny nose, cough, rash, fever). It also includes a scrape or scratch near the surgery site.  If you have questions on the day of surgery, call your hospital or surgery center.  COVID testing  You may need to be tested for COVID-19 before having surgery. If so, we will give  you instructions.  Eating and drinking guidelines  For your safety: Unless your surgeon tells you otherwise, follow the guidelines below.  Eat and drink as usual until 8 hours before surgery. After that, no food or milk.  Drink clear liquids until 2 hours before surgery. These are liquids you can see through, like water, Gatorade and Propel Water. You may also have black coffee and tea (no cream or milk).  Nothing by mouth within 2 hours of surgery. This includes gum, candy and breath mints.  If you drink alcohol: Stop drinking it the night before surgery.  If your care team tells you to take medicine on the morning of surgery, it's okay to take it with a sip of water.  Preventing infection  Shower or bathe the night before and morning of your surgery. Follow the instructions your clinic gave you. (If no instructions, use regular soap.)  Don't shave or clip hair near your surgery site. We'll remove the hair if needed.  Don't smoke or vape the morning of surgery. You may chew nicotine gum up to 2 hours before surgery. A nicotine patch is okay.  Note: Some surgeries require you to completely quit smoking and nicotine. Check with your surgeon.  Your care team will make every effort to keep you safe from infection. We will:  Clean our hands often with soap and water (or an alcohol-based hand rub).  Clean the skin at your surgery site with a special soap that kills germs.  Give you a special gown to keep you warm. (Cold raises the risk of infection.)  Wear special hair covers, masks, gowns and gloves during surgery.  Give antibiotic medicine, if prescribed. Not all surgeries need antibiotics.  What to bring on the day of surgery  Photo ID and insurance card  Copy of your health care directive, if you have one  Glasses and hearing aides (bring cases)  You can't wear contacts during surgery  Inhaler and eye drops, if you use them (tell us about these when you arrive)  CPAP machine or breathing device, if you use them  A  few personal items, if spending the night  If you have . . .  A pacemaker, ICD (cardiac defibrillator) or other implant: Bring the ID card.  An implanted stimulator: Bring the remote control.  A legal guardian: Bring a copy of the certified (court-stamped) guardianship papers.  Please remove any jewelry, including body piercings. Leave jewelry and other valuables at home.  If you're going home the day of surgery  You must have a responsible adult drive you home. They should stay with you overnight as well.  If you don't have someone to stay with you, and you aren't safe to go home alone, we may keep you overnight. Insurance often won't pay for this.  After surgery  If it's hard to control your pain or you need more pain medicine, please call your surgeon's office.  Questions?   If you have any questions for your care team, list them here: _________________________________________________________________________________________________________________________________________________________________________ ____________________________________ ____________________________________ ____________________________________  For informational purposes only. Not to replace the advice of your health care provider. Copyright   2003, 2019 [employer]. All rights reserved. Clinically reviewed by Ebony Goldman MD. delicious 944861 - REV 07/21.    Before Your Procedure or Hospital Admission  Testing for COVID-19  Thank you for choosing [facility] for your health care needs.The COVID-19 pandemic is a very challenging time for everyone.   Our goal is to keep you and our team here at [facility] safe and healthy. We've taken many steps to make this happen. For example:  We test and screen our staff, care teams and patients for COVID-19.  Everyone at [facility] must wear a mask and stay 6 feet apart.  We are limiting hospital and clinic visitors.  Before you come in  You must get tested for COVID-19, even  "if you've been vaccinated.  If you're going home on the same day as your procedure (surgery):  1 to 2 days before your procedure, take an at-home, rapid antigen test. You can buy these at many pharmacy stores. Or you can order free, at-home tests at [employer]/tests. If you can't find an at-home, rapid antigen test, please schedule a PCR test with [facility] by calling [facility], or visiting [email or web address]. We can't accept tests that are more than 4 days old.  If your test is negative, please take a photo of the test. Show the photo to the nurse when you come in for your procedure.  If your test is positive, please see the \"If your test shows you have COVID-19\" section on this page.  If you're staying overnight in the hospital:  4 days before your procedure, please get a   PCR test from a lab.  To schedule a PCR test with [facility], call [phone]. Or, visit   [email or web address].  If your test is negative, please ask the testing location to fax your results to us at [phone].  If your test is positive, please see the \"If your test shows you have COVID-19\" section below.  After your test and before your procedure, please follow these safetyguidelines:  Limit trips outside your home.  Limit the number of people you see.  Always wear a face covering outside your home.  Use social distancing. Stay 6 feet away from others whenever you can.  Wash your hands often.  If your test shows you have COVID-19  If your test is positive, please tell your surgeon's office right away. A positive test means that you have COVID-19.  We'll probably have to postpone your admission, surgery or procedure. Your care team will discuss this with you. After that, we'll let you know what to do and when you can re-schedule.  If your test shows you DON'T have COVID-19  Even if your test is negative, you can still get COVID-19. It's rare, but sometimes the test result is wrong. " You could also catch the virus after taking the test.   There's a very small chance that you could catch COVID-19 in the hospital or surgery center. [facility] has taken many steps to prevent this from happening.   Possible surgery delay   Like you, we want your surgery to happen when it's scheduled. But sometimes the hospital is so full that it's not safe for you to have your surgery. This is especially true during the pandemic. Your surgery may need to be re-scheduled at a later date. If thishappens, we will call and tell you.   Day of your surgery or procedure  Please come wearing a face covering that covers both your nose and mouth.  When you arrive, we'll ask you some questions to find out if you've had any exposures to COVID-19, or have any signs of COVID-19.  No matter where you took a test, we'll need to have the results. Please ask your testing location to fax the results to us at [phone]. If you took a rapid antigen at-home test, you can bring a photo of the results with you to your procedure.  Ask your care team if you can have visitors. All visitors must wear face coverings and will be screened for exposure to, or signs of, COVID-19.  The rules for visitors change often, depending on how much the virus is spreading. To learn more, see Visiting a Loved One in the Hospital during the COVID-19 Outbreak.  Please call your care team, hospital or surgery center if you have any questions. We thank you for your understanding and for choosing [facility] for your care.   Questions and answers  Does it matter how I get tested for COVID-19?  Yes. If the plan is for you to go home on the same day as your procedure, you can take a rapid antigen, at-home test 1 to 2 days before you come in. If your test is negative, please take a photo of your test. Show it to the nurse when you come to the hospital. If you can't find a rapid antigen, at-home test, you can take a PCR test at a lab instead.  If the  plan is for you to stay in the hospital after your surgery, you'll need to get a PCR test from a lab 4 days before your procedure. Ask the testing location to fax your results to [phone].  If we don't get your results, we may have to delay or cancel your treatment.  Do I need to get tested at [facility]?  No. However, if you need a PCR test from a lab, we recommend using an [facility] lab. We'll get the results more quickly and easily that way. To schedule a test, please call [phone]. Or, visit [email or web address].  For informational purposes only. Not to replace the advice of your health care provider. Copyright   2020 [facility]. All rights reserved. Clinically reviewed by [facility] and the [facility] COVID-19 Clinical Team. WEbook 951093 - Rev 05/26/22.    How to Take Your Medication Before Surgery  - Take all of your medications before surgery except as noted below  - HOLD (do not take) ibuprofen 1-2 days before surgery

## 2022-06-06 NOTE — PROGRESS NOTES
Preceptor Attestation:    I discussed the patient with the resident and evaluated the patient in person. I have verified the content of the note, which accurately reflects my assessment of the patient and the plan of care.   Supervising Physician:  Ronnie Jung MD.

## 2022-06-06 NOTE — NURSING NOTE
Patient called back and the fax number is [phone] for the surgery place.  SAILAJA GutierrezA

## 2022-06-06 NOTE — PROGRESS NOTES
"[facility] [address] [place] [address]  Phone: [phone]  Fax: [phone]  Primary Provider: Elfego Tom  Pre-op Performing Provider: RASHMI ZIMMERMAN    PREOPERATIVE EVALUATION:  Today's date: 6/6/2022    Kendra Chang is a 56 year old female who presents for a preoperative evaluation.    Surgical Information:  Surgery/Procedure: Radio Frequency Ablation Lower Back  Surgery Location: [facility]   Surgeon: Unknown  Surgery Date: 6/10/2022  Time of Surgery: 10:00 AM  Where patient plans to recover: At home with Home Care  Fax number for surgical facility: Will need to obtain - she will call us.     Type of Anesthesia Anticipated: Local with MAC     Assessment & Plan     The proposed surgical procedure is considered LOW risk.    Preop general physical exam  Low risk procedure. Discussed letting anesthesia know if she takes her Percocet morning of ablation. She should hold her ibuprofen d/t bleeding risk.     Risks and Recommendations:  The patient has the following additional risks and recommendations for perioperative complications:   - No identified additional risk factors other than previously addressed    Medication Instructions:   - ACE/ARB: May be continued on the day of surgery.    - ibuprofen (Advil, Motrin): HOLD 1 day before surgery.     RECOMMENDATION:  APPROVAL GIVEN to proceed with proposed procedure, without further diagnostic evaluation.    Subjective     HPI related to upcoming procedure: Back pain for \"years\". She has had an ablation before.  [facility].     Preop Questions 6/6/2022   1. Have you ever had a heart attack or stroke? No   2. Have you ever had surgery on your heart or blood vessels, such as a stent placement, a coronary artery bypass, or surgery on an artery in your head, neck, heart, or legs? No   3. Do you have chest pain with activity? No   4. Do you have a history of  heart failure? No   5. Do " you currently have a cold, bronchitis or symptoms of other infection? No   6. Do you have a cough, shortness of breath, or wheezing? No   7. Do you or anyone in your family have previous history of blood clots? No   8. Do you or does anyone in your family have a serious bleeding problem such as prolonged bleeding following surgeries or cuts? No   9. Have you ever had problems with anemia or been told to take iron pills? No   10. Have you had any abnormal blood loss such as black, tarry or bloody stools, or abnormal vaginal bleeding? No   11. Have you ever had a blood transfusion? YES - No issues with this.    11a. Have you ever had a transfusion reaction? No   12. Are you willing to have a blood transfusion if it is medically needed before, during, or after your surgery? Yes   13. Have you or any of your relatives ever had problems with anesthesia? No   14. Do you have sleep apnea, excessive snoring or daytime drowsiness? No   15. Do you have any artifical heart valves or other implanted medical devices like a pacemaker, defibrillator, or continuous glucose monitor? No   16. Do you have artificial joints? YES - right hip. No issues at present.    17. Are you allergic to latex? YES   18. Is there any chance that you may be pregnant? No       Health Care Directive:  Patient does not have a Health Care Directive or Living Will: Discussed advance care planning with patient; information given to patient to review.    Preoperative Review of :   reviewed - controlled substances reflected in medication list.    Status of Chronic Conditions:  HYPERTENSION - Patient has longstanding history of HTN , currently denies any symptoms referable to elevated blood pressure. Specifically denies chest pain, palpitations, dyspnea, orthopnea, PND or peripheral edema. Blood pressure readings have been in normal range. Current medication regimen is as listed below. Patient denies any side effects of medication.     HYPOTHYROIDISM -  Patient has a longstanding history of chronic Hypothyroidism. Patient has been doing well, noting no tremor, insomnia, hair loss or changes in skin texture. Continues to take medications as directed, without adverse reactions or side effects. Last TSH:  Lab Results   Component Value Date    TSH 1.79 05/24/2022       Review of Systems  CONSTITUTIONAL: NEGATIVE for fever, chills, change in weight  ENT/MOUTH: NEGATIVE for ear, mouth and throat problems  RESP: NEGATIVE for significant cough or SOB  CV: NEGATIVE for chest pain, palpitations or peripheral edema    Patient Active Problem List    Diagnosis Date Noted     S/P hip replacement 04/15/2013     Priority: High     S/p right hip replacement with revision       Hip pain 12/04/2012     Priority: High     Congenital hip dysplasia.  Dr London Alegria @ [facility].       Chronic pain syndrome 07/15/2016     Priority: Medium     Chronic pain diagnosis: **  DIRE: score 14 initial date 9/27/16, most recent update 9/27/16     (14-21: may be a candidate for opioid therapy)  ORT:  score 10 initial date 9/27/16, most recent update 9/29/16    (Low Risk 0 - 3, Moderate Risk 4 - 7, High Risk > 8)  FAQ: baseline score 60/100, date 9/27/16, most recent update 9/29/16   Behavioral Health Consultation: date 9/29/16, provider, Nathaniel Lombardi, PhD  Personal Care Plan for Chronic Pain: initial date 9/29/16, most recent update 10/4/16   Reviewed in interprofessional team meeting:  initial date 7/14/16, most recent update 7/14/16    This patient has completed CPM assessment and has been deemed a poor candidate for opiate therapy at this time.  No opiates should be prescribed for this patient.   OR  Monthly medication(s): **, dose, # provided  Morphine equivalents = ** mg/ day   MME > 90 require review by CPM supervisory committee.   Date reviewed by oversight committee: ** or NA, Status: **  Opioid treatment agreement: initial date **, provider, **, date of most recent update **          DJD (degenerative joint disease), lumbar 01/05/2016     Priority: Medium     L4-5: Disc bulge and bilateral facet hypertrophy with mild bilateral  neural foramina narrowing, stable mild spinal canal narrowing.       Hypothyroidism 07/31/2014     Priority: Medium     S/P revision of total hip 05/21/2013     Priority: Medium     H/O: hysterectomy 05/09/2013     Priority: Medium     Rosacea 12/03/2012     Priority: Medium     Hx of ectopic pregnancy 05/09/2013     Priority: Low     Needed open abdominal surgery for ectopic pregnancies 1987, 1988. 1992  Had general    Had spinal with HIP REPLACEMENT         Arm fracture 05/09/2013     Priority: Low     Right, 1995       Other atopic dermatitis and related conditions 12/03/2012     Priority: Low     Contact dermatitis and other eczema, due to unspecified cause 12/03/2012     Priority: Low     Health Care Home 12/03/2012     Priority: Low     Tier Level: 1  DX V65.8 REPLACED WITH [id number]ALTH C[facility] HOME (04/08/2013)        Past Medical History:   Diagnosis Date     Arthritis     dysplagia right hip     Arthritis     back, neck     Disease of thyroid gland      Eczema     hands     Gastro-oesophageal reflux disease      GERD (gastroesophageal reflux disease)      History of blood transfusion      History of transfusion      Hypertension      Mumps      Palpitations      STD (sexually transmitted disease)      Thyroid disease      Past Surgical History:   Procedure Laterality Date     ABDOMEN SURGERY      cholecystectomy     APPENDECTOMY       APPENDECTOMY       ARTHROPLASTY REVISION HIP  5/21/2013    Procedure: ARTHROPLASTY REVISION HIP;  Right Total Hip Arthroplasty Revision   LATEX ALLERGY;  Surgeon: Fran Panda MD;  Location:  OR     CHOLECYSTECTOMY       CLOSED REDUCTION HIP Right 6/7/2018    Procedure: CLOSED REDUCTION, RIGHT HIP;  Surgeon: Salvatore Cosme MD;  Location: [facility];  Service:      ENT SURGERY      tonsillectomy     FRACTURE SURGERY       elbow and right hip     GENITOURINARY SURGERY      3 tubal pregnancies     GYN SURGERY       HYSTERECTOMY       HYSTERECTOMY  1992     JOINT REPLACEMENT      right hip 4 times     OOPHORECTOMY      One ovary     ORTHOPEDIC SURGERY      Right total hip ('98) & revision ('11) & hardware removal ('11)     ORTHOPEDIC SURGERY      Right elbow surgery x 3     TONSILLECTOMY       Current Outpatient Medications   Medication Sig Dispense Refill     alendronate (FOSAMAX) 70 MG tablet Take 1 tablet (70 mg) by mouth every 7 days 30 tablet 0     Buprenorphine HCl (BELBUCA) 300 MCG FILM buccal film Place 300 mcg inside cheek every 12 hours       Cholecalciferol (VITAMIN D) 400 UNITS capsule Take 1 capsule by mouth daily.       estradiol (ESTRACE VAGINAL) 0.1 MG/GM vaginal cream Apply small amount to the vaginal opening and urethra M, W, F @ h.s. 42.5 g 3     IBUPROFEN PO        levothyroxine (SYNTHROID/LEVOTHROID) 50 MCG tablet TAKE ONE TABLET BY MOUTH ONE TIME DAILY 90 tablet 0     lisinopril (ZESTRIL) 20 MG tablet TAKE ONE TABLET BY MOUTH ONE TIME DAILY   90 tablet 0     Multiple Vitamin (MULTI-VITAMIN PO) Take  by mouth.       MYRBETRIQ 25 MG 24 hr tablet Take 1 tablet (25 mg) by mouth daily 90 tablet 3     omeprazole (PRILOSEC) 40 MG DR capsule TAKE ONE CAPSULE BY MOUTH ONE TIME DAILY 90 capsule 0     oxyCODONE-acetaminophen (PERCOCET)  MG per tablet Take 1 tablet by mouth every 6 hours as needed for severe pain       diclofenac (VOLTAREN) 1 % topical gel Apply 4 g topically 2 times daily 100 g 1     mupirocin (BACTROBAN) 2 % external ointment Apply topically 3 times daily (Patient not taking: No sig reported) 30 g 3     OMEPRAZOLE PO        oxybutynin ER (DITROPAN-XL) 5 MG 24 hr tablet Take 1 tablet (5 mg) by mouth daily (Patient not taking: No sig reported) 90 tablet 1       Allergies   Allergen Reactions     Novocain [Procaine Hcl] Shortness Of Breath     Compazine      Feels like jaw is going out of socket     Latex  "Hives and Itching     Metoclopramide      \"dystonic reaction\"      Morphine Hcl Hives     Penicillins      Phenothiazines      \"dystonic reaction\"      Procaine      Pt denies allergy. Had tachycardia in dental office after local injecion, likely due to epinephrine.     Reglan Other (See Comments)     Joint issue with jaw, feels painful        Social History     Tobacco Use     Smoking status: Former Smoker     Packs/day: 0.00     Quit date: 2/15/1985     Years since quittin.3     Smokeless tobacco: Never Used   Substance Use Topics     Alcohol use: No     Family History   Problem Relation Age of Onset     Coronary Artery Disease Mother      Cancer Mother 50.00        Uterine, Esophageal, Urethra     Diabetes Father      Coronary Artery Disease Father      Cancer Father 65.00        Prostate     Heart Disease Father      Breast Cancer Paternal Aunt 60.00     History   Drug Use No         Objective     /74   Pulse 51   Temp 98.3  F (36.8  C) (Oral)   Resp 18   Wt 72.9 kg (160 lb 12.8 oz)   SpO2 98%   BMI 28.04 kg/m      Physical Exam  GENERAL APPEARANCE: healthy, alert and no distress  HENT: ear canals and TM's normal and nose and mouth without ulcers or lesions  RESP: lungs clear to auscultation - no rales, rhonchi or wheezes  CV: regular rate and rhythm, normal S1 S2, no S3 or S4 and no murmur, click or rub   ABDOMEN: soft, nontender, no HSM or masses and bowel sounds normal  NEURO: Normal strength and tone, sensory exam grossly normal, mentation intact and speech normal    Recent Labs   Lab Test 21  0431 20  1443   HGB  --  11.8    136.1   POTASSIUM 3.7 4.0   CR 0.78 0.8        Diagnostics:  No labs were ordered during this visit.   No EKG required for low risk surgery (cataract, skin procedure, breast biopsy, etc).    Revised Cardiac Risk Index (RCRI):  The patient has the following serious cardiovascular risks for perioperative complications:   - No serious cardiac risks = 0 " points     RCRI Interpretation: 0 points: Class I (very low risk - 0.4% complication rate)      Discussed with Dr. Jung.     Signed Electronically by: Edwige Reyes MD  Copy of this evaluation report is provided to requesting physician.

## 2022-06-09 ENCOUNTER — TRANSFERRED RECORDS (OUTPATIENT)
Dept: HEALTH INFORMATION MANAGEMENT | Facility: CLINIC | Age: 57
End: 2022-06-09
Payer: COMMERCIAL

## 2022-07-07 ENCOUNTER — TRANSFERRED RECORDS (OUTPATIENT)
Dept: HEALTH INFORMATION MANAGEMENT | Facility: CLINIC | Age: 57
End: 2022-07-07

## 2022-07-19 ENCOUNTER — OFFICE VISIT (OUTPATIENT)
Dept: FAMILY MEDICINE | Facility: CLINIC | Age: 57
End: 2022-07-19
Payer: COMMERCIAL

## 2022-07-19 ENCOUNTER — TRANSFERRED RECORDS (OUTPATIENT)
Dept: HEALTH INFORMATION MANAGEMENT | Facility: CLINIC | Age: 57
End: 2022-07-19

## 2022-07-19 VITALS
OXYGEN SATURATION: 100 % | HEART RATE: 58 BPM | SYSTOLIC BLOOD PRESSURE: 116 MMHG | TEMPERATURE: 99.2 F | RESPIRATION RATE: 16 BRPM | BODY MASS INDEX: 27.27 KG/M2 | WEIGHT: 156.4 LBS | DIASTOLIC BLOOD PRESSURE: 82 MMHG

## 2022-07-19 DIAGNOSIS — E03.9 HYPOTHYROIDISM, UNSPECIFIED TYPE: ICD-10-CM

## 2022-07-19 DIAGNOSIS — K21.9 GASTROESOPHAGEAL REFLUX DISEASE, UNSPECIFIED WHETHER ESOPHAGITIS PRESENT: ICD-10-CM

## 2022-07-19 DIAGNOSIS — I10 ESSENTIAL HYPERTENSION: ICD-10-CM

## 2022-07-19 PROCEDURE — 99214 OFFICE O/P EST MOD 30 MIN: CPT | Performed by: FAMILY MEDICINE

## 2022-07-19 RX ORDER — OMEPRAZOLE 40 MG/1
40 CAPSULE, DELAYED RELEASE ORAL DAILY
Qty: 90 CAPSULE | Refills: 0 | Status: SHIPPED | OUTPATIENT
Start: 2022-07-19 | End: 2022-07-25

## 2022-07-19 RX ORDER — LEVOTHYROXINE SODIUM 50 UG/1
50 TABLET ORAL DAILY
Qty: 90 TABLET | Refills: 0 | Status: SHIPPED | OUTPATIENT
Start: 2022-07-19 | End: 2022-10-17

## 2022-07-19 RX ORDER — LISINOPRIL 20 MG/1
20 TABLET ORAL DAILY
Qty: 90 TABLET | Refills: 0 | Status: SHIPPED | OUTPATIENT
Start: 2022-07-19 | End: 2022-10-17

## 2022-07-19 NOTE — PROGRESS NOTES
S: Kendra Chang is a 56 year old female who returns for follow up of reflux esophagitis. She has been taking PPIs and also H2 blockers which have helped but not completely relieved her symptoms. She is at risk for esophagitis because she has taken osteoporosis medications. She has not seen a GI Doctor for a while. At one time she had a scope which showed esophagitis. In addition she is taking NSAIDs because of her chronic pain issues.    Patients states that main concern today is a follow up on acid reflux.    PMHX/PSHX/MEDS/ALLERGIES/SHX/FHX reviewed and updated in Epic.      ROS:  General: No fevers, chills  Head: No headache  Ears: No acute change in hearing.    CV: No chest pain or palpitations.  Resp: No shortness of breath.  No cough. No hemoptysis.  GI: No nausea, vomiting, constipation, diarrhea  : No urinary pains    O: /82   Pulse 58   Temp 99.2  F (37.3  C) (Oral)   Resp 16   Wt 70.9 kg (156 lb 6.4 oz)   SpO2 100%   BMI 27.27 kg/m     Gen:  Well nourished and in NAD    CV:  RRR  - no murmurs, rubs, or gallups,   Pulm:  CTAB, no wheezes/rales/rhonchi, good air entry   ABD: soft, nontender, no masses, no rebound, BS intact throughout  Extrem: no cyanosis, edema or clubbing  Psych: Euthymic    Mild tenderness in the epigastric area.  (I10) Essential hypertension    Plan: lisinopril (ZESTRIL) 20 MG tablet      (E03.9) Hypothyroidism, unspecified type    Plan: levothyroxine (SYNTHROID/LEVOTHROID) 50 MCG         tablet           (K21.9) Gastroesophageal reflux disease, unspecified whether esophagitis present    Plan: omeprazole (PRILOSEC) 40 MG DR capsule, Adult         GI  Referral - Consult Only           RTC after GI referral    Elfego Tom MD

## 2022-07-19 NOTE — PATIENT INSTRUCTIONS
Meds refilled  GI consult done        07/21/22   GASTROENTEROLOGY REFERRAL  [facility]  Phone [phone]  Fax: [phone]    Online referral placed with [facility] who will contact patient to schedule.     Marifer Mcdaniel

## 2022-07-22 ENCOUNTER — TELEPHONE (OUTPATIENT)
Dept: GASTROENTEROLOGY | Facility: CLINIC | Age: 57
End: 2022-07-22

## 2022-07-22 NOTE — TELEPHONE ENCOUNTER
[facility]    Phone Message    May a detailed message be left on voicemail: Yes    Reason for Call: Other: Patient is currently scheduled on 11/25/2022, as a patient New GI Urgent. This is outside the expected timeline for this schedule. Paitent has been added to the waitlist.      Action Taken: Message routed to:  Other: GI REFERRAL TRIAGE POOL     Travel Screening: Not Applicable

## 2022-07-25 DIAGNOSIS — K21.9 GASTROESOPHAGEAL REFLUX DISEASE, UNSPECIFIED WHETHER ESOPHAGITIS PRESENT: ICD-10-CM

## 2022-07-25 RX ORDER — OMEPRAZOLE 40 MG/1
CAPSULE, DELAYED RELEASE ORAL
Qty: 90 CAPSULE | Refills: 0 | Status: SHIPPED | OUTPATIENT
Start: 2022-07-25 | End: 2022-10-17

## 2022-07-26 ENCOUNTER — TRANSFERRED RECORDS (OUTPATIENT)
Dept: HEALTH INFORMATION MANAGEMENT | Facility: CLINIC | Age: 57
End: 2022-07-26

## 2022-08-09 ENCOUNTER — TRANSFERRED RECORDS (OUTPATIENT)
Dept: HEALTH INFORMATION MANAGEMENT | Facility: CLINIC | Age: 57
End: 2022-08-09

## 2022-08-12 NOTE — TELEPHONE ENCOUNTER
Patient has switched to [facility] confirmed she wanted appointment scheduled with us cancelled and she confirmed.    Appointment scheduled 11/25/2022 cancelled.

## 2022-08-16 ENCOUNTER — OFFICE VISIT (OUTPATIENT)
Dept: FAMILY MEDICINE | Facility: CLINIC | Age: 57
End: 2022-08-16
Payer: COMMERCIAL

## 2022-08-16 VITALS
HEIGHT: 64 IN | BODY MASS INDEX: 26.73 KG/M2 | TEMPERATURE: 98.6 F | SYSTOLIC BLOOD PRESSURE: 124 MMHG | RESPIRATION RATE: 12 BRPM | DIASTOLIC BLOOD PRESSURE: 78 MMHG | OXYGEN SATURATION: 99 % | HEART RATE: 64 BPM | WEIGHT: 156.6 LBS

## 2022-08-16 DIAGNOSIS — Z79.899 ENCOUNTER FOR LONG-TERM (CURRENT) USE OF MEDICATIONS: ICD-10-CM

## 2022-08-16 DIAGNOSIS — Z11.59 NEED FOR HEPATITIS C SCREENING TEST: ICD-10-CM

## 2022-08-16 DIAGNOSIS — Z13.220 SCREENING FOR HYPERLIPIDEMIA: ICD-10-CM

## 2022-08-16 DIAGNOSIS — Z11.4 SCREENING FOR HIV (HUMAN IMMUNODEFICIENCY VIRUS): ICD-10-CM

## 2022-08-16 DIAGNOSIS — Z00.00 ENCOUNTER FOR SCREENING AND PREVENTATIVE CARE: Primary | ICD-10-CM

## 2022-08-16 DIAGNOSIS — Z12.31 VISIT FOR SCREENING MAMMOGRAM: ICD-10-CM

## 2022-08-16 LAB
CHOLEST SERPL-MCNC: 211 MG/DL
HBA1C MFR BLD: 5.4 % (ref 0–5.6)
HCV AB SERPL QL IA: NONREACTIVE
HDLC SERPL-MCNC: 48 MG/DL
HIV 1+2 AB+HIV1 P24 AG SERPL QL IA: NONREACTIVE
LDLC SERPL CALC-MCNC: 135 MG/DL
NONHDLC SERPL-MCNC: 163 MG/DL
TRIGL SERPL-MCNC: 139 MG/DL

## 2022-08-16 PROCEDURE — 87389 HIV-1 AG W/HIV-1&-2 AB AG IA: CPT | Performed by: FAMILY MEDICINE

## 2022-08-16 PROCEDURE — 99396 PREV VISIT EST AGE 40-64: CPT | Performed by: FAMILY MEDICINE

## 2022-08-16 PROCEDURE — 80061 LIPID PANEL: CPT | Performed by: FAMILY MEDICINE

## 2022-08-16 PROCEDURE — 87624 HPV HI-RISK TYP POOLED RSLT: CPT | Performed by: FAMILY MEDICINE

## 2022-08-16 PROCEDURE — 36415 COLL VENOUS BLD VENIPUNCTURE: CPT | Performed by: FAMILY MEDICINE

## 2022-08-16 PROCEDURE — 83036 HEMOGLOBIN GLYCOSYLATED A1C: CPT | Performed by: FAMILY MEDICINE

## 2022-08-16 PROCEDURE — 86803 HEPATITIS C AB TEST: CPT | Performed by: FAMILY MEDICINE

## 2022-08-16 PROCEDURE — G0145 SCR C/V CYTO,THINLAYER,RESCR: HCPCS | Performed by: FAMILY MEDICINE

## 2022-08-16 ASSESSMENT — ENCOUNTER SYMPTOMS
ARTHRALGIAS: 1
NAUSEA: 1
ABDOMINAL PAIN: 1

## 2022-08-16 NOTE — PROGRESS NOTES
SUBJECTIVE:   CC: Kendra Chang is an 57 year old woman who presents for preventive health visit.         Healthy Habits:     Getting at least 3 servings of Calcium per day:  Yes    Bi-annual eye exam:  Yes    Dental care twice a year:  NO    Sleep apnea or symptoms of sleep apnea:  None    Diet:  Breakfast skipped    Frequency of exercise:  4-5 days/week    Duration of exercise:  15-30 minutes    Taking medications regularly:  Yes    Medication side effects:  Other    PHQ-2 Total Score: 0    Additional concerns today:  No              Today's PHQ-2 Score:   PHQ-2 (  [employer]) 2022   Q1: Little interest or pleasure in doing things 0   Q2: Feeling down, depressed or hopeless 0   PHQ-2 Score 0   PHQ-2 Total Score (12-17 Years)- Positive if 3 or more points; Administer PHQ-A if positive -   Q1: Little interest or pleasure in doing things Not at all   Q2: Feeling down, depressed or hopeless Not at all   PHQ-2 Score 0       Abuse: Current or Past (Physical, Sexual or Emotional) - No  Do you feel safe in your environment? Yes    Have you ever done Advance Care Planning? (For example, a Health Directive, POLST, or a discussion with a medical provider or your loved ones about your wishes): No, advance care planning information given to patient to review.  Patient plans to discuss their wishes with loved ones or provider.      Social History     Tobacco Use     Smoking status: Former Smoker     Packs/day: 0.00     Quit date: 2/15/1985     Years since quittin.5     Smokeless tobacco: Never Used   Substance Use Topics     Alcohol use: No         Alcohol Use 2022   Prescreen: >3 drinks/day or >7 drinks/week? Not Applicable       Reviewed orders with patient.  Reviewed health maintenance and updated orders accordingly - Yes      Breast Cancer Screening:  Any new diagnosis of family breast, ovarian, or bowel cancer? No      Pertinent mammograms are reviewed under the imaging tab.    History of abnormal Pap  "smear: NO - age 30-65 PAP    Hx of hysterectomy-fibroids  Reviewed and updated as needed this visit by clinical staff   Tobacco  Allergies  Meds   Med Hx  Surg Hx  Fam Hx        Reviewed and updated as needed this visit by Provider                       Review of Systems   Gastrointestinal: Positive for abdominal pain and nausea.   Musculoskeletal: Positive for arthralgias.     CONSTITUTIONAL: NEGATIVE for fever, chills, change in weight  INTEGUMENTARU/SKIN: NEGATIVE for worrisome rashes, moles or lesions  EYES: NEGATIVE for vision changes or irritation  ENT: NEGATIVE for ear, mouth and throat problems  RESP: NEGATIVE for significant cough or SOB  BREAST: NEGATIVE for masses, tenderness or discharge  CV: NEGATIVE for chest pain, palpitations or peripheral edema  GI: NEGATIVE for nausea, abdominal pain, heartburn, or change in bowel habits  : NEGATIVE for unusual urinary or vaginal symptoms. Periods are regular.  MUSCULOSKELETAL: NEGATIVE for significant arthralgias or myalgia  NEURO: NEGATIVE for weakness, dizziness or paresthesias  PSYCHIATRIC: NEGATIVE for changes in mood or affect     OBJECTIVE:   /78 (BP Location: Left arm, Patient Position: Sitting, Cuff Size: Adult Regular)   Pulse 64   Temp 98.6  F (37  C) (Oral)   Resp 12   Ht 1.619 m (5' 3.75\")   Wt 71 kg (156 lb 9.6 oz)   SpO2 99%   BMI 27.09 kg/m    Physical Exam  GENERAL: healthy, alert and no distress  NECK: no adenopathy, no asymmetry, masses, or scars and thyroid normal to palpation  RESP: lungs clear to auscultation - no rales, rhonchi or wheezes  CV: regular rate and rhythm, normal S1 S2, no S3 or S4, no murmur, click or rub, no peripheral edema and peripheral pulses strong  ABDOMEN: soft, nontender, no hepatosplenomegaly, no masses and bowel sounds normal  MS: no gross musculoskeletal defects noted, no edema    Diagnostic Lipids  ASSESSMENT/PLAN:    Normal exam   osteoporosis   Hysterectomy    Hip replacement/and revisions   " "Chronic pain management   Reflux  Hypothyroidism  COUNSELING:  Reviewed preventive health counseling, as reflected in patient instructions       Osteoporosis prevention/bone health    Estimated body mass index is 27.09 kg/m  as calculated from the following:    Height as of this encounter: 1.619 m (5' 3.75\").    Weight as of this encounter: 71 kg (156 lb 9.6 oz).          C    Elfego Tom MD  [facility]  "

## 2022-08-16 NOTE — LETTER
August 17, 2022      Gwendolyn Chang  [address] [facility] [address]        Dear ,    We are writing to inform you of your test results.    No diabetes by test   Follow-up in one year.  No hep C, that's good.       Resulted Orders   Hemoglobin A1c   Result Value Ref Range    Hemoglobin A1C 5.4 0.0 - 5.6 %      Comment:      Normal <5.7%   Prediabetes 5.7-6.4%    Diabetes 6.5% or higher     Note: Adopted from ADA consensus guidelines.       If you have any questions or concerns, please call the clinic at the number listed above.       Sincerely,      Elfego Tom MD

## 2022-08-16 NOTE — LETTER
August 24, 2022       Gwendolyn ERICA Charlene  [address] [facility] [address]        Dear Ms.Charlene,    We are writing to inform you of your test results.    My name is Dr. Jiménez and I am covering for Dr. Tom this week.  Your pap smear results have come back and they are all normal, including your HPV testing.  This is good news.  You are due for your next (and last!) pap smear in 5 years.  The rest of your lab tests look good as well.  Please call the clinic at [phone] if you have any questions.         Resulted Orders   Hemoglobin A1c   Result Value Ref Range    Hemoglobin A1C 5.4 0.0 - 5.6 %      Comment:      Normal <5.7%   Prediabetes 5.7-6.4%    Diabetes 6.5% or higher     Note: Adopted from ADA consensus guidelines.   Lipid panel reflex to direct LDL Non-fasting   Result Value Ref Range    Cholesterol 211 (H) <200 mg/dL    Triglycerides 139 <150 mg/dL    Direct Measure HDL 48 (L) >=50 mg/dL    LDL Cholesterol Calculated 135 (H) <=100 mg/dL    Non HDL Cholesterol 163 (H) <130 mg/dL    Narrative    Cholesterol  Desirable:  <200 mg/dL    Triglycerides  Normal:  Less than 150 mg/dL  Borderline High:  150-199 mg/dL  High:  200-499 mg/dL  Very High:  Greater than or equal to 500 mg/dL    Direct Measure HDL  Female:  Greater than or equal to 50 mg/dL   Male:  Greater than or equal to 40 mg/dL    LDL Cholesterol  Desirable:  <100mg/dL  Above Desirable:  100-129 mg/dL   Borderline High:  130-159 mg/dL   High:  160-189 mg/dL   Very High:  >= 190 mg/dL    Non HDL Cholesterol  Desirable:  130 mg/dL  Above Desirable:  130-159 mg/dL  Borderline High:  160-189 mg/dL  High:  190-219 mg/dL  Very High:  Greater than or equal to 220 mg/dL   Hepatitis C Screen Reflex to HCV RNA Quant and Genotype   Result Value Ref Range    Hepatitis C Antibody Nonreactive Nonreactive    Narrative    Assay performance characteristics have not been established for newborns, infants, and children.   HIV Screening   Result Value Ref  Range    HIV Antigen Antibody Combo Nonreactive Nonreactive      Comment:      HIV-1 p24 Ag & HIV-1/HIV-2 Ab Not Detected   Gynecologic Cytology (PAP)   Result Value Ref Range    Interpretation        Negative for Intraepithelial Lesion or Malignancy (NILM)    Comment         Papanicolaou Test Limitations:  Cervical cytology is a screening test with limited sensitivity, and regular screening is critical for cancer prevention.  Pap tests are primarily effective for the diagnosis/prevention of squamous cell carcinoma, not adenocarcinoma or other cancers.        Specimen Adequacy       Satisfactory for evaluation, endocervical/transformation zone component absent    Clinical Information       complete hysterectomy      LMP/Menopause Date       [1998/hysterectomy      Reflex Testing Yes regardless of result     Previous Abnormal?       No      Performing Labs       The technical component of this testing was completed at [facility]     HPV High Risk Types DNA Cervical   Result Value Ref Range    Other HR HPV Negative Negative    HPV16 DNA Negative Negative    HPV18 DNA Negative Negative    FINAL DIAGNOSIS       This patient's sample is negative for HPV DNA.        This test was developed and its performance characteristics determined by the [facility]. It has not been cleared or approved by the FDA. The laboratory is regulated under CLIA as qualified to perform high-complexity testing. This test is used for clinical purposes. It should not be regarded as investigational or for research.    METHODOLOGY: The Roche Davis 4800 system uses automated extraction, simultaneous amplification of HPV (L1 region) and beta-globin, followed by real time detection of fluorescent labeled HPV and beta globin using specific oligonucleotide probes. The test specifically identified types HPV 16 DNA and HPV 18 DNA  while concurrently detecting the rest of the high risk types (31, 33, 35, 39, 45, 51, 52, 56, 58, 59, 66 or 68).    COMMENTS: This test is not intended for use as a screening device for woman under age 30 with normal cervical cytology. Results should be correlated with cytologic and histologic findings. Close clinical followup is recommended.           If you have any questions or concerns, please call the clinic at the number listed above.       Sincerely,      Elfego Tom MD

## 2022-08-19 LAB
BKR LAB AP GYN ADEQUACY: NORMAL
BKR LAB AP GYN INTERPRETATION: NORMAL
BKR LAB AP HPV REFLEX: NORMAL
BKR LAB AP LMP: NORMAL
BKR LAB AP PREVIOUS ABNORMAL: NORMAL
PATH REPORT.COMMENTS IMP SPEC: NORMAL
PATH REPORT.COMMENTS IMP SPEC: NORMAL
PATH REPORT.RELEVANT HX SPEC: NORMAL

## 2022-08-22 LAB
HUMAN PAPILLOMA VIRUS 16 DNA: NEGATIVE
HUMAN PAPILLOMA VIRUS 18 DNA: NEGATIVE
HUMAN PAPILLOMA VIRUS FINAL DIAGNOSIS: NORMAL
HUMAN PAPILLOMA VIRUS OTHER HR: NEGATIVE

## 2022-08-24 NOTE — RESULT ENCOUNTER NOTE
Kendra Chang-    My name is Dr. Jiménez and I am covering for Dr. Tom this week.  Your pap smear results have come back and they are all normal, including your HPV testing.  This is good news.  You are due for your next (and last!) pap smear in 5 years.  The rest of your lab tests look good as well.  Please call the clinic at [phone] if you have any questions.      Lashanda Jiménez MD    Please send results to patient.

## 2022-09-07 ENCOUNTER — TRANSFERRED RECORDS (OUTPATIENT)
Dept: HEALTH INFORMATION MANAGEMENT | Facility: CLINIC | Age: 57
End: 2022-09-07

## 2022-09-14 ENCOUNTER — TRANSFERRED RECORDS (OUTPATIENT)
Dept: HEALTH INFORMATION MANAGEMENT | Facility: CLINIC | Age: 57
End: 2022-09-14

## 2022-09-21 ENCOUNTER — TRANSFERRED RECORDS (OUTPATIENT)
Dept: HEALTH INFORMATION MANAGEMENT | Facility: CLINIC | Age: 57
End: 2022-09-21

## 2022-10-05 ENCOUNTER — TRANSFERRED RECORDS (OUTPATIENT)
Dept: HEALTH INFORMATION MANAGEMENT | Facility: CLINIC | Age: 57
End: 2022-10-05

## 2022-10-16 ENCOUNTER — HEALTH MAINTENANCE LETTER (OUTPATIENT)
Age: 57
End: 2022-10-16

## 2022-10-16 DIAGNOSIS — E03.9 HYPOTHYROIDISM, UNSPECIFIED TYPE: ICD-10-CM

## 2022-10-16 DIAGNOSIS — I10 ESSENTIAL HYPERTENSION: ICD-10-CM

## 2022-10-17 DIAGNOSIS — K21.9 GASTROESOPHAGEAL REFLUX DISEASE, UNSPECIFIED WHETHER ESOPHAGITIS PRESENT: ICD-10-CM

## 2022-10-17 RX ORDER — OMEPRAZOLE 40 MG/1
CAPSULE, DELAYED RELEASE ORAL
Qty: 90 CAPSULE | Refills: 0 | Status: SHIPPED | OUTPATIENT
Start: 2022-10-17 | End: 2022-11-01

## 2022-10-17 RX ORDER — LISINOPRIL 20 MG/1
20 TABLET ORAL DAILY
Qty: 90 TABLET | Refills: 0 | Status: SHIPPED | OUTPATIENT
Start: 2022-10-17 | End: 2023-01-20

## 2022-10-17 RX ORDER — LEVOTHYROXINE SODIUM 50 UG/1
TABLET ORAL
Qty: 90 TABLET | Refills: 0 | Status: SHIPPED | OUTPATIENT
Start: 2022-10-17 | End: 2023-01-20

## 2022-10-27 DIAGNOSIS — K21.9 GASTROESOPHAGEAL REFLUX DISEASE, UNSPECIFIED WHETHER ESOPHAGITIS PRESENT: ICD-10-CM

## 2022-11-01 RX ORDER — OMEPRAZOLE 40 MG/1
CAPSULE, DELAYED RELEASE ORAL
Qty: 90 CAPSULE | Refills: 0 | Status: SHIPPED | OUTPATIENT
Start: 2022-11-01 | End: 2023-04-19

## 2022-11-04 ENCOUNTER — TRANSFERRED RECORDS (OUTPATIENT)
Dept: HEALTH INFORMATION MANAGEMENT | Facility: CLINIC | Age: 57
End: 2022-11-04

## 2022-12-02 ENCOUNTER — TRANSFERRED RECORDS (OUTPATIENT)
Dept: HEALTH INFORMATION MANAGEMENT | Facility: CLINIC | Age: 57
End: 2022-12-02

## 2023-01-05 ENCOUNTER — TRANSFERRED RECORDS (OUTPATIENT)
Dept: HEALTH INFORMATION MANAGEMENT | Facility: CLINIC | Age: 58
End: 2023-01-05

## 2023-01-20 DIAGNOSIS — E03.9 HYPOTHYROIDISM, UNSPECIFIED TYPE: ICD-10-CM

## 2023-01-20 DIAGNOSIS — I10 ESSENTIAL HYPERTENSION: ICD-10-CM

## 2023-01-20 RX ORDER — LEVOTHYROXINE SODIUM 50 UG/1
TABLET ORAL
Qty: 90 TABLET | Refills: 0 | Status: SHIPPED | OUTPATIENT
Start: 2023-01-20 | End: 2023-04-20

## 2023-01-20 RX ORDER — LISINOPRIL 20 MG/1
20 TABLET ORAL DAILY
Qty: 90 TABLET | Refills: 0 | Status: SHIPPED | OUTPATIENT
Start: 2023-01-20 | End: 2023-04-19

## 2023-01-20 NOTE — TELEPHONE ENCOUNTER
Pt would like a refill on both of these medications please                    Thank you.           Aliza Wong CMA-ANNETTE

## 2023-01-31 ENCOUNTER — TRANSFERRED RECORDS (OUTPATIENT)
Dept: HEALTH INFORMATION MANAGEMENT | Facility: CLINIC | Age: 58
End: 2023-01-31
Payer: COMMERCIAL

## 2023-03-02 ENCOUNTER — TRANSFERRED RECORDS (OUTPATIENT)
Dept: HEALTH INFORMATION MANAGEMENT | Facility: CLINIC | Age: 58
End: 2023-03-02

## 2023-03-02 ENCOUNTER — OFFICE VISIT (OUTPATIENT)
Dept: FAMILY MEDICINE | Facility: CLINIC | Age: 58
End: 2023-03-02
Payer: COMMERCIAL

## 2023-03-02 VITALS
HEART RATE: 70 BPM | HEIGHT: 65 IN | DIASTOLIC BLOOD PRESSURE: 69 MMHG | BODY MASS INDEX: 23.86 KG/M2 | SYSTOLIC BLOOD PRESSURE: 103 MMHG | OXYGEN SATURATION: 95 % | WEIGHT: 143.2 LBS | RESPIRATION RATE: 20 BRPM | TEMPERATURE: 98.2 F

## 2023-03-02 DIAGNOSIS — J06.9 UPPER RESPIRATORY TRACT INFECTION, UNSPECIFIED TYPE: Primary | ICD-10-CM

## 2023-03-02 PROCEDURE — 99213 OFFICE O/P EST LOW 20 MIN: CPT | Mod: GC

## 2023-03-02 NOTE — PATIENT INSTRUCTIONS
It was nice to meet you today:     - Try over the counter nasal spray: Flonase (fluticasone)   - Continue your other nasal spray, ibuprofen for inflammation, Tylenol, hot water/tea with lemon and honey, humidifier  - Stay hydrated, get lots of rest   - Finish your antibiotic    Come back next week if not better.

## 2023-03-02 NOTE — PROGRESS NOTES
Preceptor Attestation:   Patient seen, evaluated and discussed with the resident. I have verified the content of the note, which accurately reflects my assessment of the patient and the plan of care.   Supervising Physician:  Mike Herbert MD

## 2023-03-02 NOTE — PROGRESS NOTES
Assessment & Plan   57-year-old female presenting for follow-up after she was diagnosed with atypical pneumonia at the urgent care on February 24.  Still experiencing URI symptoms of cough, congestion, fatigue.  Symptom onset about 7 days ago.      Upper respiratory tract infection, unspecified type  Was diagnosed with atypical pneumonia at the urgent care on February 24.  Still experiencing URI symptoms of cough, congestion, fatigue.  Symptom onset about 7 days ago.  Denies chronic cough or chronic respiratory symptoms, COPD seems less likely due to acute onset.  Did have strep exposure, however doxycycline would cover strep.  Strep seems less likely given no improvement with antibiotics, no sore throat, no fever, no anterior lymphadenopathy, no erythematous oropharynx, and presence of a cough.  Given timing of symptoms for about 7 days now, suspect symptoms could be viral.  -Advised patient to finish doxycycline, last day tomorrow 3/3  -Discussed supportive cares  -Can trial OTC Flonase  -Follow-up in 1 week if no improvement in symptoms, return precautions discussed   -Patient agreeable to the plan      Return if symptoms worsen or fail to improve.    Mildred Cortez DO PGY1  [facility]    Ken Snow is a 57 year old, presenting for the following health issues:  other  (Recheck on pneumonia and follow-up hospital)      HPI   Gwendolyn is a 57-year-old female with PMH hypothyroidism, chronic pain who presents to the clinic today for follow-up URI. Symptoms started Wed/Thurs of last week (about 7 days ago). Seen in  1 week ago on 2/24.  COVID and flu at urgent care were both negative.  Chest x-ray at urgent care did not show any infectious signs or abnormalities.  She was diagnosed at urgent care with atypical pneumonia and given a 7-day course of doxycycline and albuterol for her symptoms.  Tomorrow will be last day of doxycycline. Tolerating the antibiotic without significant side  "effects.  Has tried albuterol but it has not really helped her symptoms.  She has noted perhaps slight improvement in symptoms, but she is coming in because she thought she would feel better by now.  Feeling congested, with a cough, chest heavings, eating less, no ear pain.  No fevers, chills.  She does endorse fatigue.  No sore throat.  She reports that her cough is new and started 1 week ago.      Grandson had strep recently (a few weeks ago).     Prior to last week, breathing, energy level were normal. Prior to last week she has not had a cough or other respiratory symptoms    Review of Systems   Constitutional, HEENT, cardiovascular, pulmonary, gi and gu systems are negative, except as otherwise noted.      Objective    /69   Pulse 70   Temp 98.2  F (36.8  C) (Oral)   Resp 20   Ht 1.651 m (5' 5\")   Wt 65 kg (143 lb 3.2 oz)   SpO2 95%   BMI 23.83 kg/m    Body mass index is 23.83 kg/m .  Physical Exam   GENERAL: healthy, alert and no distress  HENT: normal cephalic/atraumatic, nasal mucosa boggy and congested, mouth without ulcers or lesions, oropharynx clear and oral mucous membranes moist  NECK: no adenopathy, no asymmetry, no masses  RESP: lungs clear to auscultation - no rales, rhonchi or wheezes  CV: regular rate and rhythm, normal S1 S2, no S3 or S4, no murmur, click or rub, no peripheral edema and peripheral pulses strong  ABDOMEN: soft, nontender, no hepatosplenomegaly, no masses and bowel sounds normal  MS: no gross musculoskeletal defects noted, no edema      ----- Service Performed and Documented by Resident or Fellow ------            "

## 2023-04-04 ENCOUNTER — TRANSFERRED RECORDS (OUTPATIENT)
Dept: HEALTH INFORMATION MANAGEMENT | Facility: CLINIC | Age: 58
End: 2023-04-04
Payer: COMMERCIAL

## 2023-04-19 DIAGNOSIS — I10 ESSENTIAL HYPERTENSION: ICD-10-CM

## 2023-04-19 DIAGNOSIS — K21.9 GASTROESOPHAGEAL REFLUX DISEASE, UNSPECIFIED WHETHER ESOPHAGITIS PRESENT: ICD-10-CM

## 2023-04-19 RX ORDER — LISINOPRIL 20 MG/1
20 TABLET ORAL DAILY
Qty: 90 TABLET | Refills: 0 | Status: SHIPPED | OUTPATIENT
Start: 2023-04-19 | End: 2024-07-01

## 2023-04-19 RX ORDER — OMEPRAZOLE 40 MG/1
CAPSULE, DELAYED RELEASE ORAL
Qty: 90 CAPSULE | Refills: 0 | Status: SHIPPED | OUTPATIENT
Start: 2023-04-19 | End: 2023-08-02

## 2023-04-19 NOTE — TELEPHONE ENCOUNTER
omeprazole (PRILOSEC) 40 MG DR capsule      lisinopril (ZESTRIL) 20 MG tablet            Pt is requesting a few refills please                     Aliza Wong CMA-AAMA

## 2023-04-20 DIAGNOSIS — E03.9 HYPOTHYROIDISM, UNSPECIFIED TYPE: ICD-10-CM

## 2023-04-20 RX ORDER — LEVOTHYROXINE SODIUM 50 UG/1
TABLET ORAL
Qty: 90 TABLET | Refills: 0 | Status: SHIPPED | OUTPATIENT
Start: 2023-04-20 | End: 2023-08-02

## 2023-04-25 ENCOUNTER — TRANSFERRED RECORDS (OUTPATIENT)
Dept: HEALTH INFORMATION MANAGEMENT | Facility: CLINIC | Age: 58
End: 2023-04-25
Payer: COMMERCIAL

## 2023-07-05 ENCOUNTER — TRANSFERRED RECORDS (OUTPATIENT)
Dept: HEALTH INFORMATION MANAGEMENT | Facility: CLINIC | Age: 58
End: 2023-07-05
Payer: COMMERCIAL

## 2023-07-25 DIAGNOSIS — K21.9 GASTROESOPHAGEAL REFLUX DISEASE, UNSPECIFIED WHETHER ESOPHAGITIS PRESENT: ICD-10-CM

## 2023-07-27 DIAGNOSIS — E03.9 HYPOTHYROIDISM, UNSPECIFIED TYPE: ICD-10-CM

## 2023-08-01 ENCOUNTER — TRANSFERRED RECORDS (OUTPATIENT)
Dept: HEALTH INFORMATION MANAGEMENT | Facility: CLINIC | Age: 58
End: 2023-08-01
Payer: COMMERCIAL

## 2023-08-02 RX ORDER — OMEPRAZOLE 40 MG/1
CAPSULE, DELAYED RELEASE ORAL
Qty: 90 CAPSULE | Refills: 0 | Status: SHIPPED | OUTPATIENT
Start: 2023-08-02

## 2023-08-02 RX ORDER — LEVOTHYROXINE SODIUM 50 UG/1
TABLET ORAL
Qty: 90 TABLET | Refills: 0 | Status: SHIPPED | OUTPATIENT
Start: 2023-08-02 | End: 2023-11-11

## 2023-08-03 ENCOUNTER — TELEPHONE (OUTPATIENT)
Dept: FAMILY MEDICINE | Facility: CLINIC | Age: 58
End: 2023-08-03

## 2023-08-03 ENCOUNTER — OFFICE VISIT (OUTPATIENT)
Dept: FAMILY MEDICINE | Facility: CLINIC | Age: 58
End: 2023-08-03
Payer: COMMERCIAL

## 2023-08-03 VITALS
SYSTOLIC BLOOD PRESSURE: 99 MMHG | BODY MASS INDEX: 23.86 KG/M2 | WEIGHT: 143.4 LBS | DIASTOLIC BLOOD PRESSURE: 65 MMHG | OXYGEN SATURATION: 98 % | HEART RATE: 67 BPM

## 2023-08-03 DIAGNOSIS — K21.9 GASTROESOPHAGEAL REFLUX DISEASE, UNSPECIFIED WHETHER ESOPHAGITIS PRESENT: Primary | ICD-10-CM

## 2023-08-03 PROCEDURE — 99214 OFFICE O/P EST MOD 30 MIN: CPT | Mod: GC

## 2023-08-03 RX ORDER — ONDANSETRON 4 MG/1
4 TABLET, ORALLY DISINTEGRATING ORAL EVERY 8 HOURS PRN
Qty: 30 TABLET | Refills: 1 | Status: SHIPPED | OUTPATIENT
Start: 2023-08-03 | End: 2024-09-26

## 2023-08-03 RX ORDER — CALCIUM CARBONATE 500 MG/1
1 TABLET, CHEWABLE ORAL 2 TIMES DAILY
Qty: 60 TABLET | Refills: 1 | Status: SHIPPED | OUTPATIENT
Start: 2023-08-03

## 2023-08-03 NOTE — PROGRESS NOTES
Assessment & Plan     Gastroesophageal reflux disease, unspecified whether esophagitis present  Patient presentation consistent with exacerbation of GERD complicated by hiatal hernia as well as stomach ulceration seen on [facility] EGD report from 9/16/2022. History consistent with this as it is worsened with NSAIDS and eating as well as lying down. Improved with antacids and PPI. Low concern for emergent abdominal pathology such as volvulus of her hernia given no guarding, rebouind tenderness or pain out of proportion on exam. Mild epigastric tenderness on exam no hepatomegaly. No gall bladder present. No imaging indicated at this time as likely low diagnostic yield.     Patient should follow up with GI and may need a follow up EGD vs general surgery consult for hernia repair.     - calcium carbonate (TUMS) 500 MG chewable tablet; Take 1 tablet (500 mg) by mouth 2 times daily  - ondansetron (ZOFRAN ODT) 4 MG ODT tab; Take 1 tablet (4 mg) by mouth every 8 hours as needed for nausea  Avoid NSAIDS, alcohol, acidic foods as this will worsen the GERD and the stomach ulcer/gastropathy pain  Continue Omeprazole  Continue Antacids (TUMS)  Prescribed zofran for nausea  Follow up as scheduled with GI  6.   Return to clinic in 3 months    Review of prior external note(s) from - [facility]where information from [facility] reviewed  Review of external notes as documented elsewhere in note  Prescription drug management  No LOS data to display   Time spent by me doing chart review, history and exam, documentation and further activities per the note       MEDICATIONS:  Continue current medications without change  FUTURE APPOINTMENTS:       - Follow-up visit in August with GI    Return in about 3 months (around 11/3/2023) for Follow up, with me, with any available provider.    Gary Bravo MD  [facility]    Ken Snow is a 58 year old, presenting for the following health issues:  Abdominal Pain (For a couple  of weeks- started off as gas, she did take gas meds now its burning and pain )        8/3/2023    10:41 AM   Additional Questions   Roomed by ngf   Accompanied by self         8/3/2023    10:41 AM   Patient Reported Additional Medications   Patient reports taking the following new medications none       HPI   59 y/o F hx chronic pain of right hip and low back (follows with pain clinic), hypothyroidism, HTN, GERD, hiatial hernia s/p egd in 9/22 comes in with CC epigastric pain.  2 weeks ago had some stomach pain with worseing on tueday. Can't eat can't lay down because that makes it worse. Feels burning and achy. Has never felt like this before. Pain 10/10 at worst, right now is 3/10. Epigastric location, constant however intensity waxes and wanes. Last BM 2 days ago which is her baseline to have a BM q2days since she is on chronic Percocet and buprenorphine.  Endorses Early satiety, nausea, heartburn, 15 pound weight loss over the last 2 weeks, decreased PO intake secondary to pain  Pain improved with antacids. Pain worsened with NSAIDS. On Omeprazole 40 mg daily  Denies blood in stool or hematemsis, fevers, chills, headaches, SOB, new back pain, dysphagia or odynophagia, cough, hoarseness.  Has follow up with GI aug 16 virtually. Last egd in Sept 2022 showed medium hiatal hernia with nonerosive gastropathy and ulceration of stomach as well as negative H pylori   5 pack years smoking hx. No etoh use    Mom had esophageal ca   and grandaughter live at home. Works 2 days per week    Surg Hx  lap cholecystectomy, hysterectomy, bilateral oopherectomy, right hip replacement      Review of Systems   Constitutional, HEENT, cardiovascular, pulmonary, gi and gu systems are negative, except as otherwise noted.      Objective    BP 99/65 (BP Location: Left arm, Patient Position: Sitting, Cuff Size: Adult Regular)   Pulse 67   Wt 65 kg (143 lb 6.4 oz)   SpO2 98%   Breastfeeding No   BMI 23.86 kg/m    Body mass index  is 23.86 kg/m .  Physical Exam   GENERAL: healthy, alert and no distress  EYES: Eyes grossly normal to inspection, PERRL and conjunctivae and sclerae normal  NECK: no adenopathy, no asymmetry, masses, or scars and thyroid normal to palpation  RESP: lungs clear to auscultation - no rales, rhonchi or wheezes  CV: regular rate and rhythm, normal S1 S2, no S3 or S4, no murmur, click or rub, no peripheral edema and peripheral pulses strong  ABDOMEN: tenderness epigastric, no organomegaly or masses, bowel sounds normal, and no palpable or pulsatile masses  MS: no gross musculoskeletal defects noted, no edema  SKIN: no suspicious lesions or rashes  NEURO: Normal strength and tone, mentation intact and speech normal  PSYCH: mentation appears normal, affect normal/bright    Office Visit on 08/16/2022   Component Date Value Ref Range Status    Hemoglobin A1C 08/16/2022 5.4  0.0 - 5.6 % Final    Normal <5.7%   Prediabetes 5.7-6.4%    Diabetes 6.5% or higher     Note: Adopted from ADA consensus guidelines.    Cholesterol 08/16/2022 211 (H)  <200 mg/dL Final    Triglycerides 08/16/2022 139  <150 mg/dL Final    Direct Measure HDL 08/16/2022 48 (L)  >=50 mg/dL Final    LDL Cholesterol Calculated 08/16/2022 135 (H)  <=100 mg/dL Final    Non HDL Cholesterol 08/16/2022 163 (H)  <130 mg/dL Final    Hepatitis C Antibody 08/16/2022 Nonreactive  Nonreactive Final    HIV Antigen Antibody Combo 08/16/2022 Nonreactive  Nonreactive Final    HIV-1 p24 Ag & HIV-1/HIV-2 Ab Not Detected    Interpretation 08/16/2022 Negative for Intraepithelial Lesion or Malignancy (NILM)    Final    Comment 08/16/2022    Final                    Value:This result contains rich text formatting which cannot be displayed here.    Specimen Adequacy 08/16/2022 Satisfactory for evaluation, endocervical/transformation zone component absent   Final    Clinical Information 08/16/2022    Final                    Value:This result contains rich text formatting which cannot  be displayed here.    LMP/Menopause Date 08/16/2022    Final                    Value:This result contains rich text formatting which cannot be displayed here.    Reflex Testing 08/16/2022 Yes regardless of result   Final    Previous Abnormal? 08/16/2022    Final                    Value:This result contains rich text formatting which cannot be displayed here.    Performing Labs 08/16/2022    Final                    Value:This result contains rich text formatting which cannot be displayed here.    Other HR HPV 08/16/2022 Negative  Negative Final    HPV16 DNA 08/16/2022 Negative  Negative Final    HPV18 DNA 08/16/2022 Negative  Negative Final    FINAL DIAGNOSIS 08/16/2022    Final                    Value:This result contains rich text formatting which cannot be displayed here.       Items personally reviewed/procedural attestation: vitals and labs.    Gary Bravo MD

## 2023-08-03 NOTE — TELEPHONE ENCOUNTER
ondansetron (ZOFRAN ODT) 4 MG ODT tab         Prior Authorization Specialty Medication Request    Medication/Dose: 4 mg  ICD code (if different than what is on RX):  n/a  Previously Tried and Failed:  n/a    Important Lab Values: n/a  Rationale: n/a    Insurance Name: [employer]  Insurance ID: [id number]   Insurance Phone Number:     Pharmacy Information (if different than what is on RX)  Name:  [facility] [place]  Phone: [phone]            Aliza Wong [facility]

## 2023-08-03 NOTE — PATIENT INSTRUCTIONS
Plan:  Avoid NSAIDS, alcohol, acidic foods as this will worsen the GERD and the stomach ulcer/gastropathy pain  Continue Omeprazole  Continue Antacids (TUMS)  Prescribed zofran for nausea  Follow up as scheduled with GI  6.   Return to clinic in 3 months

## 2023-08-08 NOTE — TELEPHONE ENCOUNTER
Dr. Bravo.     No need for the PA- her plan covers the medications.  See the message below.    I'm going to close the encounter.    See you next week when I get back.          Aliza CASON

## 2023-08-16 ENCOUNTER — TRANSFERRED RECORDS (OUTPATIENT)
Dept: HEALTH INFORMATION MANAGEMENT | Facility: CLINIC | Age: 58
End: 2023-08-16
Payer: COMMERCIAL

## 2023-08-22 ENCOUNTER — TRANSFERRED RECORDS (OUTPATIENT)
Dept: HEALTH INFORMATION MANAGEMENT | Facility: CLINIC | Age: 58
End: 2023-08-22
Payer: COMMERCIAL

## 2023-08-26 ENCOUNTER — HEALTH MAINTENANCE LETTER (OUTPATIENT)
Age: 58
End: 2023-08-26

## 2023-08-31 ENCOUNTER — TRANSFERRED RECORDS (OUTPATIENT)
Dept: HEALTH INFORMATION MANAGEMENT | Facility: CLINIC | Age: 58
End: 2023-08-31
Payer: COMMERCIAL

## 2023-10-02 ENCOUNTER — TRANSFERRED RECORDS (OUTPATIENT)
Dept: HEALTH INFORMATION MANAGEMENT | Facility: CLINIC | Age: 58
End: 2023-10-02
Payer: COMMERCIAL

## 2023-10-03 ENCOUNTER — TRANSFERRED RECORDS (OUTPATIENT)
Dept: HEALTH INFORMATION MANAGEMENT | Facility: CLINIC | Age: 58
End: 2023-10-03
Payer: COMMERCIAL

## 2023-10-13 ENCOUNTER — TRANSFERRED RECORDS (OUTPATIENT)
Dept: HEALTH INFORMATION MANAGEMENT | Facility: CLINIC | Age: 58
End: 2023-10-13
Payer: COMMERCIAL

## 2023-10-13 LAB
ALT SERPL-CCNC: 19 IU/L (ref 0–32)
AST SERPL-CCNC: 34 IU/L (ref 0–40)
CREATININE (EXTERNAL): 0.92 MG/DL (ref 0.57–1)
GFR ESTIMATED (EXTERNAL): 71 ML/MIN/1.73
GLUCOSE (EXTERNAL): 91 MG/DL (ref 70–99)
POTASSIUM (EXTERNAL): 4.2 MMOL/L (ref 3.5–5.2)

## 2023-11-02 ENCOUNTER — TRANSFERRED RECORDS (OUTPATIENT)
Dept: HEALTH INFORMATION MANAGEMENT | Facility: CLINIC | Age: 58
End: 2023-11-02
Payer: COMMERCIAL

## 2023-11-04 ENCOUNTER — HEALTH MAINTENANCE LETTER (OUTPATIENT)
Age: 58
End: 2023-11-04

## 2023-11-15 ENCOUNTER — OFFICE VISIT (OUTPATIENT)
Dept: FAMILY MEDICINE | Facility: CLINIC | Age: 58
End: 2023-11-15
Payer: COMMERCIAL

## 2023-11-15 VITALS
RESPIRATION RATE: 18 BRPM | BODY MASS INDEX: 23.8 KG/M2 | SYSTOLIC BLOOD PRESSURE: 108 MMHG | WEIGHT: 139.4 LBS | TEMPERATURE: 98.2 F | DIASTOLIC BLOOD PRESSURE: 61 MMHG | HEIGHT: 64 IN | HEART RATE: 61 BPM

## 2023-11-15 DIAGNOSIS — Z02.89 ENCOUNTER FOR COMPLETION OF FORM WITH PATIENT: Primary | ICD-10-CM

## 2023-11-15 DIAGNOSIS — E03.9 HYPOTHYROIDISM, UNSPECIFIED TYPE: ICD-10-CM

## 2023-11-15 DIAGNOSIS — M25.572 PAIN IN JOINT INVOLVING ANKLE AND FOOT, LEFT: ICD-10-CM

## 2023-11-15 DIAGNOSIS — I10 ESSENTIAL HYPERTENSION: ICD-10-CM

## 2023-11-15 DIAGNOSIS — Z00.00 ENCOUNTER FOR SCREENING AND PREVENTATIVE CARE: ICD-10-CM

## 2023-11-15 LAB
ANION GAP SERPL CALCULATED.3IONS-SCNC: 12 MMOL/L (ref 7–15)
BUN SERPL-MCNC: 9.5 MG/DL (ref 8–23)
CALCIUM SERPL-MCNC: 9.1 MG/DL (ref 8.8–10.2)
CHLORIDE SERPL-SCNC: 104 MMOL/L (ref 98–107)
CREAT SERPL-MCNC: 0.95 MG/DL (ref 0.51–0.95)
DEPRECATED HCO3 PLAS-SCNC: 26 MMOL/L (ref 22–29)
EGFRCR SERPLBLD CKD-EPI 2021: 68 ML/MIN/1.73M2
ERYTHROCYTE [DISTWIDTH] IN BLOOD BY AUTOMATED COUNT: 11.6 % (ref 10–15)
GLUCOSE SERPL-MCNC: 94 MG/DL (ref 70–99)
HCT VFR BLD AUTO: 32.3 % (ref 35–47)
HGB BLD-MCNC: 10.8 G/DL (ref 11.7–15.7)
MCH RBC QN AUTO: 31.1 PG (ref 26.5–33)
MCHC RBC AUTO-ENTMCNC: 33.4 G/DL (ref 31.5–36.5)
MCV RBC AUTO: 93 FL (ref 78–100)
PLATELET # BLD AUTO: 200 10E3/UL (ref 150–450)
POTASSIUM SERPL-SCNC: 3.9 MMOL/L (ref 3.4–5.3)
RBC # BLD AUTO: 3.47 10E6/UL (ref 3.8–5.2)
SODIUM SERPL-SCNC: 142 MMOL/L (ref 135–145)
TSH SERPL DL<=0.005 MIU/L-ACNC: 4.21 UIU/ML (ref 0.3–4.2)
WBC # BLD AUTO: 4.6 10E3/UL (ref 4–11)

## 2023-11-15 PROCEDURE — 80048 BASIC METABOLIC PNL TOTAL CA: CPT

## 2023-11-15 PROCEDURE — 99213 OFFICE O/P EST LOW 20 MIN: CPT | Mod: GC

## 2023-11-15 PROCEDURE — 85027 COMPLETE CBC AUTOMATED: CPT

## 2023-11-15 PROCEDURE — 84443 ASSAY THYROID STIM HORMONE: CPT

## 2023-11-15 PROCEDURE — 84439 ASSAY OF FREE THYROXINE: CPT

## 2023-11-15 PROCEDURE — 36415 COLL VENOUS BLD VENIPUNCTURE: CPT

## 2023-11-15 RX ORDER — RESPIRATORY SYNCYTIAL VIRUS VACCINE 120MCG/0.5
0.5 KIT INTRAMUSCULAR ONCE
Qty: 1 EACH | Refills: 0 | Status: CANCELLED | OUTPATIENT
Start: 2023-11-15 | End: 2023-11-15

## 2023-11-15 NOTE — PROGRESS NOTES
Assessment & Plan     Encounter for completion of form with patient   Completed renewal of parking permit.  Patient has chronic hip pain and unable to ambulate far without assistance of a cane.  As such completed paperwork.    Pain in joint involving ankle and foot   Tender over left ATFL x1 week.  Foot is inverted at baseline suggesting that this could be some chronic strain on that ligament from compensation secondary to hip pain.  Nontender elsewhere.  We will conservatively manage for now with ibuprofen/Voltaren gel as needed, ice, elevation.    The following labs were ordered by Dr. Corbin who last saw patient and were obtained prior to visit.  We did not have to discuss them in detail.    Encounter for screening and preventative care  Essential hypertension  - CBC with platelets  - Basic metabolic panel  (Ca, Cl, CO2, Creat, Gluc, K, Na, BUN)    Hypothyroidism, unspecified type  - TSH with free T4 reflex    Patient discussed with Dr. Gabino Ochoa MD  [facility]    Ken Snow is a 58 year old, presenting for the following health issues:  Forms (Renew handicap parking form ) and Foot Problems (Left foot , Burning, pain, started about a week )      11/15/2023     4:41 PM   Additional Questions   Roomed by ML   Accompanied by self       HPI     Presented for paperwork regarding parking permit.  Unable to ambulate without use of cane due to chronic hip pain.  Has had parking permit a past which  and as such return to clinic to complete form for coming years.    Also complaining of 1 week left ankle pain.  No recent injuries or falls.  History of 2 ankle sprains in the past.  Does also endorse some swelling.  Has been taking ibuprofen as needed for the pain.  Nontender elsewhere.    Review of Systems   As listed in HPI      Objective    /61 (BP Location: Left arm, Patient Position: Sitting, Cuff Size: Adult Regular)   Pulse 61   Temp 98.2  F (36.8  C)  "(Oral)   Resp 18   Ht 1.626 m (5' 4\")   Wt 63.2 kg (139 lb 6.4 oz)   BMI 23.93 kg/m    Body mass index is 23.93 kg/m .  Physical Exam     Left lower extremity: Tender over ATFL.  At baseline foot is inverted.  Mild swelling around the lateral malleolus.  No redness.  No bruising.  Nontender in the base of the fifth metatarsal or other surrounding ankle joints.        "

## 2023-11-15 NOTE — PATIENT INSTRUCTIONS
Today we completed paperwork for your parking permit.     Regarding your left ankle, let's use ice, iburpofen or voltaren gel as needed, and elevate so the swelling can improve as well.     If there's no improvement, please come back and see us.

## 2023-11-15 NOTE — PROGRESS NOTES
Preceptor Attestation:    I discussed the patient with the resident and evaluated the patient in person. I have verified the content of the note, which accurately reflects my assessment of the patient and the plan of care.   Supervising Physician:  Gabino Prakash MD.

## 2023-11-16 LAB — T4 FREE SERPL-MCNC: 0.85 NG/DL (ref 0.9–1.7)

## 2023-11-17 ENCOUNTER — TRANSFERRED RECORDS (OUTPATIENT)
Dept: HEALTH INFORMATION MANAGEMENT | Facility: CLINIC | Age: 58
End: 2023-11-17
Payer: COMMERCIAL

## 2023-12-08 ENCOUNTER — TRANSFERRED RECORDS (OUTPATIENT)
Dept: HEALTH INFORMATION MANAGEMENT | Facility: CLINIC | Age: 58
End: 2023-12-08
Payer: COMMERCIAL

## 2023-12-19 ENCOUNTER — TRANSFERRED RECORDS (OUTPATIENT)
Dept: HEALTH INFORMATION MANAGEMENT | Facility: CLINIC | Age: 58
End: 2023-12-19
Payer: COMMERCIAL

## 2023-12-28 ENCOUNTER — TRANSFERRED RECORDS (OUTPATIENT)
Dept: HEALTH INFORMATION MANAGEMENT | Facility: CLINIC | Age: 58
End: 2023-12-28
Payer: COMMERCIAL

## 2024-01-05 ENCOUNTER — TRANSFERRED RECORDS (OUTPATIENT)
Dept: HEALTH INFORMATION MANAGEMENT | Facility: CLINIC | Age: 59
End: 2024-01-05
Payer: COMMERCIAL

## 2024-01-30 ENCOUNTER — TRANSFERRED RECORDS (OUTPATIENT)
Dept: HEALTH INFORMATION MANAGEMENT | Facility: CLINIC | Age: 59
End: 2024-01-30
Payer: COMMERCIAL

## 2024-02-07 ENCOUNTER — OFFICE VISIT (OUTPATIENT)
Dept: FAMILY MEDICINE | Facility: CLINIC | Age: 59
End: 2024-02-07
Payer: COMMERCIAL

## 2024-02-07 VITALS
HEART RATE: 63 BPM | DIASTOLIC BLOOD PRESSURE: 70 MMHG | OXYGEN SATURATION: 99 % | TEMPERATURE: 98.6 F | BODY MASS INDEX: 24.41 KG/M2 | RESPIRATION RATE: 12 BRPM | SYSTOLIC BLOOD PRESSURE: 111 MMHG | WEIGHT: 137.8 LBS | HEIGHT: 63 IN

## 2024-02-07 DIAGNOSIS — Z29.11 NEED FOR VACCINATION AGAINST RESPIRATORY SYNCYTIAL VIRUS: ICD-10-CM

## 2024-02-07 DIAGNOSIS — S46.012A ROTATOR CUFF STRAIN, LEFT, INITIAL ENCOUNTER: ICD-10-CM

## 2024-02-07 DIAGNOSIS — Z12.31 VISIT FOR SCREENING MAMMOGRAM: Primary | ICD-10-CM

## 2024-02-07 DIAGNOSIS — S46.812A INFRASPINATUS STRAIN, LEFT, INITIAL ENCOUNTER: ICD-10-CM

## 2024-02-07 PROCEDURE — 99213 OFFICE O/P EST LOW 20 MIN: CPT | Performed by: STUDENT IN AN ORGANIZED HEALTH CARE EDUCATION/TRAINING PROGRAM

## 2024-02-07 RX ORDER — RESPIRATORY SYNCYTIAL VIRUS VACCINE 120MCG/0.5
0.5 KIT INTRAMUSCULAR ONCE
Qty: 1 EACH | Refills: 0 | Status: CANCELLED | OUTPATIENT
Start: 2024-02-07 | End: 2024-02-07

## 2024-02-07 NOTE — PROGRESS NOTES
Chief Complaint   Patient presents with    Arm Pain     Left arm, have pain x1 month and feels that it's worsen, feels aches and radial to back     Hip Problem     Hip pop out of place on Monday and more are popping out    Medication Reconciliation     Med reviewed       There are no exam notes on file for this visit.        Assessment/Plan:  Kendra Chang is a 58 year old female here for L posterior upper arm to L scapula pain, exam most consistent with infraspinatus strain without rupture or tear.  Counseled on diagnosis and reviewed symptomatic management including ongoing use of chronic pain medications, topicals, oral NSAIDs and/or tylenol, icing. Accepts referral to PT; given [facility] [employer] ortho handouts on acute shoulder and rotator cuff injury protocols.     Follow up if no improvement.     Gwendolyn was seen today for arm pain, hip problem and medication reconciliation.    Diagnoses and all orders for this visit:    Visit for screening mammogram    Need for vaccination against respiratory syncytial virus    Rotator cuff strain, left, initial encounter  -     Physical Therapy Referral; Future    Infraspinatus strain, left, initial encounter  -     Physical Therapy Referral; Future        No future appointments.    Maggie Arango MD      Patient Instructions   Continue to use topical medications and antiinflammatories.     You should receive a call about the PT referral within two days.  If you don't get a call, call the number on the AVS.    Subjective:  Kendra Chang is a 58 year old female here for posterior left arm and shoulder pain.    No inciting injury.   A little harder to do overhead things.   Uncomfortable but not weaker.   No mention to outside providers yet (ortho, pain med).  Pain clinic once a month.     Has tried lidocaine patches, menthol.   Camphor.  Helped for a bit, but stopped.  Not awakening overnight but notices with bathroom trips when she wakes up.       Patient Active Problem  "List   Diagnosis    Other atopic dermatitis and related conditions    Contact dermatitis and other eczema, due to unspecified cause    Rosacea    [facility]    Hip pain    S/P hip replacement    H/O: hysterectomy    Hx of ectopic pregnancy    Arm fracture    S/P revision of total hip    Hypothyroidism    DJD (degenerative joint disease), lumbar    Chronic pain syndrome    Encounter for completion of form with patient    Essential hypertension    Pain in joint involving ankle and foot, left       Current Outpatient Medications   Medication    Buprenorphine HCl (BELBUCA) 300 MCG FILM buccal film    IBUPROFEN PO    levothyroxine (SYNTHROID/LEVOTHROID) 50 MCG tablet    lisinopril (ZESTRIL) 20 MG tablet    omeprazole (PRILOSEC) 40 MG DR capsule    oxyCODONE-acetaminophen (PERCOCET)  MG per tablet    alendronate (FOSAMAX) 70 MG tablet    calcium carbonate (TUMS) 500 MG chewable tablet    Cholecalciferol (VITAMIN D) 400 UNITS capsule    diclofenac (VOLTAREN) 1 % topical gel    estradiol (ESTRACE VAGINAL) 0.1 MG/GM vaginal cream    Multiple Vitamin (MULTI-VITAMIN PO)    MYRBETRIQ 25 MG 24 hr tablet    ondansetron (ZOFRAN ODT) 4 MG ODT tab    oxybutynin ER (DITROPAN-XL) 5 MG 24 hr tablet     No current facility-administered medications for this visit.       Objective:  /70 (BP Location: Right arm, Patient Position: Sitting, Cuff Size: Adult Regular)   Pulse 63   Temp 98.6  F (37  C) (Oral)   Resp 12   Ht 1.6 m (5' 3\")   Wt 62.5 kg (137 lb 12.8 oz)   SpO2 99%   BMI 24.41 kg/m    Body mass index is 24.41 kg/m .  Gen: A/O x3, in NAD.  MSK:  Symmetric posture and muscle bulk of upper extremities and shoulders bilaterally.  Mildly tender posterior upper L arm with palpation. FROM shoulder flexion and abduction, behind back reach.  5/5 biceps and triceps strength.   Empty can test with slight discomfort of posterior L upper arm, 5/5 strength.  Resisted external rotation of left shoulder most uncomfortable " maneuver, with tenderness posterior L upper arm to inferior L scapula.  No tenderness resisted internal rotation. Strength 5/5 for all rotator cuff tests.  Lymph: No L axillary LAD appreciated  Neuro: Grossly intact.

## 2024-02-07 NOTE — PATIENT INSTRUCTIONS
Continue to use topical medications and antiinflammatories.     You should receive a call about the PT referral within two days.  If you don't get a call, call the number on the AVS.

## 2024-02-12 DIAGNOSIS — E03.9 HYPOTHYROIDISM, UNSPECIFIED TYPE: ICD-10-CM

## 2024-02-13 RX ORDER — LEVOTHYROXINE SODIUM 50 UG/1
50 TABLET ORAL DAILY
Qty: 90 TABLET | Refills: 3 | Status: SHIPPED | OUTPATIENT
Start: 2024-02-13 | End: 2025-02-12

## 2024-02-29 ENCOUNTER — TRANSFERRED RECORDS (OUTPATIENT)
Dept: HEALTH INFORMATION MANAGEMENT | Facility: CLINIC | Age: 59
End: 2024-02-29
Payer: COMMERCIAL

## 2024-03-29 ENCOUNTER — TRANSFERRED RECORDS (OUTPATIENT)
Dept: HEALTH INFORMATION MANAGEMENT | Facility: CLINIC | Age: 59
End: 2024-03-29
Payer: COMMERCIAL

## 2024-04-01 ENCOUNTER — TRANSFERRED RECORDS (OUTPATIENT)
Dept: HEALTH INFORMATION MANAGEMENT | Facility: CLINIC | Age: 59
End: 2024-04-01
Payer: COMMERCIAL

## 2024-04-30 ENCOUNTER — TRANSFERRED RECORDS (OUTPATIENT)
Dept: HEALTH INFORMATION MANAGEMENT | Facility: CLINIC | Age: 59
End: 2024-04-30
Payer: COMMERCIAL

## 2024-05-28 ENCOUNTER — TRANSFERRED RECORDS (OUTPATIENT)
Dept: HEALTH INFORMATION MANAGEMENT | Facility: CLINIC | Age: 59
End: 2024-05-28
Payer: COMMERCIAL

## 2024-06-27 ENCOUNTER — TRANSFERRED RECORDS (OUTPATIENT)
Dept: HEALTH INFORMATION MANAGEMENT | Facility: CLINIC | Age: 59
End: 2024-06-27
Payer: COMMERCIAL

## 2024-06-29 DIAGNOSIS — I10 ESSENTIAL HYPERTENSION: ICD-10-CM

## 2024-07-01 RX ORDER — LISINOPRIL 20 MG/1
20 TABLET ORAL DAILY
Qty: 90 TABLET | Refills: 0 | Status: SHIPPED | OUTPATIENT
Start: 2024-07-01 | End: 2024-10-04

## 2024-07-01 NOTE — TELEPHONE ENCOUNTER
Name from pharmacy: Lisinopril Oral Tablet 20 MG          Will file in chart as: lisinopril (ZESTRIL) 20 MG tablet    Sig: Take 1 tablet (20 mg) by mouth daily    Disp: 90 tablet    Refills: 0    Start: 6/29/2024    Class: E-Prescribe    Non-formulary For: Essential hypertension    Last ordered: 1 year ago (4/19/2023) by Ronnie Jung MD    Last refill: 12/26/2023    Rx #: [id number]    ACE Inhibitors (Including Combos) Protocol [id number]/29/2024 10:15 AM   Protocol Details Blood pressure under 140/90 in past 12 months- Clinicial or Patient Reported    Medication is active on med list    Medication indicated for associated diagnosis    Has GFR on file in past 12 months and most recent value is normal    Recent (12 mo) or future (90 days) visit within the authorizing provider's specialty    Patient is age 18 or older    No active pregnancy on record    Normal serum potassium on file in past 12 months    No positive pregnancy test within past 12 months      To be filled at: [id number] [address]     BP Readings from Last 3 Encounters:   02/07/24 111/70   11/15/23 108/61   08/03/23 99/65     Component      Latest Ref Rng 11/15/2023  4:20 PM   GFR Estimate      >60 mL/min/1.73m2 68        Component      Latest Ref Rng 11/15/2023  4:20 PM   Potassium      3.4 - 5.3 mmol/L 3.9

## 2024-07-25 ENCOUNTER — TRANSFERRED RECORDS (OUTPATIENT)
Dept: HEALTH INFORMATION MANAGEMENT | Facility: CLINIC | Age: 59
End: 2024-07-25
Payer: COMMERCIAL

## 2024-07-30 ENCOUNTER — TRANSFERRED RECORDS (OUTPATIENT)
Dept: HEALTH INFORMATION MANAGEMENT | Facility: CLINIC | Age: 59
End: 2024-07-30
Payer: COMMERCIAL

## 2024-08-09 ENCOUNTER — TRANSFERRED RECORDS (OUTPATIENT)
Dept: HEALTH INFORMATION MANAGEMENT | Facility: CLINIC | Age: 59
End: 2024-08-09
Payer: COMMERCIAL

## 2024-08-27 ENCOUNTER — TRANSFERRED RECORDS (OUTPATIENT)
Dept: HEALTH INFORMATION MANAGEMENT | Facility: CLINIC | Age: 59
End: 2024-08-27
Payer: COMMERCIAL

## 2024-09-06 ENCOUNTER — TRANSFERRED RECORDS (OUTPATIENT)
Dept: HEALTH INFORMATION MANAGEMENT | Facility: CLINIC | Age: 59
End: 2024-09-06
Payer: COMMERCIAL

## 2024-09-26 ENCOUNTER — OFFICE VISIT (OUTPATIENT)
Dept: FAMILY MEDICINE | Facility: CLINIC | Age: 59
End: 2024-09-26
Payer: COMMERCIAL

## 2024-09-26 ENCOUNTER — TRANSFERRED RECORDS (OUTPATIENT)
Dept: HEALTH INFORMATION MANAGEMENT | Facility: CLINIC | Age: 59
End: 2024-09-26

## 2024-09-26 VITALS
HEIGHT: 63 IN | RESPIRATION RATE: 20 BRPM | OXYGEN SATURATION: 97 % | SYSTOLIC BLOOD PRESSURE: 101 MMHG | DIASTOLIC BLOOD PRESSURE: 60 MMHG | BODY MASS INDEX: 23.74 KG/M2 | WEIGHT: 134 LBS | TEMPERATURE: 99.1 F | HEART RATE: 64 BPM

## 2024-09-26 DIAGNOSIS — K21.9 GASTROESOPHAGEAL REFLUX DISEASE, UNSPECIFIED WHETHER ESOPHAGITIS PRESENT: ICD-10-CM

## 2024-09-26 DIAGNOSIS — E03.8 OTHER SPECIFIED HYPOTHYROIDISM: ICD-10-CM

## 2024-09-26 DIAGNOSIS — Z23 ENCOUNTER FOR IMMUNIZATION: Primary | ICD-10-CM

## 2024-09-26 DIAGNOSIS — R10.9 CHRONIC ABDOMINAL PAIN: ICD-10-CM

## 2024-09-26 DIAGNOSIS — G89.29 CHRONIC ABDOMINAL PAIN: ICD-10-CM

## 2024-09-26 DIAGNOSIS — D64.9 ANEMIA, UNSPECIFIED TYPE: ICD-10-CM

## 2024-09-26 LAB
ALBUMIN SERPL BCG-MCNC: 4.3 G/DL (ref 3.5–5.2)
ALP SERPL-CCNC: 93 U/L (ref 40–150)
ALT SERPL W P-5'-P-CCNC: 25 U/L (ref 0–50)
AST SERPL W P-5'-P-CCNC: 35 U/L (ref 0–45)
BASOPHILS # BLD AUTO: 0 10E3/UL (ref 0–0.2)
BASOPHILS NFR BLD AUTO: 1 %
BILIRUB DIRECT SERPL-MCNC: <0.2 MG/DL (ref 0–0.3)
BILIRUB SERPL-MCNC: 0.2 MG/DL
EOSINOPHIL # BLD AUTO: 0.1 10E3/UL (ref 0–0.7)
EOSINOPHIL NFR BLD AUTO: 2 %
ERYTHROCYTE [DISTWIDTH] IN BLOOD BY AUTOMATED COUNT: 11.6 % (ref 10–15)
HCT VFR BLD AUTO: 35.3 % (ref 35–47)
HGB BLD-MCNC: 12 G/DL (ref 11.7–15.7)
IMM GRANULOCYTES # BLD: 0 10E3/UL
IMM GRANULOCYTES NFR BLD: 0 %
LYMPHOCYTES # BLD AUTO: 2 10E3/UL (ref 0.8–5.3)
LYMPHOCYTES NFR BLD AUTO: 38 %
MCH RBC QN AUTO: 31.3 PG (ref 26.5–33)
MCHC RBC AUTO-ENTMCNC: 34 G/DL (ref 31.5–36.5)
MCV RBC AUTO: 92 FL (ref 78–100)
MONOCYTES # BLD AUTO: 0.3 10E3/UL (ref 0–1.3)
MONOCYTES NFR BLD AUTO: 6 %
NEUTROPHILS # BLD AUTO: 2.8 10E3/UL (ref 1.6–8.3)
NEUTROPHILS NFR BLD AUTO: 53 %
PLATELET # BLD AUTO: 265 10E3/UL (ref 150–450)
PROT SERPL-MCNC: 6.8 G/DL (ref 6.4–8.3)
RBC # BLD AUTO: 3.83 10E6/UL (ref 3.8–5.2)
TSH SERPL DL<=0.005 MIU/L-ACNC: 1.65 UIU/ML (ref 0.3–4.2)
WBC # BLD AUTO: 5.2 10E3/UL (ref 4–11)

## 2024-09-26 RX ORDER — ONDANSETRON 4 MG/1
4 TABLET, ORALLY DISINTEGRATING ORAL EVERY 8 HOURS PRN
Qty: 30 TABLET | Refills: 1 | Status: SHIPPED | OUTPATIENT
Start: 2024-09-26

## 2024-09-26 NOTE — PROGRESS NOTES
Preventive Care Visit  [facility]  Kayla Hopkins MD, Family Medicine  Sep 26, 2024      Assessment & Plan     Encounter for immunization  - INFLUENZA VACCINE, SPLIT VIRUS, TRIVALENT,PF (FLUZONE\FLUARIX)    Gastroesophageal reflux disease, unspecified whether esophagitis present: patient does have relief of some nausea with Zofran. Will refill.  - ondansetron (ZOFRAN ODT) 4 MG ODT tab; Take 1 tablet (4 mg) by mouth every 8 hours as needed for nausea.    Other specified hypothyroidism  - TSH; Future  - TSH    Chronic abdominal pain  Patient's abdominal pain is concerning to me. Negative EGD, but non-diagnostic colonoscopy. Patient has had a general decline in weight since 2022, but appears to have increased velocity in the last several months. No other B symptoms. Patient is being awakened at night with symptoms and has many non-specific symptoms including possible food-fear, bloating. Need to assess for malignancy including ovarian/peritoneal malignancy, pancreatic malignancy. Will also assess for mesenteric stenosis.  - CT ABDOMEN PELVIS W CONTRAST; Future  - Hepatic function panel; Future  - Hepatic function panel    Anemia, unspecified type  - CBC with Platelets & Differential; Future  - CBC with Platelets & Differential  No follow-ups on file.    Subjective   Gwendolyn is a 59 year old, presenting for the following:  Physical (CPE)        9/26/2024    10:58 AM   Additional Questions   Roomed by FAUSTO   Accompanied by SELF         9/26/2024    Information    services provided? No              Patient presenting with a complaint and would prefer to have a problem based visit than a Medicare Wellness Visit.    Patient reports that she has been having stomach pain that is there all the time, but it intermittently gets worse in the middle of the night so much so that she is waking up. She has cut a lot of foods out and not had that much improvement in her symptoms. Pain is  epigastric in region. She also feels really bloated intermittently, but this doesn't correspond to when she has worsening abdominal pain. She does not feel that the pain radiates into the chest or improves with antacids. She has been taking miralax on a daily basis and has soft stools. She has yellow soft stools with no blood.    She went through menopause in the 90s. Had a hysterectomy for because she had multiple ectopic pregnancies and a cyst. She notes that she has one ovary.    No vomiting. She has poor appeitite and has to force herself to eat. She is not trying to lose weight but is losing weight. It doesn't hurt when she eats, but rather hurts when she is digesting. No night sweats, no shaking chills.    She takes Zofran for nausea and stomach pain. It doesn't really help with her stomach pain but does help with the nausea.    Can't sleep laying down; does better sitting up.    Has had a benign endoscopy and a non-diagnostic colonoscopy. Is going to have a repeat colonoscopy. Has not had an abdominal CT.          9/22/2024   General Health   How would you rate your overall physical health? Good   Feel stress (tense, anxious, or unable to sleep) Not at all            9/22/2024   Nutrition   Diet: Other   If other, please elaborate: Have to be on a gerd diet            9/22/2024   Exercise   Days per week of moderate/strenous exercise 1 day   Average minutes spent exercising at this level 20 min      (!) EXERCISE CONCERN      9/22/2024   Social Factors   Frequency of gathering with friends or relatives Three times a week   Worry food won't last until get money to buy more No   Food not last or not have enough money for food? No   Do you have housing? (Housing is defined as stable permanent housing and does not include staying ouside in a car, in a tent, in an abandoned building, in an overnight shelter, or couch-surfing.) Yes   Are you worried about losing your housing? No   Lack of transportation? No   Unable  to get utilities (heat,electricity)? No            2024   Fall Risk   Fallen 2 or more times in the past year? Yes   Trouble with walking or balance? Yes              2024   Activities of Daily Living- Home Safety   Needs help with the following daily activites None of the above   Safety concerns in the home None of the above            2024   Dental   Dentist two times every year? (!) NO            2024   Hearing Screening   Hearing concerns? None of the above            2024   Driving Risk Screening   Patient/family members have concerns about driving No            2024   General Alertness/Fatigue Screening   Have you been more tired than usual lately? (!) YES            2024   Urinary Incontinence Screening   Bothered by leaking urine in past 6 months Yes            2024   TB Screening   Were you born outside of the [place]? No          Today's PHQ-2 Score:       2024     4:45 PM   PHQ-2 (  [employer])   Q1: Little interest or pleasure in doing things 0   Q2: Feeling down, depressed or hopeless 0   PHQ-2 Score 0         2024   Substance Use   Alcohol more than 3/day or more than 7/wk Not Applicable   Do you have a current opioid prescription? (!) YES   How severe/bad is pain from 1 to 10? 6/10   Do you use any other substances recreationally? No      Social History     Tobacco Use    Smoking status: Former     Current packs/day: 0.00     Types: Cigarettes     Quit date: 2/15/1985     Years since quittin.6    Smokeless tobacco: Never   Vaping Use    Vaping status: Never Used   Substance Use Topics    Alcohol use: No    Drug use: No              Latest Ref Rng & Units 2022    11:32 AM   PAP / HPV   PAP  Negative for Intraepithelial Lesion or Malignancy (NILM)    HPV 16 DNA Negative Negative    HPV 18 DNA Negative Negative    Other HR HPV Negative Negative      ASCVD Risk   The 10-year ASCVD risk score (Kristin NETTLES, et al., 2019) is: 4.2%    Values used to  calculate the score:      Age: 59 years      Sex: Female      Is Non- : No      Diabetic: No      Tobacco smoker: No      Systolic Blood Pressure: 111 mmHg      Is BP treated: Yes      HDL Cholesterol: 48 mg/dL      Total Cholesterol: 211 mg/dL        Reviewed and updated as needed this visit by Provider                    Past Medical History:   Diagnosis Date    Arthritis     dysplagia right hip    Arthritis     back, neck    Disease of thyroid gland     Eczema     hands    Essential hypertension 11/15/2023    Gastro-oesophageal reflux disease     GERD (gastroesophageal reflux disease)     History of blood transfusion     History of transfusion     Hypertension     Mumps     Palpitations     STD (sexually transmitted disease)     Thyroid disease      Past Surgical History:   Procedure Laterality Date    ABDOMEN SURGERY      cholecystectomy    APPENDECTOMY      APPENDECTOMY      ARTHROPLASTY REVISION HIP  2013    Procedure: ARTHROPLASTY REVISION HIP;  Right Total Hip Arthroplasty Revision   LATEX ALLERGY;  Surgeon: Fran Panda MD;  Location:  OR    CHOLECYSTECTOMY      CLOSED REDUCTION HIP Right 2018    Procedure: CLOSED REDUCTION, RIGHT HIP;  Surgeon: Salvatore Cosme MD;  Location: [facility];  Service:     ENT SURGERY      tonsillectomy    FRACTURE SURGERY      elbow and right hip    GENITOURINARY SURGERY      3 tubal pregnancies    GYN SURGERY      HYSTERECTOMY      HYSTERECTOMY      JOINT REPLACEMENT      right hip 4 times    OOPHORECTOMY      One ovary    ORTHOPEDIC SURGERY      Right total hip () & revision () & hardware removal ()    ORTHOPEDIC SURGERY      Right elbow surgery x 3    TONSILLECTOMY       OB History    Para Term  AB Living   3 3 3 0 0 0   SAB IAB Ectopic Multiple Live Births   0 0 0 0 0      # Outcome Date GA Lbr Cholo/2nd Weight Sex Type Anes PTL Lv   3 Term            2 Term            1 Term              Current  "providers sharing in care for this patient include:  Patient Care Team:  Maggie Arango MD as PCP - General (Family Medicine)  Fran Cooley MD as MD (Specialist)  Beka Khalil MD as MD (Physical Medicine & Rehabilitation - Pain Medicine)  Fran Panda MD as MD (Orthopedics)  Mike Coy MD as MD (Gastroenterology)  Maggie Arango MD as Assigned PCP    The following health maintenance items are reviewed in Epic and correct as of today:  Health Maintenance   Topic Date Due    ZOSTER IMMUNIZATION (1 of 2) Never done    URINE DRUG SCREEN  02/02/2018    MAMMO SCREENING  02/16/2020    RSV VACCINE (1 - Risk 60-74 years 1-dose series) Never done    MEDICARE ANNUAL WELLNESS VISIT  08/16/2023    INFLUENZA VACCINE (1) 09/01/2024    COVID-19 Vaccine (1 - 2024-25 season) Never done    COLORECTAL CANCER SCREENING  08/22/2024    BMP  11/15/2024    TSH W/FREE T4 REFLEX  11/15/2024    GLUCOSE  11/15/2026    HPV TEST  08/16/2027    LIPID  08/16/2027    PAP  08/16/2027    ADVANCE CARE PLANNING  08/19/2027    DTAP/TDAP/TD IMMUNIZATION (3 - Td or Tdap) 01/15/2030    HEPATITIS C SCREENING  Completed    HIV SCREENING  Completed    PHQ-2 (once per calendar year)  Completed    Pneumococcal Vaccine: Pediatrics (0 to 5 Years) and At-Risk Patients (6 to 64 Years)  Aged Out    HPV IMMUNIZATION  Aged Out    MENINGITIS IMMUNIZATION  Aged Out    RSV MONOCLONAL ANTIBODY  Aged Out         Review of Systems  Constitutional, HEENT, cardiovascular, pulmonary, gi and gu systems are negative, except as otherwise noted.     Objective    Exam  There were no vitals taken for this visit.   Estimated body mass index is 24.41 kg/m  as calculated from the following:    Height as of 2/7/24: 1.6 m (5' 3\").    Weight as of 2/7/24: 62.5 kg (137 lb 12.8 oz).    Physical Exam  Constitutional:       General: She is not in acute distress.  HENT:      Head: Normocephalic and atraumatic.   Cardiovascular:      Rate and Rhythm: " Normal rate and regular rhythm.      Heart sounds: No murmur heard.  Pulmonary:      Effort: Pulmonary effort is normal. No respiratory distress.   Abdominal:      General: Abdomen is flat.      Hernia: No hernia is present.      Comments: Periumbilical deep mass vs. Localized guarding palpated. Tender to palpation in this region. Mild tenderness to palpation in the left adnexal region.   Neurological:      Mental Status: She is alert.          Signed Electronically by: Kayla Hopkins MD

## 2024-09-29 LAB — LIPASE SERPL-CCNC: 38 U/L (ref 13–60)

## 2024-10-02 DIAGNOSIS — I10 ESSENTIAL HYPERTENSION: ICD-10-CM

## 2024-10-02 NOTE — TELEPHONE ENCOUNTER
Name from pharmacy: Lisinopril Oral Tablet 20 MG          Will file in chart as: lisinopril (ZESTRIL) 20 MG tablet    Sig: Take 1 tablet (20 mg) by mouth daily    Disp: 90 tablet    Refills: 0    Start: 10/2/2024    Class: E-Prescribe    Non-formulary For: Essential hypertension    Last ordered: 3 months ago (7/1/2024) by Maggie Arango MD    Last refill: 7/11/2024    Rx #: [id number]    ACE Inhibitors (Including Combos) Protocol Pvoxwm02/02/2024 02:01 AM   Protocol Details Blood pressure under 140/90 in past 12 months- Clinicial or Patient Reported    Medication is active on med list    Medication indicated for associated diagnosis    Recent (12 mo) or future (90 days) visit within the authorizing provider's specialty    Most recent GFR on file in the past 12 months >30    Patient is age 18 or older    No active pregnancy on record    Normal serum potassium on file in past 12 months    No positive pregnancy test within past 12 months        BP Readings from Last 3 Encounters:   09/26/24 101/60   02/07/24 111/70   11/15/23 108/61     GFR Estimate   Date Value Ref Range Status   11/15/2023 68 >60 mL/min/1.73m2 Final   05/04/2021 >60 >60 mL/min/1.73m2 Final   08/12/2020 80.7 >60.0 mL/min/1.7 m2 Final     Potassium   Date Value Ref Range Status   11/15/2023 3.9 3.4 - 5.3 mmol/L Final   05/04/2021 3.7 3.5 - 5.0 mmol/L Final   08/12/2020 4.0 3.2 - 4.6 mmol/L Final     Routing refill request to provider for review/approval because:  Drug interaction warning      Stan RN, BSN

## 2024-10-04 RX ORDER — LISINOPRIL 20 MG/1
20 TABLET ORAL DAILY
Qty: 90 TABLET | Refills: 3 | Status: SHIPPED | OUTPATIENT
Start: 2024-10-04

## 2024-10-14 DIAGNOSIS — K21.9 GASTROESOPHAGEAL REFLUX DISEASE, UNSPECIFIED WHETHER ESOPHAGITIS PRESENT: ICD-10-CM

## 2024-10-14 RX ORDER — ONDANSETRON 4 MG/1
4 TABLET, ORALLY DISINTEGRATING ORAL EVERY 8 HOURS PRN
Qty: 30 TABLET | Refills: 1 | Status: SHIPPED | OUTPATIENT
Start: 2024-10-14

## 2024-10-14 NOTE — TELEPHONE ENCOUNTER
ondansetron (ZOFRAN ODT) 4 MG ODT tab          Sig: Take 1 tablet (4 mg) by mouth every 8 hours as needed for nausea.    Disp: 30 tablet    Refills: 1    Start: 10/14/2024    Class: E-Prescribe    For: Gastroesophageal reflux disease, unspecified whether esophagitis present    Last ordered: 2 weeks ago (9/26/2024) by Kayla Hopkins MD    Prior authorization request will be sent when the order is signed.     Antivertigo/Antiemetic Agents Wbxyva37/14/2024 09:50 AM   Protocol Details Medication is active on med list    Medication indicated for associated diagnosis    Recent (12 mo) or future (90 days) visit with authorizing provider's specialty    Patient is 18 years of age or older          Prescription approved per [facility] Refill Protocol.    Marvin Grace RN, MSN

## 2024-10-17 ENCOUNTER — TRANSFERRED RECORDS (OUTPATIENT)
Dept: HEALTH INFORMATION MANAGEMENT | Facility: CLINIC | Age: 59
End: 2024-10-17
Payer: COMMERCIAL

## 2024-10-28 ENCOUNTER — HOSPITAL ENCOUNTER (OUTPATIENT)
Dept: CT IMAGING | Facility: CLINIC | Age: 59
Discharge: HOME | End: 2024-10-28
Attending: STUDENT IN AN ORGANIZED HEALTH CARE EDUCATION/TRAINING PROGRAM | Admitting: STUDENT IN AN ORGANIZED HEALTH CARE EDUCATION/TRAINING PROGRAM
Payer: COMMERCIAL

## 2024-10-28 DIAGNOSIS — G89.29 CHRONIC ABDOMINAL PAIN: ICD-10-CM

## 2024-10-28 DIAGNOSIS — R10.9 CHRONIC ABDOMINAL PAIN: ICD-10-CM

## 2024-10-28 PROCEDURE — 74177 CT ABD & PELVIS W/CONTRAST: CPT

## 2024-10-28 PROCEDURE — 250N000011 HC RX IP 250 OP 636: Performed by: STUDENT IN AN ORGANIZED HEALTH CARE EDUCATION/TRAINING PROGRAM

## 2024-10-28 RX ORDER — IOPAMIDOL 755 MG/ML
90 INJECTION, SOLUTION INTRAVASCULAR ONCE
Status: COMPLETED | OUTPATIENT
Start: 2024-10-28 | End: 2024-10-28

## 2024-10-28 RX ADMIN — IOPAMIDOL 90 ML: 755 INJECTION, SOLUTION INTRAVENOUS at 10:26

## 2024-11-01 ENCOUNTER — TRANSFERRED RECORDS (OUTPATIENT)
Dept: HEALTH INFORMATION MANAGEMENT | Facility: CLINIC | Age: 59
End: 2024-11-01
Payer: COMMERCIAL

## 2024-11-01 NOTE — TELEPHONE ENCOUNTER
Prior Authorization Not Needed per Insurance    Medication: ondansetron (ZOFRAN ODT) 4 MG ODT tab is covered under patients formulary plan.    Pharmacy Notified:  Pharmacy has received a paid claim for 30 tablets per 10 days on 8/3/23.  Patient Notified:  No    Please close encounter when finished.    Thank you,  Central Prior Authorization Team  [phone]     Glaucoma

## 2024-11-13 ENCOUNTER — LAB REQUISITION (OUTPATIENT)
Dept: LAB | Facility: CLINIC | Age: 59
End: 2024-11-13
Payer: COMMERCIAL

## 2024-11-13 ENCOUNTER — TRANSFERRED RECORDS (OUTPATIENT)
Dept: HEALTH INFORMATION MANAGEMENT | Facility: CLINIC | Age: 59
End: 2024-11-13

## 2024-11-13 DIAGNOSIS — M25.551 PAIN IN RIGHT HIP: ICD-10-CM

## 2024-11-13 LAB
BASOPHILS # BLD AUTO: 0 10E3/UL (ref 0–0.2)
BASOPHILS NFR BLD AUTO: 1 %
CRP SERPL-MCNC: <3 MG/L
EOSINOPHIL # BLD AUTO: 0.1 10E3/UL (ref 0–0.7)
EOSINOPHIL NFR BLD AUTO: 1 %
ERYTHROCYTE [DISTWIDTH] IN BLOOD BY AUTOMATED COUNT: 11.6 % (ref 10–15)
ERYTHROCYTE [SEDIMENTATION RATE] IN BLOOD BY WESTERGREN METHOD: 11 MM/HR (ref 0–30)
HCT VFR BLD AUTO: 34.1 % (ref 35–47)
HGB BLD-MCNC: 11.5 G/DL (ref 11.7–15.7)
IMM GRANULOCYTES # BLD: 0 10E3/UL
IMM GRANULOCYTES NFR BLD: 0 %
LYMPHOCYTES # BLD AUTO: 1.1 10E3/UL (ref 0.8–5.3)
LYMPHOCYTES NFR BLD AUTO: 25 %
MCH RBC QN AUTO: 31.7 PG (ref 26.5–33)
MCHC RBC AUTO-ENTMCNC: 33.7 G/DL (ref 31.5–36.5)
MCV RBC AUTO: 94 FL (ref 78–100)
MONOCYTES # BLD AUTO: 0.3 10E3/UL (ref 0–1.3)
MONOCYTES NFR BLD AUTO: 7 %
NEUTROPHILS # BLD AUTO: 2.9 10E3/UL (ref 1.6–8.3)
NEUTROPHILS NFR BLD AUTO: 66 %
NRBC # BLD AUTO: 0 10E3/UL
NRBC BLD AUTO-RTO: 0 /100
PLATELET # BLD AUTO: 207 10E3/UL (ref 150–450)
RBC # BLD AUTO: 3.63 10E6/UL (ref 3.8–5.2)
WBC # BLD AUTO: 4.5 10E3/UL (ref 4–11)

## 2024-11-13 PROCEDURE — 85025 COMPLETE CBC W/AUTO DIFF WBC: CPT | Mod: ORL | Performed by: PHYSICIAN ASSISTANT

## 2024-11-13 PROCEDURE — 86140 C-REACTIVE PROTEIN: CPT | Mod: ORL | Performed by: PHYSICIAN ASSISTANT

## 2024-11-13 PROCEDURE — 36415 COLL VENOUS BLD VENIPUNCTURE: CPT | Mod: ORL | Performed by: PHYSICIAN ASSISTANT

## 2024-11-13 PROCEDURE — 85652 RBC SED RATE AUTOMATED: CPT | Mod: ORL | Performed by: PHYSICIAN ASSISTANT

## 2024-11-14 ENCOUNTER — TRANSFERRED RECORDS (OUTPATIENT)
Dept: HEALTH INFORMATION MANAGEMENT | Facility: CLINIC | Age: 59
End: 2024-11-14
Payer: COMMERCIAL

## 2024-11-27 ENCOUNTER — TRANSFERRED RECORDS (OUTPATIENT)
Dept: HEALTH INFORMATION MANAGEMENT | Facility: CLINIC | Age: 59
End: 2024-11-27
Payer: COMMERCIAL

## 2024-12-22 ENCOUNTER — HEALTH MAINTENANCE LETTER (OUTPATIENT)
Age: 59
End: 2024-12-22

## 2025-01-17 ENCOUNTER — TRANSFERRED RECORDS (OUTPATIENT)
Dept: HEALTH INFORMATION MANAGEMENT | Facility: CLINIC | Age: 60
End: 2025-01-17
Payer: COMMERCIAL

## 2025-02-03 DIAGNOSIS — Z12.31 ENCOUNTER FOR SCREENING MAMMOGRAM FOR BREAST CANCER: Primary | ICD-10-CM

## 2025-02-04 ENCOUNTER — PATIENT OUTREACH (OUTPATIENT)
Dept: CARE COORDINATION | Facility: CLINIC | Age: 60
End: 2025-02-04
Payer: COMMERCIAL

## 2025-02-05 NOTE — PATIENT INSTRUCTIONS
Brandy De Leon  [id number]  2/5/2025    DERMATOLOGY AT THE [facility]     HISTORY OF PRESENT ILLNESS:    Brandy is a pleasant 28 year old female who presents at the kind request of Dr. Valdez for a full skin cancer screening.  Patient denies any active areas of concern on exam today.    PMHx/PSHx:     Patient denies any personal history of skin cancer and no family history of melanoma.      Reviewed in Epic    Meds:    Reviewed in Epic    PHYSICAL EXAM    1.4 x 6mm - R proximal lateral thigh    FULL SKIN EXAM EXCEPT /BUTTOCK/BREASTS AREAS:    Chaperon: Ayah Olivo MA    A FBSE to include face, scalp, chest, abd, back, UE, LE, Hands and feet area failed to demonstrate any areas concerning for cutaneous malignancy except as noted:    Exam showed a 14 x 6 mm two-tone irregular brown patch along right proximal lateral thigh.    Exam showed a few scattered nevi but none very different from rest or concerning for malignancy on exam today.    Exam showed rather prominent oily white furferacous scale throughout scalp consistent with seborrheic dermatitis    LYMPH: No pre/post auricular, cervical, submental, submandibular or supraclavicular lymphadenopathy.    /Buttock/Breasts => deferred per pt preference. Denies active concerns.    ASSESSMENT/PLAN:    1) neoplasm of skin:     Patient exam provider recommended biopsy to rule out dysplastic nevus versus malignant melanoma.    Tangential biopsy technique was performed. Prior to the biopsy, the intervention was discussed thoroughly and the patient was directly involved in the decision-making process. The patient's medical condition and diagnosis were reviewed and were determined to be appropriate for the planned intervention. A description of the proposed biopsy procedure was discussed with the patient. Risks and side effects, including but not limited to infection, bleeding, scarring, lack of confirmed diagnostic result, as well as other potential problems,  Labs today      were discussed with the patient. Benefits in terms of obtaining a diagnosis were discussed as well. Alternative options, including the potential risks, benefits, and side effects were discussed also. Also this could include simple observation with a significant drawback of that approach being the lack of a known diagnosis. Informed written consent was obtained. The identification of the provider who would be undertaking the intervention was made clear to the patient and the patient did agree. All applicable limitations on the confidentiality of the information obtained from the intervention were discussed. The entire informed consent process was undertaken in the native language of the patient and all questions and concerns were addressed with the patient, who indicated full understanding. Education regarding the intervention as well as risks, benefits, and aftercare requirements were provided to the patient verbally in their native language.     The area was prepped and draped in a standard manner and anesthetized with 1 % Lidocaine with Epinephrine. A tangential biopsy technique was performed with a sterile surgical blade without complication and patient tolerated procedure well. Specimen was sent to Pathology. Drysol hemostasis was obtained. Dressings were applied and wound care was discussed. The patient will be notified of results and advised to contact clinic in 2 wks if not contacted by clinic sooner (this instruction and clinic number provided in writing).     -verbal consent obtained  -shave biopsy along the R proximal lateral thigh   -rule out dysplastic nevus versus malignant melanoma   -Counseled patient that if biopsy shows a skin cancer, it is imperative to treated in a timely fashion as untreated skin cancers can continue to grow, be destructive in nature locally and eventually spread.  -will treat based on biopsy results     A full body skin cancer screening examination was completed today. The  pertinent findings were addressed. Patient encouraged to perform monthly self skin examinations and will follow-up as directed. May return to clinic sooner should any new or concerning lesions arise.    - advised to return to clinic if any new or changing lesions    - recommended daily sunscreen SPF 50 or higher.    2) multiple nevi:    Reassured patient that exam today did not demonstrate any nevi concerning for malignancy. Counseled patient that up to half of melanomas arise within pre-existing nevi and recommended continued monitoring.    Also counseled patient that skin cancers can arise in areas that do not have direct sun exposure and patient agrees to monitor those areas as well.    3) seborrheic dermatitis:    -Ketoconazole 2% shampoo 3 times weekly along scalp    4) skin cancer screening:    Counseled about role of UV light in both melanoma as well as nonmelanoma skin cancers. Recommended use of over-the-counter sunscreen with physical blockers, use of sun protective clothing, wide brim hats and avoidance of UV light during peak hours.    On 2/5/2025, IAyah MA scribed the services personally performed by Kota Guerrier MD     The documentation recorded by the scribe accurately and completely reflects the service(s) I personally performed and the decisions made by me.      Kota Guerrier MD

## 2025-02-12 ENCOUNTER — TRANSFERRED RECORDS (OUTPATIENT)
Dept: HEALTH INFORMATION MANAGEMENT | Facility: CLINIC | Age: 60
End: 2025-02-12
Payer: COMMERCIAL

## 2025-02-12 DIAGNOSIS — K21.9 GASTROESOPHAGEAL REFLUX DISEASE, UNSPECIFIED WHETHER ESOPHAGITIS PRESENT: ICD-10-CM

## 2025-02-12 NOTE — TELEPHONE ENCOUNTER
Name from pharmacy: Ondansetron Oral Tablet Disintegrating 4 MG          Will file in chart as: ondansetron (ZOFRAN ODT) 4 MG ODT tab    Sig: DISSOLVE 1 TABLET (4 MG) UNDER THE TONGUE EVERY 8 HOURS AS NEEDED FOR NAUSEA.    Disp: 30 tablet    Refills: 0    Start: 2/12/2025    Class: E-Prescribe    Non-formulary For: Gastroesophageal reflux disease, unspecified whether esophagitis present    Last ordered: 4 months ago (10/14/2024) by Maggie Arango MD    Last refill: 12/15/2024    Rx #: [id number]     Antivertigo/Antiemetic Agents [id number]/12/2025 04:00 PM   Protocol Details Medication is active on med list and the sig matches. RN to manually verify dose and sig if red X/fail.        MARGE Pierce, BSN

## 2025-02-13 RX ORDER — ONDANSETRON 4 MG/1
TABLET, ORALLY DISINTEGRATING ORAL
Qty: 30 TABLET | Refills: 0 | Status: SHIPPED | OUTPATIENT
Start: 2025-02-13

## 2025-03-10 ENCOUNTER — OFFICE VISIT (OUTPATIENT)
Dept: FAMILY MEDICINE | Facility: CLINIC | Age: 60
End: 2025-03-10
Payer: COMMERCIAL

## 2025-03-10 VITALS
SYSTOLIC BLOOD PRESSURE: 112 MMHG | OXYGEN SATURATION: 100 % | HEART RATE: 70 BPM | TEMPERATURE: 98.2 F | BODY MASS INDEX: 24.56 KG/M2 | DIASTOLIC BLOOD PRESSURE: 77 MMHG | HEIGHT: 63 IN | WEIGHT: 138.6 LBS | RESPIRATION RATE: 20 BRPM

## 2025-03-10 DIAGNOSIS — R07.89 CHEST WALL PAIN: Primary | ICD-10-CM

## 2025-03-10 DIAGNOSIS — Z29.11 NEED FOR VACCINATION AGAINST RESPIRATORY SYNCYTIAL VIRUS: ICD-10-CM

## 2025-03-10 DIAGNOSIS — I10 ESSENTIAL HYPERTENSION: ICD-10-CM

## 2025-03-10 PROCEDURE — 3078F DIAST BP <80 MM HG: CPT

## 2025-03-10 PROCEDURE — 93000 ELECTROCARDIOGRAM COMPLETE: CPT | Mod: GC

## 2025-03-10 PROCEDURE — 99214 OFFICE O/P EST MOD 30 MIN: CPT | Mod: 25

## 2025-03-10 PROCEDURE — 90677 PCV20 VACCINE IM: CPT

## 2025-03-10 PROCEDURE — 3074F SYST BP LT 130 MM HG: CPT

## 2025-03-10 PROCEDURE — G0009 ADMIN PNEUMOCOCCAL VACCINE: HCPCS

## 2025-03-10 RX ORDER — BUPRENORPHINE 10 UG/H
PATCH TRANSDERMAL
COMMUNITY
Start: 2024-09-03

## 2025-03-10 RX ORDER — LIDOCAINE 4 G/G
1 PATCH TOPICAL EVERY 24 HOURS
Qty: 5 PATCH | Refills: 0 | Status: SHIPPED | OUTPATIENT
Start: 2025-03-10

## 2025-03-10 SDOH — HEALTH STABILITY: PHYSICAL HEALTH: ON AVERAGE, HOW MANY DAYS PER WEEK DO YOU ENGAGE IN MODERATE TO STRENUOUS EXERCISE (LIKE A BRISK WALK)?: 1 DAY

## 2025-03-10 ASSESSMENT — SOCIAL DETERMINANTS OF HEALTH (SDOH): HOW OFTEN DO YOU GET TOGETHER WITH FRIENDS OR RELATIVES?: TWICE A WEEK

## 2025-03-10 NOTE — PROGRESS NOTES
Preceptor Attestation:    I discussed the patient with the resident and evaluated the patient in person. I have verified the content of the note, which accurately reflects my assessment of the patient and the plan of care.  Reviewed EKG with resident.   Supervising Physician:  Sandra Penaloza MD.

## 2025-03-10 NOTE — PROGRESS NOTES
Preventive Care Visit  [facility]  Trino Rosales MD, Family Medicine  Mar 10, 2025      Assessment & Plan   Chest wall pain  Patient is here patient is here with a left side chest wall pain of 2 weeks duration.  The pain started as sharp lateral changed to tightness.  She has no shortness of breath.  No recent injury. No recent upper respiratory tract infection On physical exam the pain is not reproducible.  Differentials for this is wide including MI, pulled muscle, myocarditis, pericarditis and lung sources.  We have obtained EKG which was normal.  That makes a heart source less likely, and considered the pain to be of muscle origin.  Given patient topical pain control. Encouraged the patient to return or go to ED if her pain worsen or stayed the same.   - EKG 12-lead, tracing only  - Lidocaine (LIDOCARE) 4 % Patch; Place 1 patch over 12 hours onto the skin every 24 hours. To prevent lidocaine toxicity, patient should be patch free for 12 hrs daily.  - diclofenac (VOLTAREN) 1 % topical gel; Apply 2 g topically 4 times daily as needed for moderate pain.    Patient has been advised of split billing requirements and indicates understanding: N/A        Counseling  Appropriate preventive services were addressed with this patient via screening, questionnaire, or discussion as appropriate for fall prevention, nutrition, physical activity, Tobacco-use cessation, social engagement, weight loss and cognition.  Checklist reviewing preventive services available has been given to the patient.  Reviewed patient's diet, addressing concerns and/or questions.   She is at risk for lack of exercise and has been provided with information to increase physical activity for the benefit of her well-being.   The patient was instructed to see the dentist every 6 months.   I have reviewed Opioid Use Disorder and Substance Use Disorder risk factors and made any needed referrals.       FURTHER TESTING:    No  follow-ups on file.    Subjective   Gwendolyn is a 59 year old, presenting for the following:  Pain (Pain on left breast side bone area for 2 weeks now and feel tight), Medicare Visit, and Wellness Visit        3/10/2025    11:02 AM   Additional Questions   Roomed by cleve   Accompanied by self         3/10/2025    Information    services provided? No           HPI     Patient is here for a chest wall pain of 2 weeks duration.  She started to have a pain 2 weeks ago as a sharp pain and later changed to tightness.  No shortness of breath.  She has family history of cardiac disease both her father and mother had it.  Patient has a history of a well-controlled hypertension.  No other risk factor for cardiac disease.  No chest wall tenderness         Advance Care Planning  Patient does not have a Health Care Directive: Advance Directive received and scanned. Click on Code in the patient header to view.      3/10/2025   General Health   How would you rate your overall physical health? Good   Feel stress (tense, anxious, or unable to sleep) Not at all         3/10/2025   Nutrition   Diet: Regular (no restrictions)         3/10/2025   Exercise   Days per week of moderate/strenous exercise 1 day   (!) EXERCISE CONCERN      3/10/2025   Social Factors   Frequency of gathering with friends or relatives Twice a week   Worry food won't last until get money to buy more No   Food not last or not have enough money for food? No   Do you have housing? (Housing is defined as stable permanent housing and does not include staying ouside in a car, in a tent, in an abandoned building, in an overnight shelter, or couch-surfing.) No   Are you worried about losing your housing? No   Lack of transportation? No   Unable to get utilities (heat,electricity)? No   Want help with housing or utility concern? No   (!) HOUSING CONCERN PRESENT      3/10/2025   Fall Risk   Fallen 2 or more times in the past year? No   Trouble with walking  or balance? Yes   Gait Speed Test (Document in seconds) 5.2   Gait Speed Test Interpretation Greater than 5.01 seconds - ABNORMAL          3/10/2025   Activities of Daily Living- Home Safety   Needs help with the following daily activites None of the above   Safety concerns in the home None of the above         3/10/2025   Dental   Dentist two times every year? (!) NO         3/10/2025   Hearing Screening   Hearing concerns? None of the above         3/10/2025   Driving Risk Screening   Patient/family members have concerns about driving No         3/10/2025   General Alertness/Fatigue Screening   Have you been more tired than usual lately? No         3/10/2025   Urinary Incontinence Screening   Bothered by leaking urine in past 6 months No           2024   TB Screening   Were you born outside of the [place]? No           Today's PHQ-2 Score:       3/10/2025    11:05 AM   PHQ-2 (  [employer])   Q1: Little interest or pleasure in doing things 0   Q2: Feeling down, depressed or hopeless 0   PHQ-2 Score 0           3/10/2025   Substance Use   Alcohol more than 3/day or more than 7/wk No   Do you have a current opioid prescription? (!) YES   How severe/bad is pain from 1 to 10? 7/10   Do you use any other substances recreationally? No          No data to display              Low Risk (0-3)  Moderate Risk (4-7)  High Risk (>8)  Social History     Tobacco Use    Smoking status: Former     Current packs/day: 0.00     Types: Cigarettes     Quit date: 2/15/1985     Years since quittin.0    Smokeless tobacco: Never   Vaping Use    Vaping status: Never Used   Substance Use Topics    Alcohol use: No    Drug use: No                History of abnormal Pap smear:         Latest Ref Rng & Units 2022    11:32 AM   PAP / HPV   PAP  Negative for Intraepithelial Lesion or Malignancy (NILM)    HPV 16 DNA Negative Negative    HPV 18 DNA Negative Negative    Other HR HPV Negative Negative      ASCVD Risk   The 10-year  "ASCVD risk score (Kristin NETTLES, et al., 2019) is: 4.3%    Values used to calculate the score:      Age: 59 years      Sex: Female      Is Non- : No      Diabetic: No      Tobacco smoker: No      Systolic Blood Pressure: 112 mmHg      Is BP treated: Yes      HDL Cholesterol: 48 mg/dL      Total Cholesterol: 211 mg/dL            Reviewed and updated as needed this visit by Provider                      Current providers sharing in care for this patient include:  Patient Care Team:  Maggie Arango MD as PCP - General (Family Medicine)  Fran Cooley MD as MD (Specialist)  Beka Khalil MD as MD (Physical Medicine & Rehabilitation - Pain Medicine)  Fran Panda MD as MD (Orthopedics)  Mike Coy MD as MD (Gastroenterology)  Maggie Arango MD as Assigned PCP    The following health maintenance items are reviewed in Epic and correct as of today:  Health Maintenance   Topic Date Due    ZOSTER IMMUNIZATION (1 of 2) Never done    URINE DRUG SCREEN  02/02/2018    MAMMO SCREENING  02/16/2020    RSV VACCINE (1 - Risk 60-74 years 1-dose series) Never done    MEDICARE ANNUAL WELLNESS VISIT  08/16/2023    COVID-19 Vaccine (1 - 2024-25 season) Never done    BMP  11/15/2024    TSH W/FREE T4 REFLEX  09/26/2025    GLUCOSE  11/15/2026    HPV TEST  08/16/2027    LIPID  08/16/2027    PAP  08/16/2027    ADVANCE CARE PLANNING  08/19/2027    DTAP/TDAP/TD IMMUNIZATION (3 - Td or Tdap) 01/15/2030    COLORECTAL CANCER SCREENING  11/01/2034    HEPATITIS C SCREENING  Completed    HIV SCREENING  Completed    PHQ-2 (once per calendar year)  Completed    INFLUENZA VACCINE  Completed    Pneumococcal Vaccine: 50+ Years  Completed    HPV IMMUNIZATION  Aged Out    MENINGITIS IMMUNIZATION  Aged Out            Objective    Exam  /77   Pulse 70   Temp 98.2  F (36.8  C) (Tympanic)   Resp 20   Ht 1.6 m (5' 3\")   Wt 62.9 kg (138 lb 9.6 oz)   SpO2 100%   BMI 24.55 kg/m     Estimated body " "mass index is 24.55 kg/m  as calculated from the following:    Height as of this encounter: 1.6 m (5' 3\").    Weight as of this encounter: 62.9 kg (138 lb 9.6 oz).    Physical Exam  GENERAL: alert and no distress  NECK: no adenopathy, no asymmetry, masses, or scars  RESP: lungs clear to auscultation - no rales, rhonchi or wheezes  CV: regular rate and rhythm, normal S1 S2, no S3 or S4, no murmur, click or rub, no peripheral edema, no chest wall tenderness  ABDOMEN: soft, nontender, no hepatosplenomegaly, no masses and bowel sounds normal  MS: no gross musculoskeletal defects noted, no edema        3/10/2025   Mini Cog   Clock Draw Score 2 Normal   3 Item Recall 3 objects recalled   Mini Cog Total Score 5              Signed Electronically by: Trino Rosales MD    "

## 2025-03-10 NOTE — PROGRESS NOTES
Assessment & Plan   Chest wall pain  Patient is here patient is here with a left side chest wall pain of 2 weeks duration.  The pain started as sharp lateral changed to tightness.  She has no shortness of breath.  No recent injury. No recent upper respiratory tract infection On physical exam the pain is not reproducible.  Differentials for this is wide including MI, pulled muscle, myocarditis, pericarditis and lung sources.  We have obtained EKG which was normal.  That makes a heart source less likely, and considered the pain to be of muscle origin.  Given patient topical pain control. Encouraged the patient to return or go to ED if her pain worsen or stayed the same.  - EKG 12-lead, tracing only              Counseling  Appropriate preventive services were addressed with this patient via screening, questionnaire, or discussion as appropriate for fall prevention, nutrition, physical activity, Tobacco-use cessation, social engagement, weight loss and cognition.  Checklist reviewing preventive services available has been given to the patient.  Reviewed patient's diet, addressing concerns and/or questions.   She is at risk for lack of exercise and has been provided with information to increase physical activity for the benefit of her well-being.   The patient was instructed to see the dentist every 6 months.   I have reviewed Opioid Use Disorder and Substance Use Disorder risk factors and made any needed referrals.       There are no Patient Instructions on file for this visit.    No follow-ups on file.    Subjective   Gwendolyn is a 59 year old, presenting for the following health issues:  Pain (Pain on left breast side bone area for 2 weeks now and feel tight)      3/10/2025    11:02 AM   Additional Questions   Roomed by msy   Accompanied by self         3/10/2025    Information    services provided? No     HPI      Patient is here for a chest wall pain of 2 weeks duration.  She started to have a  "pain 2 weeks ago as a sharp pain and later changed to tightness.  No shortness of breath.  She has family history of cardiac disease both her father and mother had it.  Patient has a history of a well-controlled hypertension.  No other risk factor for cardiac disease.  No chest wall tenderness          Objective    /77   Pulse 70   Temp 98.2  F (36.8  C) (Tympanic)   Resp 20   Ht 1.6 m (5' 3\")   Wt 62.9 kg (138 lb 9.6 oz)   SpO2 100%   BMI 24.55 kg/m    Body mass index is 24.55 kg/m .  Physical Exam   GENERAL: alert and no distress  NECK: no adenopathy, no asymmetry, masses, or scars  RESP: lungs clear to auscultation - no rales, rhonchi or wheezes  CV: regular rate and rhythm, normal S1 S2, no S3 or S4, no murmur, click or rub, no peripheral edema, no chest wall tenderness  ABDOMEN: soft, nontender, no hepatosplenomegaly, no masses and bowel sounds normal  MS: no gross musculoskeletal defects noted, no edema    No results found for any visits on 03/10/25.        Signed Electronically by: Trino Rosales MD  {Email feedback regarding this note to [email or web address]   :[id number]}  "

## 2025-03-13 ENCOUNTER — TRANSFERRED RECORDS (OUTPATIENT)
Dept: HEALTH INFORMATION MANAGEMENT | Facility: CLINIC | Age: 60
End: 2025-03-13
Payer: COMMERCIAL

## 2025-03-30 DIAGNOSIS — K21.9 GASTROESOPHAGEAL REFLUX DISEASE, UNSPECIFIED WHETHER ESOPHAGITIS PRESENT: ICD-10-CM

## 2025-03-31 RX ORDER — ONDANSETRON 4 MG/1
TABLET, ORALLY DISINTEGRATING ORAL
Qty: 30 TABLET | Refills: 0 | Status: SHIPPED | OUTPATIENT
Start: 2025-03-31

## 2025-03-31 NOTE — TELEPHONE ENCOUNTER
Name from pharmacy: Ondansetron Oral Tablet Disintegrating 4 MG         Will file in chart as: ondansetron (ZOFRAN ODT) 4 MG ODT tab    Sig: DISSOLVE 1 TABLET (4 MG) UNDER THE TONGUE EVERY 8 HOURS AS NEEDED FOR NAUSEA.    Disp: 30 tablet    Refills: 0    Start: 3/30/2025    Class: E-Prescribe    Non-formulary For: Gastroesophageal reflux disease, unspecified whether esophagitis present    Last ordered: 1 month ago (2/13/2025) by Maggie Arango MD    Last refill: 2/13/2025    Rx #: [id number]     Antivertigo/Antiemetic Agents Finwzj1403/30/2025 01:44 PM   Protocol Details Medication is active on med list and the sig matches. RN to manually verify dose and sig if red X/fail.    Medication indicated for associated diagnosis    Recent (12 mo) or future (90 days) visit with authorizing provider's specialty    Patient is 18 years of age or older          Prescription approved per [facility] Refill Protocol.    Marvin Grace RN, MSN

## 2025-04-14 ENCOUNTER — OFFICE VISIT (OUTPATIENT)
Dept: FAMILY MEDICINE | Facility: CLINIC | Age: 60
End: 2025-04-14
Payer: COMMERCIAL

## 2025-04-14 VITALS
SYSTOLIC BLOOD PRESSURE: 101 MMHG | RESPIRATION RATE: 16 BRPM | HEIGHT: 63 IN | HEART RATE: 66 BPM | OXYGEN SATURATION: 100 % | DIASTOLIC BLOOD PRESSURE: 67 MMHG | TEMPERATURE: 98.1 F | WEIGHT: 143 LBS | BODY MASS INDEX: 25.34 KG/M2

## 2025-04-14 DIAGNOSIS — N63.23 MASS OF LOWER OUTER QUADRANT OF LEFT BREAST: Primary | ICD-10-CM

## 2025-04-14 DIAGNOSIS — E03.9 HYPOTHYROIDISM, UNSPECIFIED TYPE: ICD-10-CM

## 2025-04-14 LAB
ALBUMIN SERPL BCG-MCNC: 3.8 G/DL (ref 3.5–5.2)
ALP SERPL-CCNC: 89 U/L (ref 40–150)
ALT SERPL W P-5'-P-CCNC: 20 U/L (ref 0–50)
ANION GAP SERPL CALCULATED.3IONS-SCNC: 10 MMOL/L (ref 7–15)
AST SERPL W P-5'-P-CCNC: 35 U/L (ref 0–45)
BILIRUB SERPL-MCNC: 0.2 MG/DL
BUN SERPL-MCNC: 9.8 MG/DL (ref 8–23)
CALCIUM SERPL-MCNC: 9.3 MG/DL (ref 8.8–10.4)
CHLORIDE SERPL-SCNC: 104 MMOL/L (ref 98–107)
CREAT SERPL-MCNC: 1.01 MG/DL (ref 0.51–0.95)
EGFRCR SERPLBLD CKD-EPI 2021: 63 ML/MIN/1.73M2
GLUCOSE SERPL-MCNC: 82 MG/DL (ref 70–99)
HCO3 SERPL-SCNC: 28 MMOL/L (ref 22–29)
POTASSIUM SERPL-SCNC: 4.4 MMOL/L (ref 3.4–5.3)
PROT SERPL-MCNC: 6.1 G/DL (ref 6.4–8.3)
SODIUM SERPL-SCNC: 142 MMOL/L (ref 135–145)
TSH SERPL DL<=0.005 MIU/L-ACNC: 0.61 UIU/ML (ref 0.3–4.2)

## 2025-04-14 NOTE — PROGRESS NOTES
Assessment & Plan     Mass of lower outer quadrant of left breast  First noticed on 4/12/25, singular hard, mobile <1 cm at 6 o'clock position without skin or nipple changes, no drainage. Some pain at baseline, worse in the AM. Last mammogram 3-4 years ago.   - MA Diagnostic Bilateral w/ Willian  - US Breast Left Limited 1-3 Quadrants    Hypothyroidism, unspecified type  Chronic, stable, TSH within normal limits in Sept 2024. Routine monitoring of TSH levels.   - Comprehensive metabolic panel  - TSH with free T4 reflex    Return if symptoms worsen or fail to improve.     Subjective   Gwendoyln is a 59 year old, presenting for the following health issues:  Breast Pain (Left breast lump)        4/14/2025    11:32 AM   Additional Questions   Roomed by shoua   Accompanied by self         4/14/2025    Information    services provided? No     HPI      Noticed L breast lump around the 6 o'clock position which was tender and mobile, first noticed on Saturday 4/12/25. Has not changed in character over time. Pain is worse in the morning and not relieved by ASA, opioids (prescribed for chronic pain), nor Tylenol. Has also tried lidocaine patches which did not relieve the pain. She denies night sweats, unintentional weight loss, increased fatigue, night sweats, nipple drainage or skin changes over the area of the lump. She is post-menopausal following hysterectomy and not on any estrogens/progesterones.     She has hypothyroidism on levothyroxine and feels this is well controlled. TSH in Sept 2024 was normal, has not been checked since. Has diarrhea and constipation at baseline as well as abdominal pain. States this is unchanged recently.       Review of Systems  Constitutional, neuro, ENT, endocrine, pulmonary, cardiac, gastrointestinal, genitourinary, musculoskeletal, integument and psychiatric systems are negative, except as otherwise noted.      Objective    /67 (BP Location: Left arm, Patient Position:  "Sitting, Cuff Size: Adult Regular)   Pulse 66   Temp 98.1  F (36.7  C) (Oral)   Resp 16   Ht 1.6 m (5' 3\")   Wt 64.9 kg (143 lb)   SpO2 100%   BMI 25.33 kg/m    Body mass index is 25.33 kg/m .    Physical Exam   GENERAL: alert and no distress  NECK: no adenopathy, no asymmetry, masses, or scars  RESP: lungs clear to auscultation - no rales, rhonchi or wheezes  BREAST: right side normal without masses, left side <1 cm hard, mobile lump at the 6 o'clock position, no tenderness or nipple discharge and no palpable axillary masses or adenopathy  CV: regular rate and rhythm, normal S1 S2, no S3 or S4, no murmur, click or rub, no peripheral edema  ABDOMEN: soft, nontender, no hepatosplenomegaly, no masses and bowel sounds normal  MS: no gross musculoskeletal defects noted, no edema      Reviewed Mammogram and Breast US from 2/16/2018:   Soft, almond-shaped lump in the upper-outer quadrant of the left breast at 1:00 corresponds to normal subcutaneous fat.     IMPRESSION:  1.  No evidence of malignancy.   2.  Findings reviewed with the patient.     ACR BI-RADS Category 1: Negative.         Signed Electronically by: Niru Giron MD  "

## 2025-04-14 NOTE — PROGRESS NOTES
Preceptor Attestation:    I discussed the patient with the resident and evaluated the patient in person. I have verified the content of the note, which accurately reflects my assessment of the patient and the plan of care.   Supervising Physician:  Kayla Hopkins MD

## 2025-04-16 ENCOUNTER — TRANSFERRED RECORDS (OUTPATIENT)
Dept: HEALTH INFORMATION MANAGEMENT | Facility: CLINIC | Age: 60
End: 2025-04-16
Payer: COMMERCIAL

## 2025-04-17 ENCOUNTER — HOSPITAL ENCOUNTER (OUTPATIENT)
Dept: MAMMOGRAPHY | Facility: CLINIC | Age: 60
Discharge: HOME | End: 2025-04-17
Attending: STUDENT IN AN ORGANIZED HEALTH CARE EDUCATION/TRAINING PROGRAM
Payer: COMMERCIAL

## 2025-04-17 DIAGNOSIS — N63.23 MASS OF LOWER OUTER QUADRANT OF LEFT BREAST: ICD-10-CM

## 2025-04-17 PROCEDURE — 76642 ULTRASOUND BREAST LIMITED: CPT | Mod: LT

## 2025-04-17 PROCEDURE — 77062 BREAST TOMOSYNTHESIS BI: CPT

## 2025-05-07 ENCOUNTER — TRANSFERRED RECORDS (OUTPATIENT)
Dept: HEALTH INFORMATION MANAGEMENT | Facility: CLINIC | Age: 60
End: 2025-05-07
Payer: COMMERCIAL

## 2025-05-08 DIAGNOSIS — K21.9 GASTROESOPHAGEAL REFLUX DISEASE, UNSPECIFIED WHETHER ESOPHAGITIS PRESENT: ICD-10-CM

## 2025-05-08 RX ORDER — ONDANSETRON 4 MG/1
TABLET, ORALLY DISINTEGRATING ORAL
Qty: 30 TABLET | Refills: 0 | Status: SHIPPED | OUTPATIENT
Start: 2025-05-08

## 2025-05-08 NOTE — TELEPHONE ENCOUNTER
Name from pharmacy: Ondansetron Oral Tablet Disintegrating 4 MG          Will file in chart as: ondansetron (ZOFRAN ODT) 4 MG ODT tab    Sig: DISSOLVE 1 TABLET (4 MG) UNDER THE TONGUE EVERY 8 HOURS AS NEEDED FOR NAUSEA.    Disp: 30 tablet    Refills: 0    Start: 5/8/2025    Class: E-Prescribe    Non-formulary For: Gastroesophageal reflux disease, unspecified whether esophagitis present    Last ordered: 1 month ago (3/31/2025) by Maggie Arango MD    Last refill: 3/31/2025    Rx #: [id number]     Antivertigo/Antiemetic Agents Oilzpb0005/08/2025 07:20 AM   Protocol Details Medication is active on med list and the sig matches. RN to manually verify dose and sig if red X/fail.    Medication indicated for associated diagnosis    Recent (12 mo) or future (90 days) visit with authorizing provider's specialty    Patient is 18 years of age or older        Prescription approved per [facility] Refill Protocol.      Marvin Grace RN, MSN

## 2025-06-02 DIAGNOSIS — K21.9 GASTROESOPHAGEAL REFLUX DISEASE, UNSPECIFIED WHETHER ESOPHAGITIS PRESENT: ICD-10-CM

## 2025-06-02 RX ORDER — ONDANSETRON 4 MG/1
TABLET, ORALLY DISINTEGRATING ORAL
Qty: 30 TABLET | Refills: 0 | Status: SHIPPED | OUTPATIENT
Start: 2025-06-02

## 2025-06-02 NOTE — TELEPHONE ENCOUNTER
Name from pharmacy: Ondansetron Oral Tablet Disintegrating 4 MG          Will file in chart as: ondansetron (ZOFRAN ODT) 4 MG ODT tab    Sig: DISSOLVE 1 TABLET (4 MG) UNDER THE TONGUE EVERY 8 HOURS AS NEEDED FOR NAUSEA.    Disp: 30 tablet    Refills: 0    Start: 6/2/2025    Class: E-Prescribe    Non-formulary For: Gastroesophageal reflux disease, unspecified whether esophagitis present    Last ordered: 3 weeks ago (5/8/2025) by Maggie Arango MD    Last refill: 5/8/2025    Rx #: [id number]     Antivertigo/Antiemetic Agents Hhdhyi4406/02/2025 03:00 PM   Protocol Details Medication is active on med list and the sig matches. RN to manually verify dose and sig if red X/fail.    Medication indicated for associated diagnosis    Recent (12 month) or future (90 days) visit with authorizing provider's specialty (provided they have been seen in the past 15 months)    Patient is 18 years of age or older        Prescription approved per [facility] Refill Protocol.  MARGE Pierce, BSN

## 2025-06-04 ENCOUNTER — TRANSFERRED RECORDS (OUTPATIENT)
Dept: HEALTH INFORMATION MANAGEMENT | Facility: CLINIC | Age: 60
End: 2025-06-04
Payer: COMMERCIAL

## 2025-07-01 DIAGNOSIS — K21.9 GASTROESOPHAGEAL REFLUX DISEASE, UNSPECIFIED WHETHER ESOPHAGITIS PRESENT: ICD-10-CM

## 2025-07-01 RX ORDER — ONDANSETRON 4 MG/1
TABLET, ORALLY DISINTEGRATING ORAL
Qty: 30 TABLET | Refills: 0 | Status: SHIPPED | OUTPATIENT
Start: 2025-07-01

## 2025-07-01 NOTE — TELEPHONE ENCOUNTER
Name from pharmacy: Ondansetron Oral Tablet Disintegrating 4 MG          Will file in chart as: ondansetron (ZOFRAN ODT) 4 MG ODT tab    Sig: DISSOLVE 1 TABLET (4 MG) UNDER THE TONGUE EVERY 8 HOURS AS NEEDED FOR NAUSEA.    Disp: 30 tablet    Refills: 0    Start: 7/1/2025    Class: E-Prescribe    Non-formulary For: Gastroesophageal reflux disease, unspecified whether esophagitis present    Last ordered: 4 weeks ago (6/2/2025) by Maggie Arango MD    Last refill: 6/2/2025    Rx #: [id number]     Antivertigo/Antiemetic Agents Aurfpq5507/01/2025 02:23 PM   Protocol Details Medication is active on med list and the sig matches. RN to manually verify dose and sig if red X/fail.    Medication indicated for associated diagnosis    Recent (12 month) or future (90 days) visit with authorizing provider's specialty (provided they have been seen in the past 15 months)    Patient is 18 years of age or older        Prescription approved per [facility] Refill Protocol.  MARGE Pierce, BSN

## 2025-07-16 DIAGNOSIS — K21.9 GASTROESOPHAGEAL REFLUX DISEASE, UNSPECIFIED WHETHER ESOPHAGITIS PRESENT: ICD-10-CM

## 2025-07-16 RX ORDER — OMEPRAZOLE 40 MG/1
40 CAPSULE, DELAYED RELEASE ORAL DAILY
Qty: 90 CAPSULE | Refills: 0 | Status: SHIPPED | OUTPATIENT
Start: 2025-07-16

## 2025-07-16 NOTE — TELEPHONE ENCOUNTER
omeprazole (PRILOSEC) 40 MG DR capsule         Sig: Take 1 capsule (40 mg) by mouth daily.    Disp: 90 capsule    Refills: 0    Start: 7/16/2025    Class: E-Prescribe    For: Gastroesophageal reflux disease, unspecified whether esophagitis present    Last ordered: 1 year ago (8/2/2023) by Ronnie Jung MD    PPI Protocol Rcyyop8207/16/2025 03:41 PM   Protocol Details Medication is active on med list and the sig matches. RN to manually verify dose and sig if red X/fail.    Medication indicated for associated diagnosis    Recent (12 month) or future (90 days) visit with authorizing provider's specialty (provided they have been seen in the past 15 months)    Patient is age 18 or older    No active pregnacy on record    No positive pregnancy test in past 12 months        Prescription approved per [facility] Refill Protocol.  MARGE Pierce, BSN     10/15/2018  6:20 AM - Stephen Madsen Incoming Lab Results From [facility]     Component Results     Component Value Ref Range & Units Status Collected Lab   pH, Arterial 7.450  7.350 - 7.450 Final 10/15/2018  6:19 AM [facility] Lab   pCO2, Arterial 34.0   35.0 - 45.0 mmHg Final 10/15/2018  6:19 AM [facility] Lab   pO2, Arterial 62.0   80.0 - 100.0 mmHg Final 10/15/2018  6:19 AM [facility] Lab   HCO3, Arterial 23.6  22.0 - 26.0 mmol/L Final 10/15/2018  6:19 AM [facility] Excess, Arterial 0.1  -2.0 - 2.0 mmol/L Final 10/15/2018  6:19 AM [facility]   Hemoglobin, Art, Extended 13.9   14.0 - 18.0 g/dL Final 10/15/2018  6:19 AM [facility]   O2 Sat, Arterial 89.0   >92 % Final 10/15/2018  6:19 AM [facility]   Carboxyhgb, Arterial 3.3  0.0 - 5.0 % Final 10/15/2018  6:19 AM [facility]        0.0-1.5   (Smokers 1.5-5.0)    Methemoglobin, Arterial 0.8  <1.5 % Final 10/15/2018  6:19 AM [facility]   O2 Content, Arterial 17.4  Not Established mL/dL Final 10/15/2018  6:19 AM [facility]   O2 Therapy Unknown   Final 10/15/2018  6:19 AM [facility]   Testing Performed By     tSacy Currie Name Director Address Valid Date Range   [id number] - [address] LAB Allyssa Go  [place],[address]  [address] [facility] [place] [address] 08/30/17 1233-Present   Lab and Collection     Blood Gas, Arterial on 10/15/2018   Result History     Blood Gas, Arterial on 10/15/2018     Room air  Site LR  AT +  RR 26

## 2025-07-19 DIAGNOSIS — K21.9 GASTROESOPHAGEAL REFLUX DISEASE, UNSPECIFIED WHETHER ESOPHAGITIS PRESENT: ICD-10-CM

## 2025-07-21 RX ORDER — ONDANSETRON 4 MG/1
TABLET, ORALLY DISINTEGRATING ORAL
Qty: 30 TABLET | Refills: 0 | Status: SHIPPED | OUTPATIENT
Start: 2025-07-21

## 2025-07-21 NOTE — TELEPHONE ENCOUNTER
Name from pharmacy: Ondansetron Oral Tablet Disintegrating 4 MG          Will file in chart as: ondansetron (ZOFRAN ODT) 4 MG ODT tab    Sig: DISSOLVE 1 TABLET (4 MG) UNDER THE TONGUE EVERY 8 HOURS AS NEEDED FOR NAUSEA.    Disp: 30 tablet    Refills: 0    Start: 7/19/2025    Class: E-Prescribe    Non-formulary For: Gastroesophageal reflux disease, unspecified whether esophagitis present    Last ordered: 2 weeks ago (7/1/2025) by Maggie Arango MD    Last refill: 7/1/2025    Rx #: [id number]     Antivertigo/Antiemetic Agents Qyivlz9007/19/2025 09:18 AM   Protocol Details Medication is active on med list and the sig matches. RN to manually verify dose and sig if red X/fail.    Medication indicated for associated diagnosis    Recent (12 month) or future (90 days) visit with authorizing provider's specialty (provided they have been seen in the past 15 months)    Patient is 18 years of age or older        Prescription approved per [facility] Refill Protocol.  MARGE Pierce, BSN

## 2025-08-06 DIAGNOSIS — K21.9 GASTROESOPHAGEAL REFLUX DISEASE, UNSPECIFIED WHETHER ESOPHAGITIS PRESENT: ICD-10-CM

## 2025-08-06 RX ORDER — ONDANSETRON 4 MG/1
TABLET, ORALLY DISINTEGRATING ORAL
Qty: 30 TABLET | Refills: 0 | Status: SHIPPED | OUTPATIENT
Start: 2025-08-06

## 2025-08-22 ENCOUNTER — OFFICE VISIT (OUTPATIENT)
Dept: FAMILY MEDICINE | Facility: CLINIC | Age: 60
End: 2025-08-22
Payer: COMMERCIAL

## 2025-08-22 VITALS
OXYGEN SATURATION: 98 % | DIASTOLIC BLOOD PRESSURE: 71 MMHG | WEIGHT: 136.4 LBS | RESPIRATION RATE: 20 BRPM | BODY MASS INDEX: 24.16 KG/M2 | HEART RATE: 69 BPM | TEMPERATURE: 98.3 F | SYSTOLIC BLOOD PRESSURE: 107 MMHG

## 2025-08-22 DIAGNOSIS — R07.89 CHEST WALL PAIN: Primary | ICD-10-CM

## 2025-08-22 DIAGNOSIS — K21.00 GASTROESOPHAGEAL REFLUX DISEASE WITH ESOPHAGITIS, UNSPECIFIED WHETHER HEMORRHAGE: ICD-10-CM

## 2025-08-22 DIAGNOSIS — K44.9 HIATAL HERNIA: ICD-10-CM

## 2025-09-04 ENCOUNTER — OFFICE VISIT (OUTPATIENT)
Dept: PEDIATRICS | Facility: CLINIC | Age: 60
End: 2025-09-04
Payer: COMMERCIAL

## 2025-09-04 VITALS
TEMPERATURE: 98.4 F | RESPIRATION RATE: 14 BRPM | BODY MASS INDEX: 24.34 KG/M2 | OXYGEN SATURATION: 99 % | HEART RATE: 70 BPM | WEIGHT: 137.4 LBS | HEIGHT: 63 IN | DIASTOLIC BLOOD PRESSURE: 69 MMHG | SYSTOLIC BLOOD PRESSURE: 109 MMHG

## 2025-09-04 DIAGNOSIS — R55 VASOVAGAL SYNCOPE: Primary | ICD-10-CM

## 2025-09-04 DIAGNOSIS — E03.9 HYPOTHYROIDISM, UNSPECIFIED TYPE: ICD-10-CM

## 2025-09-04 DIAGNOSIS — Z23 NEED FOR VACCINATION: ICD-10-CM

## 2025-09-04 DIAGNOSIS — K21.9 GASTROESOPHAGEAL REFLUX DISEASE, UNSPECIFIED WHETHER ESOPHAGITIS PRESENT: ICD-10-CM

## 2025-09-04 DIAGNOSIS — D64.9 NORMOCYTIC ANEMIA: ICD-10-CM

## 2025-09-04 PROBLEM — M25.572 PAIN IN JOINT INVOLVING ANKLE AND FOOT, LEFT: Status: RESOLVED | Noted: 2023-11-15 | Resolved: 2025-09-04

## 2025-09-04 PROBLEM — S72.001A HIP FRACTURE, RIGHT, CLOSED, INITIAL ENCOUNTER (H): Status: ACTIVE | Noted: 2018-10-13

## 2025-09-04 PROBLEM — S73.004A HIP DISLOCATION, RIGHT (H): Status: ACTIVE | Noted: 2018-06-06

## 2025-09-04 PROBLEM — R52 ACUTE PAIN: Status: ACTIVE | Noted: 2025-09-04

## 2025-09-04 LAB
BASOPHILS # BLD AUTO: <0.04 10E3/UL (ref 0–0.2)
BASOPHILS NFR BLD AUTO: 0.6 %
EOSINOPHIL # BLD AUTO: 0.11 10E3/UL (ref 0–0.7)
EOSINOPHIL NFR BLD AUTO: 2.3 %
ERYTHROCYTE [DISTWIDTH] IN BLOOD BY AUTOMATED COUNT: 11.7 % (ref 10–15)
HCT VFR BLD AUTO: 32.9 % (ref 35–47)
HGB BLD-MCNC: 10.9 G/DL (ref 11.7–15.7)
IMM GRANULOCYTES # BLD: <0.04 10E3/UL
IMM GRANULOCYTES NFR BLD: 0 %
LYMPHOCYTES # BLD AUTO: 1.74 10E3/UL (ref 0.8–5.3)
LYMPHOCYTES NFR BLD AUTO: 36.4 %
MCH RBC QN AUTO: 31.1 PG (ref 26.5–33)
MCHC RBC AUTO-ENTMCNC: 33.1 G/DL (ref 31.5–36.5)
MCV RBC AUTO: 94 FL (ref 78–100)
MONOCYTES # BLD AUTO: 0.34 10E3/UL (ref 0–1.3)
MONOCYTES NFR BLD AUTO: 7.1 %
NEUTROPHILS # BLD AUTO: 2.56 10E3/UL (ref 1.6–8.3)
NEUTROPHILS NFR BLD AUTO: 53.6 %
PLATELET # BLD AUTO: 293 10E3/UL (ref 150–450)
RBC # BLD AUTO: 3.5 10E6/UL (ref 3.8–5.2)
WBC # BLD AUTO: 4.78 10E3/UL (ref 4–11)

## 2025-09-04 RX ORDER — ONDANSETRON 4 MG/1
4 TABLET, ORALLY DISINTEGRATING ORAL EVERY 8 HOURS PRN
Qty: 30 TABLET | Refills: 0 | Status: SHIPPED | OUTPATIENT
Start: 2025-09-04

## 2025-09-04 ASSESSMENT — PAIN SCALES - GENERAL: PAINLEVEL_OUTOF10: MODERATE PAIN (4)

## (undated) RX ORDER — BETAMETHASONE SODIUM PHOSPHATE AND BETAMETHASONE ACETATE 3; 3 MG/ML; MG/ML
INJECTION, SUSPENSION INTRA-ARTICULAR; INTRALESIONAL; INTRAMUSCULAR; SOFT TISSUE
Status: DISPENSED
Start: 2017-10-02

## (undated) RX ORDER — LIDOCAINE HYDROCHLORIDE 10 MG/ML
INJECTION, SOLUTION INFILTRATION; PERINEURAL
Status: DISPENSED
Start: 2017-10-02